# Patient Record
Sex: MALE | Race: WHITE | NOT HISPANIC OR LATINO | Employment: STUDENT | URBAN - METROPOLITAN AREA
[De-identification: names, ages, dates, MRNs, and addresses within clinical notes are randomized per-mention and may not be internally consistent; named-entity substitution may affect disease eponyms.]

---

## 2017-03-03 ENCOUNTER — OFFICE VISIT (OUTPATIENT)
Dept: INTERNAL MEDICINE | Facility: CLINIC | Age: 27
End: 2017-03-03
Payer: COMMERCIAL

## 2017-03-03 VITALS
SYSTOLIC BLOOD PRESSURE: 110 MMHG | BODY MASS INDEX: 25.4 KG/M2 | HEART RATE: 80 BPM | TEMPERATURE: 98 F | DIASTOLIC BLOOD PRESSURE: 80 MMHG | HEIGHT: 67 IN | WEIGHT: 161.81 LBS

## 2017-03-03 DIAGNOSIS — R59.0 ANTERIOR CERVICAL LYMPHADENOPATHY: ICD-10-CM

## 2017-03-03 DIAGNOSIS — J02.9 PHARYNGITIS, UNSPECIFIED ETIOLOGY: Primary | ICD-10-CM

## 2017-03-03 PROCEDURE — 99204 OFFICE O/P NEW MOD 45 MIN: CPT | Mod: S$GLB,,, | Performed by: INTERNAL MEDICINE

## 2017-03-03 PROCEDURE — 99999 PR PBB SHADOW E&M-EST. PATIENT-LVL III: CPT | Mod: PBBFAC,,, | Performed by: INTERNAL MEDICINE

## 2017-03-03 RX ORDER — AMOXICILLIN 875 MG/1
875 TABLET, FILM COATED ORAL 2 TIMES DAILY
Qty: 14 TABLET | Refills: 0 | Status: SHIPPED | OUTPATIENT
Start: 2017-03-03 | End: 2017-03-10

## 2017-03-03 NOTE — PROGRESS NOTES
Subjective:       Patient ID: Donal Serrano is a 27 y.o. male.    Chief Complaint: Sore Throat and Lymphadenopathy    HPI   Pt here for evaluation of 2 days of persistent sore throat associated with anterior neck tenderness and slight post nasal drip. No fevers/chills or body aches. Some improvement with NSAIDs.   Review of Systems   Constitutional: Negative for activity change, appetite change, chills, diaphoresis, fatigue, fever and unexpected weight change.   HENT: Positive for sore throat. Negative for postnasal drip, rhinorrhea, sinus pressure, sneezing, trouble swallowing and voice change.    Respiratory: Negative for cough, shortness of breath and wheezing.    Cardiovascular: Negative for chest pain, palpitations and leg swelling.   Gastrointestinal: Negative for abdominal pain, blood in stool, constipation, diarrhea, nausea and vomiting.   Genitourinary: Negative for dysuria.   Musculoskeletal: Negative for arthralgias and myalgias.   Skin: Negative for rash and wound.   Allergic/Immunologic: Negative for environmental allergies and food allergies.   Hematological: Negative for adenopathy. Does not bruise/bleed easily.       Objective:      Physical Exam   Constitutional: He is oriented to person, place, and time. He appears well-developed and well-nourished. No distress.   HENT:   Head: Normocephalic and atraumatic.   Right Ear: External ear normal.   Left Ear: External ear normal.   Nose: Nose normal.   Mouth/Throat: Posterior oropharyngeal edema and posterior oropharyngeal erythema present. No oropharyngeal exudate or tonsillar abscesses.   Eyes: Conjunctivae and EOM are normal. Pupils are equal, round, and reactive to light. Right eye exhibits no discharge. Left eye exhibits no discharge. No scleral icterus.   Neck: Normal range of motion. Neck supple. No JVD present.   Cardiovascular: Normal rate, regular rhythm and normal heart sounds.    No murmur heard.  Pulmonary/Chest: Effort normal and  breath sounds normal. No respiratory distress. He has no wheezes. He has no rales.   Musculoskeletal: He exhibits no edema.   Lymphadenopathy:     He has cervical adenopathy.   Neurological: He is alert and oriented to person, place, and time.   Skin: Skin is warm and dry. No rash noted. He is not diaphoretic. No pallor.       Assessment:       1. Pharyngitis, unspecified etiology    2. Anterior cervical lymphadenopathy        Plan:    1. Rx Amoxicillin BID x 7 days, continue NSAIDs PRN fever/pain       May use Cepacol lozenges PRN pain

## 2017-08-29 ENCOUNTER — OFFICE VISIT (OUTPATIENT)
Dept: DERMATOLOGY | Facility: CLINIC | Age: 27
End: 2017-08-29
Payer: COMMERCIAL

## 2017-08-29 DIAGNOSIS — D22.9 MULTIPLE BENIGN NEVI: ICD-10-CM

## 2017-08-29 DIAGNOSIS — Z87.898 HISTORY OF ATYPICAL NEVUS: ICD-10-CM

## 2017-08-29 DIAGNOSIS — D48.9 NEOPLASM OF UNCERTAIN BEHAVIOR: Primary | ICD-10-CM

## 2017-08-29 DIAGNOSIS — Z12.83 SCREENING EXAM FOR SKIN CANCER: ICD-10-CM

## 2017-08-29 PROCEDURE — 99203 OFFICE O/P NEW LOW 30 MIN: CPT | Mod: 25,S$GLB,, | Performed by: DERMATOLOGY

## 2017-08-29 PROCEDURE — 88305 TISSUE EXAM BY PATHOLOGIST: CPT | Performed by: PATHOLOGY

## 2017-08-29 PROCEDURE — 99999 PR PBB SHADOW E&M-EST. PATIENT-LVL III: CPT | Mod: PBBFAC,,, | Performed by: DERMATOLOGY

## 2017-08-29 PROCEDURE — 3008F BODY MASS INDEX DOCD: CPT | Mod: S$GLB,,, | Performed by: DERMATOLOGY

## 2017-08-29 PROCEDURE — 11100 PR BIOPSY OF SKIN LESION: CPT | Mod: S$GLB,,, | Performed by: DERMATOLOGY

## 2017-08-29 NOTE — PROGRESS NOTES
"  Subjective:       Patient ID:  Donal Serrano is a 27 y.o. male who presents for   Chief Complaint   Patient presents with    Skin Check     Last seen 8/22/13. H/o Atypical Nevi    Patient is here today for a "mole" check.   Pt has a history of  moderate sun exposure in the past.   Pt recalls several blistering sunburns in the past- no  Pt has history of tanning bed use- no  Pt has  had moles removed in the past- numerous   H/O multiple atypical nevi's removed  Pt has history of melanoma in first degree relatives-  Maternal grandmother and grandfather              Review of Systems   Constitutional: Negative for weight loss, fatigue, night sweats and malaise.   HENT: Negative for headaches.    Respiratory: Negative for cough and shortness of breath.    Gastrointestinal: Negative for indigestion.   Skin: Positive for activity-related sunscreen use. Negative for daily sunscreen use, recent sunburn and wears hat.   Neurological: Negative for headaches.   Hematologic/Lymphatic: Negative for adenopathy. Does not bruise/bleed easily.        Objective:    Physical Exam   Constitutional: He appears well-developed and well-nourished. No distress.   Neurological: He is alert and oriented to person, place, and time. He is not disoriented.   Psychiatric: He has a normal mood and affect.   Skin:   Areas Examined (abnormalities noted in diagram):   Scalp / Hair Palpated and Inspected  Head / Face Inspection Performed  Neck Inspection Performed  Chest / Axilla Inspection Performed  Abdomen Inspection Performed  Genitals / Buttocks / Groin Inspection Performed  Back Inspection Performed  RUE Inspected  LUE Inspection Performed  RLE Inspected  LLE Inspection Performed  Nails and Digits Inspection Performed                       Diagram Legend     Erythematous scaling macule/papule c/w actinic keratosis       Vascular papule c/w angioma      Pigmented verrucoid papule/plaque c/w seborrheic keratosis      Yellow umbilicated " papule c/w sebaceous hyperplasia      Irregularly shaped tan macule c/w lentigo     1-2 mm smooth white papules consistent with Milia      Movable subcutaneous cyst with punctum c/w epidermal inclusion cyst      Subcutaneous movable cyst c/w pilar cyst      Firm pink to brown papule c/w dermatofibroma      Pedunculated fleshy papule(s) c/w skin tag(s)      Evenly pigmented macule c/w junctional nevus     Mildly variegated pigmented, slightly irregular-bordered macule c/w mildly atypical nevus      Flesh colored to evenly pigmented papule c/w intradermal nevus       Pink pearly papule/plaque c/w basal cell carcinoma      Erythematous hyperkeratotic cursted plaque c/w SCC      Surgical scar with no sign of skin cancer recurrence      Open and closed comedones      Inflammatory papules and pustules      Verrucoid papule consistent consistent with wart     Erythematous eczematous patches and plaques     Dystrophic onycholytic nail with subungual debris c/w onychomycosis     Umbilicated papule    Erythematous-base heme-crusted tan verrucoid plaque consistent with inflamed seborrheic keratosis     Erythematous Silvery Scaling Plaque c/w Psoriasis     See annotation              Assessment / Plan:      Pathology Orders:      Normal Orders This Visit    Tissue Specimen To Pathology, Dermatology     Questions:    Directional Terms:  Other(comment)    Clinical information:  r/o atypical nevus vs other    Specific Site:  left flank        Neoplasm of uncertain behavior  -     Tissue Specimen To Pathology, Dermatology  Shave biopsy procedure note:    Shave biopsy performed after verbal consent including risk of infection, scar, recurrence, need for additional treatment of site. Area prepped with alcohol, anesthetized with approximately 1.0cc of 1% lidocaine with epinephrine. Lesional tissue shaved with razor blade. Hemostasis achieved with application of aluminum chloride followed by hyfrecation. No complications. Dressing  applied. Wound care explained.    Saucerization    Multiple benign nevi  Total body skin examination performed today including at least 12 points as noted in physical examination. No lesions suspicious for malignancy noted.  Reassurance provided.  Instructed patient to observe lesion(s) for changes and follow up in clinic if changes are noted. Discussed ABCDE's of moles and brochure provided.      H/o Atypical nevi  Patient with several mildly atypical nevi. Instructed patient to observe lesion(s) for changes and follow up in clinic if changes are noted. Discussed ABCDE's of moles.    Screening exam for skin cancer  Total body skin examination performed today including at least 12 points as noted in physical examination. Suspicious lesions noted.               Return in about 1 year (around 8/29/2018), or if symptoms worsen or fail to improve.

## 2017-08-29 NOTE — PATIENT INSTRUCTIONS
Shave Biopsy Wound Care    Your doctor has performed a shave biopsy today.  A band aid and vaseline ointment has been placed over the site.  This should remain in place for 24 hours.  It is recommended that you keep the area dry for the first 24 hours.  After 24 hours, you may remove the band aid and wash the area with warm soap and water and apply Vaseline jelly.  Many patients prefer to use Neosporin or Bacitracin ointment.  This is acceptable; however, know that you can develop an allergy to this medication even if you have used it safely for years.  It is important to keep the area moist.  Letting it dry out and get air slows healing time, and will worsen the scar.  Band aid is optional after first 24 hours.      If you notice increasing redness, tenderness, pain, or yellow drainage at the biopsy site, please notify your doctor.  These are signs of an infection.    If your biopsy site is bleeding, apply firm pressure for 15 minutes straight.  Repeat for another 15 minutes, if it is still bleeding.   If the surgical site continues to bleed, then please contact your doctor.      [facility] [place] [address]/ [phone] FAX/ [email or web address]

## 2018-01-23 ENCOUNTER — OFFICE VISIT (OUTPATIENT)
Dept: URGENT CARE | Facility: CLINIC | Age: 28
End: 2018-01-23
Payer: COMMERCIAL

## 2018-01-23 VITALS
RESPIRATION RATE: 18 BRPM | DIASTOLIC BLOOD PRESSURE: 76 MMHG | TEMPERATURE: 98 F | BODY MASS INDEX: 25.27 KG/M2 | HEART RATE: 104 BPM | HEIGHT: 67 IN | WEIGHT: 161 LBS | OXYGEN SATURATION: 96 % | SYSTOLIC BLOOD PRESSURE: 118 MMHG

## 2018-01-23 DIAGNOSIS — J11.1 INFLUENZA: ICD-10-CM

## 2018-01-23 DIAGNOSIS — R52 BODY ACHES: Primary | ICD-10-CM

## 2018-01-23 LAB
CTP QC/QA: YES
FLUAV AG NPH QL: POSITIVE
FLUBV AG NPH QL: NEGATIVE

## 2018-01-23 PROCEDURE — 99203 OFFICE O/P NEW LOW 30 MIN: CPT | Mod: S$GLB,,, | Performed by: FAMILY MEDICINE

## 2018-01-23 PROCEDURE — 87804 INFLUENZA ASSAY W/OPTIC: CPT | Mod: QW,S$GLB,, | Performed by: FAMILY MEDICINE

## 2018-01-23 RX ORDER — BENZONATATE 100 MG/1
100 CAPSULE ORAL EVERY 6 HOURS PRN
Qty: 30 CAPSULE | Refills: 1 | Status: SHIPPED | OUTPATIENT
Start: 2018-01-23 | End: 2018-02-26 | Stop reason: ALTCHOICE

## 2018-01-23 RX ORDER — OSELTAMIVIR PHOSPHATE 75 MG/1
75 CAPSULE ORAL 2 TIMES DAILY
Qty: 10 CAPSULE | Refills: 0 | Status: SHIPPED | OUTPATIENT
Start: 2018-01-23 | End: 2018-01-28

## 2018-01-23 NOTE — LETTER
January 23, 2018      [facility]  [address] [place] [address]  Phone: [phone]  Fax: [phone]       Patient: Donal Serrano   YOB: 1990  Date of Visit: 01/23/2018    To Whom It May Concern:    Miesha Serrano  was at [facility] on 01/23/2018. He may return to work/school on 01/25/2018 with no restrictions. If you have any questions or concerns, or if I can be of further assistance, please do not hesitate to contact me.    Sincerely,    Denis Navas MA

## 2018-01-23 NOTE — PROGRESS NOTES
"Subjective:       Patient ID: Donal Serrano is a 27 y.o. male.    Vitals:  height is 5' 7" (1.702 m) and weight is 73 kg (161 lb). His oral temperature is 98 °F (36.7 °C). His blood pressure is 162/102 (abnormal) and his pulse is 104. His respiration is 18 and oxygen saturation is 96%.     Chief Complaint: Generalized Body Aches and Cough    Patient states he went to the doctor the other day and he was told he has high blood pressure. Patient states the doctor did not start him on anything and told him to come back for another visit for the blood pressure issues. Patient states he is not feeling well. Patient states he has a cough, body aches, and sweating.      Cough   This is a new problem. The current episode started in the past 7 days. The problem has been unchanged. The problem occurs constantly. The cough is non-productive. Associated symptoms include chills, headaches, nasal congestion and postnasal drip. Pertinent negatives include no chest pain, ear pain, eye redness, fever, myalgias, sore throat, shortness of breath or wheezing. He has tried nothing for the symptoms.     Review of Systems   Constitution: Positive for chills, malaise/fatigue and night sweats. Negative for fever.   HENT: Positive for congestion and postnasal drip. Negative for ear pain, hoarse voice and sore throat.         Patient states his jaw hurts.   Eyes: Negative for discharge and redness.   Cardiovascular: Negative for chest pain, dyspnea on exertion and leg swelling.   Respiratory: Positive for cough. Negative for shortness of breath, sputum production and wheezing.    Musculoskeletal: Negative for myalgias.        All over body aches   Gastrointestinal: Negative for abdominal pain and nausea.   Neurological: Positive for headaches.       Objective:      Physical Exam   Constitutional: He is oriented to person, place, and time. He appears well-developed and well-nourished. He is cooperative.  Non-toxic appearance. He does " not appear ill. No distress.   HENT:   Head: Normocephalic and atraumatic.   Right Ear: Hearing, tympanic membrane, external ear and ear canal normal.   Left Ear: Hearing, tympanic membrane, external ear and ear canal normal.   Nose: Nose normal. No mucosal edema, rhinorrhea or nasal deformity. No epistaxis. Right sinus exhibits no maxillary sinus tenderness and no frontal sinus tenderness. Left sinus exhibits no maxillary sinus tenderness and no frontal sinus tenderness.   Mouth/Throat: Uvula is midline, oropharynx is clear and moist and mucous membranes are normal. No trismus in the jaw. Normal dentition. No uvula swelling. No posterior oropharyngeal erythema.   Eyes: Conjunctivae and lids are normal. Right eye exhibits no discharge. Left eye exhibits no discharge. No scleral icterus.   Sclera clear bilat   Neck: Trachea normal, normal range of motion, full passive range of motion without pain and phonation normal. Neck supple.   Cardiovascular: Normal rate, regular rhythm, normal heart sounds, intact distal pulses and normal pulses.    Pulmonary/Chest: Effort normal and breath sounds normal. He has no wheezes. He has no rales. He exhibits no tenderness.   Abdominal: Soft. Normal appearance and bowel sounds are normal. He exhibits no distension, no pulsatile midline mass and no mass. There is no tenderness.   Musculoskeletal: Normal range of motion. He exhibits no edema or deformity.   Neurological: He is alert and oriented to person, place, and time. He exhibits normal muscle tone. Coordination normal.   Skin: Skin is warm, dry and intact. He is not diaphoretic. No pallor.   Psychiatric: He has a normal mood and affect. His speech is normal and behavior is normal. Judgment and thought content normal. Cognition and memory are normal.   Nursing note and vitals reviewed.      Assessment:       1. Body aches    2. Influenza        Plan:         Body aches  -     POCT Influenza A/B    Influenza  -     oseltamivir  (TAMIFLU) 75 MG capsule; Take 1 capsule (75 mg total) by mouth 2 (two) times daily.  Dispense: 10 capsule; Refill: 0  -     benzonatate (TESSALON PERLES) 100 MG capsule; Take 1 capsule (100 mg total) by mouth every 6 (six) hours as needed for Cough.  Dispense: 30 capsule; Refill: 1        claritin one daily....Follow up with PMD for elevated Bp,   PT is aware of it

## 2018-01-25 ENCOUNTER — TELEPHONE (OUTPATIENT)
Dept: URGENT CARE | Facility: CLINIC | Age: 28
End: 2018-01-25

## 2018-02-26 ENCOUNTER — TELEPHONE (OUTPATIENT)
Dept: INFECTIOUS DISEASES | Facility: CLINIC | Age: 28
End: 2018-02-26

## 2018-02-26 ENCOUNTER — LAB VISIT (OUTPATIENT)
Dept: LAB | Facility: HOSPITAL | Age: 28
End: 2018-02-26
Attending: INTERNAL MEDICINE
Payer: COMMERCIAL

## 2018-02-26 ENCOUNTER — OFFICE VISIT (OUTPATIENT)
Dept: INTERNAL MEDICINE | Facility: CLINIC | Age: 28
End: 2018-02-26
Payer: COMMERCIAL

## 2018-02-26 VITALS
SYSTOLIC BLOOD PRESSURE: 138 MMHG | RESPIRATION RATE: 16 BRPM | HEIGHT: 67 IN | WEIGHT: 163.13 LBS | BODY MASS INDEX: 25.6 KG/M2 | HEART RATE: 76 BPM | DIASTOLIC BLOOD PRESSURE: 88 MMHG | TEMPERATURE: 99 F

## 2018-02-26 DIAGNOSIS — Z00.00 ANNUAL PHYSICAL EXAM: ICD-10-CM

## 2018-02-26 DIAGNOSIS — J30.9 ALLERGIC SINUSITIS: ICD-10-CM

## 2018-02-26 DIAGNOSIS — R05.9 COUGH: ICD-10-CM

## 2018-02-26 DIAGNOSIS — Z00.00 ANNUAL PHYSICAL EXAM: Primary | ICD-10-CM

## 2018-02-26 LAB
ALBUMIN SERPL BCP-MCNC: 4 G/DL
ALP SERPL-CCNC: 84 U/L
ALT SERPL W/O P-5'-P-CCNC: 23 U/L
ANION GAP SERPL CALC-SCNC: 8 MMOL/L
AST SERPL-CCNC: 22 U/L
BASOPHILS # BLD AUTO: 0.06 K/UL
BASOPHILS NFR BLD: 0.7 %
BILIRUB SERPL-MCNC: 0.3 MG/DL
BUN SERPL-MCNC: 11 MG/DL
CALCIUM SERPL-MCNC: 9.7 MG/DL
CHLORIDE SERPL-SCNC: 107 MMOL/L
CHOLEST SERPL-MCNC: 162 MG/DL
CHOLEST/HDLC SERPL: 3.4 {RATIO}
CO2 SERPL-SCNC: 27 MMOL/L
CREAT SERPL-MCNC: 1 MG/DL
DIFFERENTIAL METHOD: ABNORMAL
EOSINOPHIL # BLD AUTO: 0.3 K/UL
EOSINOPHIL NFR BLD: 3.1 %
ERYTHROCYTE [DISTWIDTH] IN BLOOD BY AUTOMATED COUNT: 12 %
EST. GFR  (AFRICAN AMERICAN): >60 ML/MIN/1.73 M^2
EST. GFR  (NON AFRICAN AMERICAN): >60 ML/MIN/1.73 M^2
GLUCOSE SERPL-MCNC: 91 MG/DL
HCT VFR BLD AUTO: 44.8 %
HDLC SERPL-MCNC: 48 MG/DL
HDLC SERPL: 29.6 %
HGB BLD-MCNC: 15.5 G/DL
IMM GRANULOCYTES # BLD AUTO: 0.18 K/UL
IMM GRANULOCYTES NFR BLD AUTO: 2.2 %
LDLC SERPL CALC-MCNC: 97.8 MG/DL
LYMPHOCYTES # BLD AUTO: 2.1 K/UL
LYMPHOCYTES NFR BLD: 26.2 %
MCH RBC QN AUTO: 29.7 PG
MCHC RBC AUTO-ENTMCNC: 34.6 G/DL
MCV RBC AUTO: 86 FL
MONOCYTES # BLD AUTO: 0.8 K/UL
MONOCYTES NFR BLD: 9.6 %
NEUTROPHILS # BLD AUTO: 4.7 K/UL
NEUTROPHILS NFR BLD: 58.2 %
NONHDLC SERPL-MCNC: 114 MG/DL
NRBC BLD-RTO: 0 /100 WBC
PLATELET # BLD AUTO: 219 K/UL
PMV BLD AUTO: 10 FL
POTASSIUM SERPL-SCNC: 3.9 MMOL/L
PROT SERPL-MCNC: 8 G/DL
RBC # BLD AUTO: 5.22 M/UL
SODIUM SERPL-SCNC: 142 MMOL/L
TRIGL SERPL-MCNC: 81 MG/DL
TSH SERPL DL<=0.005 MIU/L-ACNC: 1.23 UIU/ML
WBC # BLD AUTO: 8.04 K/UL

## 2018-02-26 PROCEDURE — 84443 ASSAY THYROID STIM HORMONE: CPT

## 2018-02-26 PROCEDURE — 80053 COMPREHEN METABOLIC PANEL: CPT

## 2018-02-26 PROCEDURE — 80061 LIPID PANEL: CPT

## 2018-02-26 PROCEDURE — 85025 COMPLETE CBC W/AUTO DIFF WBC: CPT

## 2018-02-26 PROCEDURE — 99999 PR PBB SHADOW E&M-EST. PATIENT-LVL III: CPT | Mod: PBBFAC,,, | Performed by: INTERNAL MEDICINE

## 2018-02-26 PROCEDURE — 99395 PREV VISIT EST AGE 18-39: CPT | Mod: S$GLB,,, | Performed by: INTERNAL MEDICINE

## 2018-02-26 PROCEDURE — 36415 COLL VENOUS BLD VENIPUNCTURE: CPT | Mod: PO

## 2018-02-26 RX ORDER — ATOVAQUONE AND PROGUANIL HYDROCHLORIDE 250; 100 MG/1; MG/1
1 TABLET, FILM COATED ORAL DAILY
Qty: 30 TABLET | Refills: 0 | Status: SHIPPED | OUTPATIENT
Start: 2018-02-26 | End: 2018-07-19

## 2018-02-26 RX ORDER — BENZONATATE 200 MG/1
200 CAPSULE ORAL 3 TIMES DAILY PRN
Qty: 60 CAPSULE | Refills: 3 | Status: SHIPPED | OUTPATIENT
Start: 2018-02-26 | End: 2018-03-08

## 2018-02-26 RX ORDER — AZITHROMYCIN 500 MG/1
500 TABLET, FILM COATED ORAL DAILY
Qty: 5 TABLET | Refills: 0 | Status: SHIPPED | OUTPATIENT
Start: 2018-02-26 | End: 2018-03-03

## 2018-03-05 ENCOUNTER — CLINICAL SUPPORT (OUTPATIENT)
Dept: INFECTIOUS DISEASES | Facility: CLINIC | Age: 28
End: 2018-03-05
Payer: COMMERCIAL

## 2018-03-05 PROCEDURE — 90632 HEPA VACCINE ADULT IM: CPT | Mod: S$GLB,,, | Performed by: INTERNAL MEDICINE

## 2018-03-05 PROCEDURE — 90471 IMMUNIZATION ADMIN: CPT | Mod: S$GLB,,, | Performed by: INTERNAL MEDICINE

## 2018-03-05 PROCEDURE — 99999 PR PBB SHADOW E&M-EST. PATIENT-LVL I: CPT | Mod: PBBFAC,,,

## 2018-07-02 ENCOUNTER — TELEPHONE (OUTPATIENT)
Dept: INFECTIOUS DISEASES | Facility: OTHER | Age: 28
End: 2018-07-02

## 2018-07-02 ENCOUNTER — HOSPITAL ENCOUNTER (EMERGENCY)
Facility: HOSPITAL | Age: 28
Discharge: HOME | End: 2018-07-02
Attending: EMERGENCY MEDICINE
Payer: COMMERCIAL

## 2018-07-02 VITALS
DIASTOLIC BLOOD PRESSURE: 98 MMHG | RESPIRATION RATE: 18 BRPM | HEART RATE: 92 BPM | WEIGHT: 160 LBS | OXYGEN SATURATION: 98 % | BODY MASS INDEX: 25.11 KG/M2 | SYSTOLIC BLOOD PRESSURE: 149 MMHG | TEMPERATURE: 99 F | HEIGHT: 67 IN

## 2018-07-02 DIAGNOSIS — R10.31 RIGHT LOWER QUADRANT ABDOMINAL PAIN: Primary | ICD-10-CM

## 2018-07-02 LAB
ALBUMIN SERPL BCP-MCNC: 4.3 G/DL
ALP SERPL-CCNC: 144 U/L
ALT SERPL W/O P-5'-P-CCNC: 270 U/L
ANION GAP SERPL CALC-SCNC: 14 MMOL/L
AST SERPL-CCNC: 126 U/L
BASOPHILS # BLD AUTO: 0.02 K/UL
BASOPHILS NFR BLD: 0.2 %
BILIRUB SERPL-MCNC: 0.7 MG/DL
BILIRUB UR QL STRIP: NEGATIVE
BUN SERPL-MCNC: 11 MG/DL
CALCIUM SERPL-MCNC: 10.6 MG/DL
CHLORIDE SERPL-SCNC: 108 MMOL/L
CLARITY UR REFRACT.AUTO: ABNORMAL
CO2 SERPL-SCNC: 20 MMOL/L
COLOR UR AUTO: YELLOW
CREAT SERPL-MCNC: 1 MG/DL
DIFFERENTIAL METHOD: ABNORMAL
EOSINOPHIL # BLD AUTO: 0.2 K/UL
EOSINOPHIL NFR BLD: 2.5 %
ERYTHROCYTE [DISTWIDTH] IN BLOOD BY AUTOMATED COUNT: 11.9 %
EST. GFR  (AFRICAN AMERICAN): >60 ML/MIN/1.73 M^2
EST. GFR  (NON AFRICAN AMERICAN): >60 ML/MIN/1.73 M^2
GLUCOSE SERPL-MCNC: 92 MG/DL
GLUCOSE UR QL STRIP: NEGATIVE
HCT VFR BLD AUTO: 49.1 %
HGB BLD-MCNC: 17.2 G/DL
HGB UR QL STRIP: NEGATIVE
IMM GRANULOCYTES # BLD AUTO: 0.37 K/UL
IMM GRANULOCYTES NFR BLD AUTO: 4.4 %
KETONES UR QL STRIP: NEGATIVE
LEUKOCYTE ESTERASE UR QL STRIP: NEGATIVE
LYMPHOCYTES # BLD AUTO: 2.5 K/UL
LYMPHOCYTES NFR BLD: 29.7 %
MCH RBC QN AUTO: 29.9 PG
MCHC RBC AUTO-ENTMCNC: 35 G/DL
MCV RBC AUTO: 85 FL
MONOCYTES # BLD AUTO: 0.9 K/UL
MONOCYTES NFR BLD: 10.8 %
NEUTROPHILS # BLD AUTO: 4.4 K/UL
NEUTROPHILS NFR BLD: 52.4 %
NITRITE UR QL STRIP: NEGATIVE
NRBC BLD-RTO: 0 /100 WBC
PH UR STRIP: 7 [PH] (ref 5–8)
PLATELET # BLD AUTO: 189 K/UL
PMV BLD AUTO: 9.6 FL
POTASSIUM SERPL-SCNC: 3.7 MMOL/L
PROT SERPL-MCNC: 8.5 G/DL
PROT UR QL STRIP: NEGATIVE
RBC # BLD AUTO: 5.76 M/UL
SODIUM SERPL-SCNC: 142 MMOL/L
SP GR UR STRIP: 1.01 (ref 1–1.03)
URN SPEC COLLECT METH UR: ABNORMAL
UROBILINOGEN UR STRIP-ACNC: NEGATIVE EU/DL
WBC # BLD AUTO: 8.37 K/UL

## 2018-07-02 PROCEDURE — 85025 COMPLETE CBC W/AUTO DIFF WBC: CPT

## 2018-07-02 PROCEDURE — 25500020 PHARM REV CODE 255: Performed by: EMERGENCY MEDICINE

## 2018-07-02 PROCEDURE — 80053 COMPREHEN METABOLIC PANEL: CPT

## 2018-07-02 PROCEDURE — 99284 EMERGENCY DEPT VISIT MOD MDM: CPT | Mod: 25

## 2018-07-02 PROCEDURE — 25000003 PHARM REV CODE 250: Performed by: EMERGENCY MEDICINE

## 2018-07-02 PROCEDURE — 80074 ACUTE HEPATITIS PANEL: CPT

## 2018-07-02 PROCEDURE — 99284 EMERGENCY DEPT VISIT MOD MDM: CPT | Mod: ,,, | Performed by: EMERGENCY MEDICINE

## 2018-07-02 PROCEDURE — 81003 URINALYSIS AUTO W/O SCOPE: CPT

## 2018-07-02 PROCEDURE — 96360 HYDRATION IV INFUSION INIT: CPT

## 2018-07-02 RX ORDER — METRONIDAZOLE 500 MG/1
500 TABLET ORAL 3 TIMES DAILY
Qty: 30 TABLET | Refills: 0 | Status: SHIPPED | OUTPATIENT
Start: 2018-07-02 | End: 2018-07-02

## 2018-07-02 RX ORDER — CIPROFLOXACIN 500 MG/1
500 TABLET ORAL 2 TIMES DAILY
Qty: 20 TABLET | Refills: 0 | Status: SHIPPED | OUTPATIENT
Start: 2018-07-02 | End: 2018-07-02

## 2018-07-02 RX ORDER — CIPROFLOXACIN 500 MG/1
500 TABLET ORAL 2 TIMES DAILY
Qty: 20 TABLET | Refills: 0 | Status: SHIPPED | OUTPATIENT
Start: 2018-07-02 | End: 2018-07-05 | Stop reason: SDUPTHER

## 2018-07-02 RX ORDER — METRONIDAZOLE 500 MG/1
500 TABLET ORAL 3 TIMES DAILY
Qty: 30 TABLET | Refills: 0 | Status: SHIPPED | OUTPATIENT
Start: 2018-07-02 | End: 2018-07-05 | Stop reason: SDUPTHER

## 2018-07-02 RX ADMIN — SODIUM CHLORIDE 1000 ML: 0.9 INJECTION, SOLUTION INTRAVENOUS at 06:07

## 2018-07-02 RX ADMIN — IOHEXOL 75 ML: 350 INJECTION, SOLUTION INTRAVENOUS at 07:07

## 2018-07-02 NOTE — ED PROVIDER NOTES
Encounter Date: 7/2/2018    SCRIBE #1 NOTE: I, Amira Roberts, am scribing for, and in the presence of,  Dr. Lopes. I have scribed the following portions of the note - Other sections scribed: HPI ROS PE.       History     Chief Complaint   Patient presents with    Vomiting     Pt presented to the ED via pov. Pt states he just flew in from [place]. Pt c/o RLQ abdominal pain, n/v/d x 4 days.     Abdominal Pain    Diarrhea     Time patient was seen by the provider: 6:16 PM      The patient is a 28 y.o. male with co-morbidities including  who presents to the ED with abdominal pain, nausea, vomiting, and diarrhea. Patient just flew back from [facility] today. He arrived to  [place] 7 days ago, and then started feeling super bloated with abdominal pain that felt like his stomach was on fire. It migrated to his RLQ and then he started having diarrhea. Patient has had kidney stones before. Currently being treated with anti-malaria meds, antibiotics, and antacids.       The history is provided by the patient and medical records.     Review of patient's allergies indicates:  No Known Allergies  Past Medical History:   Diagnosis Date    Hypertension     Keloid cicatrix     Kidney stones      Past Surgical History:   Procedure Laterality Date    CHOLECYSTECTOMY       Family History   Problem Relation Age of Onset    Skin cancer Maternal Grandfather     Melanoma Maternal Grandfather     Hypertension Mother     Cancer Father         Liver    Skin cancer Paternal Grandfather     Heart disease Maternal Uncle     Stroke Neg Hx      Social History   Substance Use Topics    Smoking status: Never Smoker    Smokeless tobacco: Never Used    Alcohol use No     Review of Systems   Constitutional: Positive for chills. Negative for fever.   HENT: Negative for sore throat.    Respiratory: Negative for shortness of breath.    Cardiovascular: Negative for chest pain.   Gastrointestinal: Positive for abdominal pain,  diarrhea, nausea and vomiting.   Genitourinary: Negative for dysuria.   Musculoskeletal: Negative for back pain.   Skin: Negative for rash.   Neurological: Negative for weakness.   Psychiatric/Behavioral: Negative for confusion.       Physical Exam     Initial Vitals [07/02/18 1807]   BP Pulse Resp Temp SpO2   (!) 182/114 78 17 98.6 °F (37 °C) 97 %      MAP       --         Physical Exam    Vitals reviewed.  Constitutional: He appears well-developed and well-nourished.   HENT:   Head: Normocephalic and atraumatic.   Mouth/Throat: Oropharynx is clear and moist.   Eyes: Conjunctivae are normal. Pupils are equal, round, and reactive to light.   Neck: Normal range of motion. Neck supple.   Cardiovascular: Normal rate, regular rhythm, normal heart sounds and intact distal pulses.   Pulmonary/Chest: Breath sounds normal. No respiratory distress.   Abdominal: Soft. He exhibits no distension. There is tenderness (RUQ tenderness).   Musculoskeletal:   No CVA tenderness   Neurological: He is alert and oriented to person, place, and time.   Skin: Skin is warm and dry.   Psychiatric: He has a normal mood and affect. Thought content normal.         ED Course   Procedures  Labs Reviewed   CBC W/ AUTO DIFFERENTIAL - Abnormal; Notable for the following:        Result Value    Immature Granulocytes 4.4 (*)     Immature Grans (Abs) 0.37 (*)     All other components within normal limits   COMPREHENSIVE METABOLIC PANEL - Abnormal; Notable for the following:     CO2 20 (*)     Calcium 10.6 (*)     Total Protein 8.5 (*)     Alkaline Phosphatase 144 (*)      (*)      (*)     All other components within normal limits   URINALYSIS, REFLEX TO URINE CULTURE - Abnormal; Notable for the following:     Appearance, UA Hazy (*)     All other components within normal limits    Narrative:     Preferred Collection Type->Urine, Clean Catch  CUP   HEPATITIS PANEL, ACUTE   HEPATITIS PANEL, ACUTE          Imaging Results          CT Abdomen  Pelvis With Contrast (Final result)  Result time 07/02/18 19:42:58    Final result by Van Lim MD (07/02/18 19:42:58)                 Impression:      1. Mild prominence of the appendix, without significant surrounding inflammation, findings may reflect sequela of early appendicitis in the appropriate clinical setting.  No focal organized fluid collection or free air noting scattered pericecal lymph nodes, may be reactive.  Correlation needed.  2. Status post cholecystectomy, and several additional findings above.      Electronically signed by: Van Lim MD  Date:    07/02/2018  Time:    19:42             Narrative:    EXAMINATION:  CT ABDOMEN PELVIS WITH CONTRAST    CLINICAL HISTORY:  RLQ pain, appendicitis suspected;    TECHNIQUE:  Low dose axial images, sagittal and coronal reformations were obtained from the lung bases to the pubic symphysis following the IV administration of 75 mL of Omnipaque 350 .  Oral contrast was not given.    COMPARISON:  CT 03/20/2012    FINDINGS:  Images of the lower thorax are unremarkable.    The liver, spleen, pancreas, and adrenal glands are grossly unremarkable.  The gallbladder is surgically absent.  No significant biliary dilation.  No ascites.  The pancreatic duct is not dilated    The kidneys enhance symmetrically without hydronephrosis or nephrolithiasis.  The bilateral ureters are unremarkable without calculi seen.  The urinary bladder is unremarkable.  The prostate is not enlarged.    The large bowel is grossly unremarkable.  The appendix is mildly prominent without significant surrounding inflammation.  Correlation with any right lower quadrant pain recommended, this could reflect early changes of acute appendicitis.  The small bowel is grossly unremarkable.  No focal organized fluid collection.  No free air.  There are a few nonenlarged although numerous pericecal lymph nodes.    No focal osseous destructive process.  No significant inguinal  lymphadenopathy.                                 Medical Decision Making:   History:   Old Medical Records: I decided to obtain old medical records.  Clinical Tests:   Lab Tests: Ordered and Reviewed  Radiological Study: Ordered and Reviewed  ED Management:  Possible early or partially treated appendicitis. Will consult surgery. Discused case with Dr. Prado.     Surgery does not feel appy is inflamed. Recc hep panel. Will continue abx, however only has 3 days left, so will convert to cipro and flagyl. Discussed again with Dr. Arriaga and will f/u in her clinic in near future. Requested surgery f/u pt as well.             Scribe Attestation:   Scribe #1: I performed the above scribed service and the documentation accurately describes the services I performed. I attest to the accuracy of the note.               Clinical Impression:   The encounter diagnosis was Right lower quadrant abdominal pain.                             Arley Lopes MD  07/02/18 2006       Arley Lopes MD  07/02/18 5735

## 2018-07-02 NOTE — ED TRIAGE NOTES
In [place] for a week- 3-4 days ago  Began w/ bloating and upper abd pain w/ burning . After 24 hrs pain radiated to lower rt abdomen.  Had diarrhea.  No blood.  Threw up several times - all resolved now.  Now having pain in lower abdomen - off and on . Denies  Fever or dysuria. Taking meds from [place]= loperium, Tinidazole+Norfloxacin and magnomint

## 2018-07-02 NOTE — TELEPHONE ENCOUNTER
Patient's mother contacted me from [facility] Thursday evening. Patient with diffuse abdominal pain which then was more prominent and the right side.  Has had prior cholecystectomy.  Patient also with abdominal bloating some nausea/ 3 BM over 24 hours. Not terribly loose. No fever or chills. Took peptobismol, prilosec, tums with no relief.   Seen next am by local doctor- temp was normal and /80. Pain reported diffuse on exam in lower right. The MD gave him 2 cimetidine, magnomint, tinidazole and norfloxacin to take if diarrhea developed.  Initially patient felt a little better but then developed worsening diarrhea and pain. Able to drink fluids and eat bland foods. Started antibiotics. MD saw him again the following morning. Pain more localized to the right side, also with a HA. Took tylenol. Was given electrolyte packets. Temp again was checked and normal /90. They decided to then [place] back [place] and [place] 7/2 late afternoon.  Advised them to come to ED upon arrival.  Will need stool studies-for culture, occult blood, O&P, cryptosporidium.  Blood cultures, Ct Abdomin/pelvis.  Discussed briefly with Dr. Vasquez ID staff on service.

## 2018-07-02 NOTE — ED NOTES
LOC: The patient is awake and alert; oriented x 3 and speaking appropriately.  APPEARANCE: Patient resting comfortably, patient is clean and well groomed  SKIN: warm and dry, normal skin turgor & moist mucus membranes, skin intact, no breakdown noted.  MUSCULOSKELETAL: Patient moving all extremities well, no obvious swelling or deformities noted  RESPIRATORY: Airway is open and patent,; respirations are spontaneous, normal effort and rate  CARDIAC: Patient has a normal rate, no peripheral edema noted, capillary refill < 3 seconds; No complaints of chest pain   ABDOMEN: Soft and non tender to palpation, no distention noted. Pain in rt side intermittent pressure type.Bowel sounds present x 4

## 2018-07-03 LAB
HAV IGM SERPL QL IA: NEGATIVE
HBV CORE IGM SERPL QL IA: NEGATIVE
HBV SURFACE AG SERPL QL IA: NEGATIVE
HCV AB SERPL QL IA: NEGATIVE

## 2018-07-03 NOTE — CONSULTS
[facility]  General Surgery  Consult Note    Inpatient consult to General Surgery  Consult performed by: SLOAN DIEHL  Consult ordered by: MERRY RIVERA  Reason for consult: RLQ pain  Assessment/Recommendations: Low index of suspicion for appendicitis based on clinical findings, history, lack of white count or left shift, lack of CT findings despite multiple days of RLQ pain.  Counseled patient on signs/sx and gave him general surgery dept number in case of worsening sx.   Recommend close PCP follow up to ensure liver enzymes improve.   ER is speaking with infectious disease about appropriate abx to dc on.         Subjective:     Chief Complaint/Reason for Admission: RLQ pain    History of Present Illness: Patient is returning from an  [facility]. He developed generalized abdominal pain that started 5days ago, settled in the RLQ 4 days ago. He developed nausea/vomiting, diarrhea. He was given tinidazole/norfloxacin. Everything but the pain has improved. Movement does not make it worse, he is ambulating fine. His appetite is fine, he ate lunch today. No fever/chills, no jaundice. His father  of hepatitis induced liver cirrhosis.     No current facility-administered medications on file prior to encounter.      Current Outpatient Prescriptions on File Prior to Encounter   Medication Sig    atovaquone-proguanil (MALARONE) 250-100 mg Tab Take 1 tablet by mouth once daily. Start 1 day prior, during and 7 days after travel.       Review of patient's allergies indicates:  No Known Allergies    Past Medical History:   Diagnosis Date    Hypertension     Keloid cicatrix     Kidney stones      Past Surgical History:   Procedure Laterality Date    CHOLECYSTECTOMY       Family History     Problem Relation (Age of Onset)    Cancer Father    Heart disease Maternal Uncle    Hypertension Mother    Melanoma Maternal Grandfather    Skin cancer Maternal Grandfather, Paternal Grandfather         Social History Main Topics    Smoking status: Never Smoker    Smokeless tobacco: Never Used    Alcohol use No    Drug use: No    Sexual activity: Not Currently     Review of Systems   Constitutional: Negative for activity change, appetite change, chills and fever.   HENT: Negative for congestion and trouble swallowing.    Eyes: Negative for visual disturbance.   Respiratory: Negative for cough and shortness of breath.    Cardiovascular: Negative for chest pain and leg swelling.   Gastrointestinal: Positive for abdominal pain. Negative for abdominal distention, constipation, diarrhea, nausea and vomiting.   Endocrine: Negative for cold intolerance and heat intolerance.   Genitourinary: Negative for difficulty urinating and dysuria.   Musculoskeletal: Negative for arthralgias and back pain.   Skin: Negative for rash and wound.   Neurological: Negative for dizziness and headaches.   Psychiatric/Behavioral: Negative for agitation and behavioral problems.     Objective:     Vital Signs (Most Recent):  Temp: 98.6 °F (37 °C) (07/02/18 1807)  Pulse: 78 (07/02/18 1807)  Resp: 17 (07/02/18 1807)  BP: (!) 182/114 (07/02/18 1807)  SpO2: 97 % (07/02/18 1807) Vital Signs (24h Range):  Temp:  [98.6 °F (37 °C)] 98.6 °F (37 °C)  Pulse:  [78] 78  Resp:  [17] 17  SpO2:  [97 %] 97 %  BP: (182)/(114) 182/114     Weight: 72.6 kg (160 lb)  Body mass index is 25.06 kg/m².    No intake or output data in the 24 hours ending 07/02/18 2056    Physical Exam   Constitutional: He is oriented to person, place, and time. He appears well-developed and well-nourished. No distress.   Cardiovascular: Normal rate and regular rhythm.  Exam reveals no gallop and no friction rub.    No murmur heard.  Pulmonary/Chest: Effort normal and breath sounds normal. No respiratory distress. He has no wheezes. He has no rales.   Abdominal: Soft. Bowel sounds are normal.   Focal tenderness in RLQ. No peritoneal signs. No Rovsings.    Musculoskeletal: Normal  range of motion. He exhibits no edema.   Neurological: He is alert and oriented to person, place, and time.   Skin: Skin is warm and dry.   Psychiatric: He has a normal mood and affect. His behavior is normal.       Significant Labs:  CBC:   Recent Labs  Lab 07/02/18  1834   WBC 8.37   RBC 5.76   HGB 17.2   HCT 49.1      MCV 85   MCH 29.9   MCHC 35.0     CMP:   Recent Labs  Lab 07/02/18  1834   GLU 92   CALCIUM 10.6*   ALBUMIN 4.3   PROT 8.5*      K 3.7   CO2 20*      BUN 11   CREATININE 1.0   ALKPHOS 144*   *   *   BILITOT 0.7       Significant Diagnostics:  CT: I have reviewed all pertinent results/findings within the past 24 hours. .    Assessment/Plan:     There are no hospital problems to display for this patient.      Thank you for your consult. I will sign off. Please contact us if you have any additional questions.    Brittany Hayes MD  General Surgery  [facility]

## 2018-07-05 ENCOUNTER — PATIENT MESSAGE (OUTPATIENT)
Dept: INFECTIOUS DISEASES | Facility: CLINIC | Age: 28
End: 2018-07-05

## 2018-07-05 ENCOUNTER — OFFICE VISIT (OUTPATIENT)
Dept: INFECTIOUS DISEASES | Facility: CLINIC | Age: 28
End: 2018-07-05
Payer: COMMERCIAL

## 2018-07-05 ENCOUNTER — OFFICE VISIT (OUTPATIENT)
Dept: SURGERY | Facility: CLINIC | Age: 28
End: 2018-07-05
Payer: COMMERCIAL

## 2018-07-05 VITALS
WEIGHT: 160 LBS | DIASTOLIC BLOOD PRESSURE: 101 MMHG | BODY MASS INDEX: 25.11 KG/M2 | HEART RATE: 74 BPM | HEIGHT: 67 IN | SYSTOLIC BLOOD PRESSURE: 151 MMHG

## 2018-07-05 VITALS
HEIGHT: 67 IN | TEMPERATURE: 98 F | HEART RATE: 71 BPM | DIASTOLIC BLOOD PRESSURE: 105 MMHG | WEIGHT: 161.63 LBS | BODY MASS INDEX: 25.37 KG/M2 | SYSTOLIC BLOOD PRESSURE: 148 MMHG

## 2018-07-05 DIAGNOSIS — K35.30 ACUTE APPENDICITIS WITH LOCALIZED PERITONITIS: ICD-10-CM

## 2018-07-05 DIAGNOSIS — I10 HYPERTENSION, UNSPECIFIED TYPE: ICD-10-CM

## 2018-07-05 DIAGNOSIS — R74.8 ELEVATED LIVER ENZYMES: ICD-10-CM

## 2018-07-05 DIAGNOSIS — R10.9 ABDOMINAL PAIN, UNSPECIFIED ABDOMINAL LOCATION: Primary | ICD-10-CM

## 2018-07-05 PROBLEM — K35.80 ACUTE APPENDICITIS: Status: ACTIVE | Noted: 2018-07-05

## 2018-07-05 PROCEDURE — 99999 PR PBB SHADOW E&M-EST. PATIENT-LVL III: CPT | Mod: PBBFAC,,, | Performed by: SURGERY

## 2018-07-05 PROCEDURE — 99203 OFFICE O/P NEW LOW 30 MIN: CPT | Mod: S$GLB,,, | Performed by: SURGERY

## 2018-07-05 PROCEDURE — 3008F BODY MASS INDEX DOCD: CPT | Mod: CPTII,S$GLB,, | Performed by: SURGERY

## 2018-07-05 PROCEDURE — 99215 OFFICE O/P EST HI 40 MIN: CPT | Mod: S$GLB,,, | Performed by: INTERNAL MEDICINE

## 2018-07-05 PROCEDURE — 3008F BODY MASS INDEX DOCD: CPT | Mod: CPTII,S$GLB,, | Performed by: INTERNAL MEDICINE

## 2018-07-05 PROCEDURE — 99999 PR PBB SHADOW E&M-EST. PATIENT-LVL III: CPT | Mod: PBBFAC,,, | Performed by: INTERNAL MEDICINE

## 2018-07-05 RX ORDER — METRONIDAZOLE 500 MG/1
500 TABLET ORAL 3 TIMES DAILY
Qty: 30 TABLET | Refills: 0 | Status: SHIPPED | OUTPATIENT
Start: 2018-07-05 | End: 2018-07-12 | Stop reason: SDUPTHER

## 2018-07-05 RX ORDER — CIPROFLOXACIN 500 MG/1
500 TABLET ORAL 2 TIMES DAILY
Qty: 20 TABLET | Refills: 0 | Status: SHIPPED | OUTPATIENT
Start: 2018-07-05 | End: 2018-07-12 | Stop reason: SDUPTHER

## 2018-07-05 NOTE — LETTER
July 5, 2018        David Kim, DO  [address] [facility] [place] [address]             [facility] - General Surgery  [address]  [place] [address]  Phone: [phone]   Patient: Donal Serrano   MR Number: [id number]   YOB: 1990   Date of Visit: 7/5/2018       Dear Dr. Kim:    Thank you for referring Donal Serrano to me for evaluation. Attached you will find relevant portions of my assessment and plan of care.    ASSESSMENT/PLAN:     Acute appendicitis.  Currently being treated well with antibiotics.    PLAN:    Continue antibiotics.  Rtc one week.    If you have questions, please do not hesitate to call me. I look forward to following Donal Serrano along with you.    Sincerely,      Saulo Powell MD            CC  No Recipients    Enclosure

## 2018-07-05 NOTE — PROGRESS NOTES
Subjective:      Patient ID: Donal Serrano is a 28 y.o. male.    Chief Complaint:Post Travel      History of Present Illness    Here today for follow up after recent travel. Went to [place]. After 2 days there while headed to [facility] in [facility], developed abdominal pain and nausea. Progressed to diarrhea. No fever recorded. No blood in stool. Pain then became more localized to right side of abdomin. Started tinidazole and norfloxacin. Took imodium while in [place] prior to travelling home. Has not taken it since. No longer has diarrhea.  Feels weak. Appetite improving. Lost about 5 pounds. Now on cipro and flagyl. Also remains on Malarone.  One other traveller developed diarrhea.  Has been following BP with primary MD. Strong family h/o HTN maternal side of family.    Review of Systems   Constitution: Positive for chills, decreased appetite, weakness, malaise/fatigue, night sweats and weight loss. Negative for fever and weight gain.   HENT: Positive for sore throat. Negative for congestion, ear pain, hearing loss, hoarse voice and tinnitus.    Eyes: Negative for blurred vision, redness and visual disturbance.   Cardiovascular: Negative for chest pain, leg swelling and palpitations.   Respiratory: Negative for cough, hemoptysis, shortness of breath and sputum production.    Hematologic/Lymphatic: Negative for adenopathy. Does not bruise/bleed easily.   Skin: Negative for dry skin, itching, rash and suspicious lesions.   Musculoskeletal: Negative for back pain, joint pain, myalgias and neck pain.   Gastrointestinal: Positive for abdominal pain, diarrhea, heartburn, nausea and vomiting. Negative for constipation.   Genitourinary: Positive for flank pain. Negative for dysuria, frequency, hematuria, hesitancy and urgency.   Neurological: Negative for dizziness, headaches, numbness and paresthesias.   Psychiatric/Behavioral: Negative for depression and memory loss. The patient has insomnia. The patient is not  nervous/anxious.      Objective:   Physical Exam   Constitutional: He is oriented to person, place, and time. He appears well-developed and well-nourished. No distress.   HENT:   Head: Normocephalic and atraumatic.   Right Ear: External ear normal.   Left Ear: External ear normal.   Nose: Nose normal.   Mouth/Throat: Oropharynx is clear and moist. No oropharyngeal exudate.   Eyes: Conjunctivae and EOM are normal. Pupils are equal, round, and reactive to light. Right eye exhibits no discharge. Left eye exhibits no discharge.   Neck: Normal range of motion. Neck supple. No JVD present. No tracheal deviation present. No thyromegaly present.   Cardiovascular: Normal rate, regular rhythm and normal heart sounds.  Exam reveals no gallop and no friction rub.    No murmur heard.  Pulmonary/Chest: Effort normal and breath sounds normal. No stridor. No respiratory distress. He has no wheezes. He has no rales. He exhibits no tenderness.   Abdominal: Soft. Bowel sounds are normal. He exhibits no distension and no mass. There is tenderness. There is guarding. There is no rebound.   Right lateral abdomin with tenderness.   Musculoskeletal: Normal range of motion. He exhibits no edema or tenderness.   Neurological: He is alert and oriented to person, place, and time. No cranial nerve deficit. Coordination normal.   Skin: Skin is warm and dry. No rash noted. He is not diaphoretic. No erythema. No pallor.   Psychiatric: He has a normal mood and affect. His behavior is normal. Judgment and thought content normal.   Nursing note and vitals reviewed.    Assessment:       1. Abdominal pain, unspecified abdominal location    2. Acute appendicitis with localized peritonitis    3. Elevated liver enzymes    4. Hypertension, unspecified type          Plan:       Complete cipro and flagyl as planned. He is following with Dr. Powell again in 1 week. Will have appendix removed hopefully in 6 months or so.  He is planning on traveling to  [place] in 1 month for mission trip. Will refill antibiotics for him to have on hand and counseled that he needs to have plan for quick return in case he develops symptoms.  Discussed foods to avoid.  He will f/u BP with PMD.  Labs today since LFTs were elevated. His Acute hepatitis panel was negative and he has had vaccines for hep A and B.  Will F/U if any worsening.

## 2018-07-05 NOTE — PROGRESS NOTES
History & Physical    SUBJECTIVE:     History of Present Illness:  Patient is a 28 y.o. male presents with acute appendicitis.  About 6 days ago (while in [place]) he had upper abdominal burning and after a few hours moved to the right lower quadrant.  The second day of pain he started antibiotics.  When he got here his antibiotics were changed and since then the pain has improved.  Now he has 2-3/10 pain.  The pain is not worsened by movement.  He was anorexic for 4-5 days but is eating fine now.  He is having normal bowel activity now but had diarrhea due to this illness.  He denies fevers.        Chief Complaint   Patient presents with    Consult     appendecitis       Review of patient's allergies indicates:  No Known Allergies    Current Outpatient Prescriptions   Medication Sig Dispense Refill    atovaquone-proguanil (MALARONE) 250-100 mg Tab Take 1 tablet by mouth once daily. Start 1 day prior, during and 7 days after travel. 30 tablet 0    ciprofloxacin HCl (CIPRO) 500 MG tablet Take 1 tablet (500 mg total) by mouth 2 (two) times daily. for 10 days 20 tablet 0    metroNIDAZOLE (FLAGYL) 500 MG tablet Take 1 tablet (500 mg total) by mouth 3 (three) times daily. for 10 days 30 tablet 0     No current facility-administered medications for this visit.        Past Medical History:   Diagnosis Date    Hypertension     Keloid cicatrix     Kidney stones      Past Surgical History:   Procedure Laterality Date    CHOLECYSTECTOMY       Family History   Problem Relation Age of Onset    Skin cancer Maternal Grandfather     Melanoma Maternal Grandfather     Hypertension Mother     Cancer Father         Liver    Skin cancer Paternal Grandfather     Heart disease Maternal Uncle     Stroke Neg Hx      Social History   Substance Use Topics    Smoking status: Never Smoker    Smokeless tobacco: Never Used    Alcohol use No        Review of Systems:  Review of Systems   Respiratory: Negative for cough, chest  tightness and shortness of breath.    Cardiovascular: Negative for chest pain.   Genitourinary: Negative for difficulty urinating and dysuria.   Skin: Negative for rash and wound.   Neurological: Positive for headaches. Negative for seizures.   Hematological: Does not bruise/bleed easily.       OBJECTIVE:     Vital Signs (Most Recent)              Physical Exam:  Physical Exam   Constitutional: He is oriented to person, place, and time. He appears well-developed and well-nourished.   Neck: Normal range of motion. Neck supple.   Cardiovascular: Normal rate, regular rhythm and normal heart sounds.    Pulmonary/Chest: Effort normal and breath sounds normal.   Abdominal: Soft. Normal appearance and bowel sounds are normal. There is tenderness in the right upper quadrant. There is no rigidity and no guarding.       Neurological: He is alert and oriented to person, place, and time.   Skin: Skin is warm and dry.   Psychiatric: He has a normal mood and affect. His behavior is normal. Judgment and thought content normal.   Vitals reviewed.      Laboratory  CBC: Reviewed  CMP: Reviewed    Diagnostic Results:  CT: Reviewed    ASSESSMENT/PLAN:     Acute appendicitis.  Currently being treated well with antibiotics.    PLAN:    Continue antibiotics.  Rtc one week.

## 2018-07-12 ENCOUNTER — PATIENT MESSAGE (OUTPATIENT)
Dept: INFECTIOUS DISEASES | Facility: CLINIC | Age: 28
End: 2018-07-12

## 2018-07-12 ENCOUNTER — LAB VISIT (OUTPATIENT)
Dept: LAB | Facility: HOSPITAL | Age: 28
End: 2018-07-12
Attending: INTERNAL MEDICINE
Payer: COMMERCIAL

## 2018-07-12 ENCOUNTER — OFFICE VISIT (OUTPATIENT)
Dept: SURGERY | Facility: CLINIC | Age: 28
End: 2018-07-12
Payer: COMMERCIAL

## 2018-07-12 VITALS
BODY MASS INDEX: 25.11 KG/M2 | HEIGHT: 67 IN | HEART RATE: 66 BPM | WEIGHT: 160 LBS | DIASTOLIC BLOOD PRESSURE: 99 MMHG | TEMPERATURE: 98 F | SYSTOLIC BLOOD PRESSURE: 140 MMHG

## 2018-07-12 DIAGNOSIS — R10.9 ABDOMINAL PAIN, UNSPECIFIED ABDOMINAL LOCATION: ICD-10-CM

## 2018-07-12 DIAGNOSIS — K35.30 ACUTE APPENDICITIS WITH LOCALIZED PERITONITIS: Primary | ICD-10-CM

## 2018-07-12 LAB
ALBUMIN SERPL BCP-MCNC: 4.2 G/DL
ALP SERPL-CCNC: 79 U/L
ALT SERPL W/O P-5'-P-CCNC: 77 U/L
ANION GAP SERPL CALC-SCNC: 7 MMOL/L
AST SERPL-CCNC: 39 U/L
BASOPHILS # BLD AUTO: 0.06 K/UL
BASOPHILS NFR BLD: 0.9 %
BILIRUB SERPL-MCNC: 0.6 MG/DL
BUN SERPL-MCNC: 13 MG/DL
CALCIUM SERPL-MCNC: 9.6 MG/DL
CHLORIDE SERPL-SCNC: 103 MMOL/L
CO2 SERPL-SCNC: 27 MMOL/L
CREAT SERPL-MCNC: 1 MG/DL
DIFFERENTIAL METHOD: ABNORMAL
EOSINOPHIL # BLD AUTO: 0.2 K/UL
EOSINOPHIL NFR BLD: 3.4 %
ERYTHROCYTE [DISTWIDTH] IN BLOOD BY AUTOMATED COUNT: 11.9 %
EST. GFR  (AFRICAN AMERICAN): >60 ML/MIN/1.73 M^2
EST. GFR  (NON AFRICAN AMERICAN): >60 ML/MIN/1.73 M^2
GLUCOSE SERPL-MCNC: 92 MG/DL
HCT VFR BLD AUTO: 49.9 %
HGB BLD-MCNC: 16.6 G/DL
IMM GRANULOCYTES # BLD AUTO: 0.15 K/UL
IMM GRANULOCYTES NFR BLD AUTO: 2.1 %
LYMPHOCYTES # BLD AUTO: 2 K/UL
LYMPHOCYTES NFR BLD: 27.7 %
MCH RBC QN AUTO: 29.1 PG
MCHC RBC AUTO-ENTMCNC: 33.3 G/DL
MCV RBC AUTO: 88 FL
MONOCYTES # BLD AUTO: 0.6 K/UL
MONOCYTES NFR BLD: 8.9 %
NEUTROPHILS # BLD AUTO: 4 K/UL
NEUTROPHILS NFR BLD: 57 %
NRBC BLD-RTO: 0 /100 WBC
PLATELET # BLD AUTO: 208 K/UL
PMV BLD AUTO: 9.5 FL
POTASSIUM SERPL-SCNC: 4.1 MMOL/L
PROT SERPL-MCNC: 7.8 G/DL
RBC # BLD AUTO: 5.7 M/UL
SODIUM SERPL-SCNC: 137 MMOL/L
WBC # BLD AUTO: 7.04 K/UL

## 2018-07-12 PROCEDURE — 85025 COMPLETE CBC W/AUTO DIFF WBC: CPT

## 2018-07-12 PROCEDURE — 36415 COLL VENOUS BLD VENIPUNCTURE: CPT

## 2018-07-12 PROCEDURE — 99999 PR PBB SHADOW E&M-EST. PATIENT-LVL III: CPT | Mod: PBBFAC,,, | Performed by: SURGERY

## 2018-07-12 PROCEDURE — 3008F BODY MASS INDEX DOCD: CPT | Mod: CPTII,S$GLB,, | Performed by: SURGERY

## 2018-07-12 PROCEDURE — 99213 OFFICE O/P EST LOW 20 MIN: CPT | Mod: S$GLB,,, | Performed by: SURGERY

## 2018-07-12 PROCEDURE — 80053 COMPREHEN METABOLIC PANEL: CPT

## 2018-07-12 RX ORDER — METRONIDAZOLE 500 MG/1
500 TABLET ORAL 3 TIMES DAILY
Qty: 21 TABLET | Refills: 0 | Status: SHIPPED | OUTPATIENT
Start: 2018-07-12 | End: 2018-08-09 | Stop reason: SDUPTHER

## 2018-07-12 RX ORDER — CIPROFLOXACIN 500 MG/1
500 TABLET ORAL 2 TIMES DAILY
Qty: 14 TABLET | Refills: 0 | Status: SHIPPED | OUTPATIENT
Start: 2018-07-12 | End: 2018-08-09 | Stop reason: SDUPTHER

## 2018-07-12 NOTE — LETTER
July 12, 2018        David Kim, DO  [address] [facility] [place] [address]             [facility] - General Surgery  [address]  [place] [address]  Phone: [phone]   Patient: Donal Serrano   MR Number: [id number]   YOB: 1990   Date of Visit: 7/12/2018       Dear Dr. Kim:    Thank you for referring Donal Serrano to me for evaluation. Attached you will find relevant portions of my assessment and plan of care.    Assessment:       1. Acute appendicitis with localized peritonitis        Plan:      Awaiting blood work.  Will discuss antibiotics with ID.    If you have questions, please do not hesitate to call me. I look forward to following Donal Serrano along with you.    Sincerely,      Saulo Powell MD            CC  No Recipients    Enclosure

## 2018-07-12 NOTE — TELEPHONE ENCOUNTER
Sent message from Dr. Powell.  Patient still with some pain. WBC better.  Will continue ABX 7 more days.  Follow up with me in 1 week.

## 2018-07-12 NOTE — PROGRESS NOTES
Subjective:       Patient ID: Donal Serrano is a 28 y.o. male.    Chief Complaint: No chief complaint on file.    HPI Follow up for non-operative treatment of acute appendicitis.  His pain is near resolved but he gets infrequent minor pangs in the rlq, non-radiating.  He is eating normally and bowel habits are normal.  Today is his last day of antibiotics.  Review of Systems   Constitutional: Negative for fever and unexpected weight change.   Respiratory: Negative for chest tightness and shortness of breath.    Cardiovascular: Negative for chest pain.   Gastrointestinal: Negative for constipation, diarrhea, nausea and vomiting.   Genitourinary: Negative for difficulty urinating and dysuria.   Hematological: Does not bruise/bleed easily.       Objective:      Physical Exam   Constitutional: He is oriented to person, place, and time. He appears well-developed and well-nourished.   Abdominal: Soft. Bowel sounds are normal. There is tenderness in the right lower quadrant. There is no rigidity and no guarding.   Neurological: He is alert and oriented to person, place, and time.   Skin: Skin is warm and dry.   Psychiatric: He has a normal mood and affect. His behavior is normal. Judgment and thought content normal.   Vitals reviewed.      Assessment:       1. Acute appendicitis with localized peritonitis        Plan:      Awaiting blood work.  Will discuss antibiotics with ID.  Follow up one month.  For henry ferrera in about 5-6 months.

## 2018-07-19 ENCOUNTER — OFFICE VISIT (OUTPATIENT)
Dept: INFECTIOUS DISEASES | Facility: CLINIC | Age: 28
End: 2018-07-19
Payer: COMMERCIAL

## 2018-07-19 ENCOUNTER — HOSPITAL ENCOUNTER (OUTPATIENT)
Dept: RADIOLOGY | Facility: HOSPITAL | Age: 28
Discharge: HOME | End: 2018-07-19
Attending: INTERNAL MEDICINE
Payer: COMMERCIAL

## 2018-07-19 VITALS
SYSTOLIC BLOOD PRESSURE: 134 MMHG | DIASTOLIC BLOOD PRESSURE: 91 MMHG | BODY MASS INDEX: 25.11 KG/M2 | TEMPERATURE: 98 F | WEIGHT: 160 LBS | HEIGHT: 67 IN | HEART RATE: 78 BPM

## 2018-07-19 DIAGNOSIS — K35.30 ACUTE APPENDICITIS WITH LOCALIZED PERITONITIS: Primary | ICD-10-CM

## 2018-07-19 DIAGNOSIS — R10.84 GENERALIZED ABDOMINAL PAIN: ICD-10-CM

## 2018-07-19 DIAGNOSIS — R74.8 ELEVATED LIVER ENZYMES: ICD-10-CM

## 2018-07-19 DIAGNOSIS — I10 HYPERTENSION, UNSPECIFIED TYPE: ICD-10-CM

## 2018-07-19 PROCEDURE — 74178 CT ABD&PLV WO CNTR FLWD CNTR: CPT | Mod: TC

## 2018-07-19 PROCEDURE — 3008F BODY MASS INDEX DOCD: CPT | Mod: CPTII,S$GLB,, | Performed by: INTERNAL MEDICINE

## 2018-07-19 PROCEDURE — 25500020 PHARM REV CODE 255: Performed by: INTERNAL MEDICINE

## 2018-07-19 PROCEDURE — 74178 CT ABD&PLV WO CNTR FLWD CNTR: CPT | Mod: 26,,, | Performed by: RADIOLOGY

## 2018-07-19 PROCEDURE — 99215 OFFICE O/P EST HI 40 MIN: CPT | Mod: S$GLB,,, | Performed by: INTERNAL MEDICINE

## 2018-07-19 PROCEDURE — 99999 PR PBB SHADOW E&M-EST. PATIENT-LVL III: CPT | Mod: PBBFAC,,, | Performed by: INTERNAL MEDICINE

## 2018-07-19 RX ADMIN — IOHEXOL 75 ML: 350 INJECTION, SOLUTION INTRAVENOUS at 11:07

## 2018-07-19 RX ADMIN — IOHEXOL 30 ML: 350 INJECTION, SOLUTION INTRAVENOUS at 09:07

## 2018-07-20 ENCOUNTER — TELEPHONE (OUTPATIENT)
Dept: INFECTIOUS DISEASES | Facility: CLINIC | Age: 28
End: 2018-07-20

## 2018-07-20 ENCOUNTER — PATIENT MESSAGE (OUTPATIENT)
Dept: INFECTIOUS DISEASES | Facility: CLINIC | Age: 28
End: 2018-07-20

## 2018-07-20 DIAGNOSIS — R74.8 ELEVATED LIVER ENZYMES: Primary | ICD-10-CM

## 2018-07-20 NOTE — TELEPHONE ENCOUNTER
Patient's pain improved. Bowels regular with no diarrhea or constipation.  Has 2 days of antibiotics left and will complete as directed.  He will call if any change.

## 2018-07-23 ENCOUNTER — LAB VISIT (OUTPATIENT)
Dept: LAB | Facility: HOSPITAL | Age: 28
End: 2018-07-23
Payer: COMMERCIAL

## 2018-07-23 ENCOUNTER — OFFICE VISIT (OUTPATIENT)
Dept: HEPATOLOGY | Facility: CLINIC | Age: 28
End: 2018-07-23
Payer: COMMERCIAL

## 2018-07-23 VITALS
DIASTOLIC BLOOD PRESSURE: 90 MMHG | HEIGHT: 67 IN | HEART RATE: 85 BPM | RESPIRATION RATE: 18 BRPM | SYSTOLIC BLOOD PRESSURE: 145 MMHG | BODY MASS INDEX: 25.44 KG/M2 | OXYGEN SATURATION: 96 % | WEIGHT: 162.06 LBS

## 2018-07-23 DIAGNOSIS — R74.8 ELEVATED LIVER ENZYMES: Primary | ICD-10-CM

## 2018-07-23 DIAGNOSIS — R74.8 ELEVATED LIVER ENZYMES: ICD-10-CM

## 2018-07-23 LAB
ALBUMIN SERPL BCP-MCNC: 4.3 G/DL
ALP SERPL-CCNC: 71 U/L
ALT SERPL W/O P-5'-P-CCNC: 150 U/L
AST SERPL-CCNC: 64 U/L
BILIRUB DIRECT SERPL-MCNC: 0.3 MG/DL
BILIRUB SERPL-MCNC: 0.6 MG/DL
IGG SERPL-MCNC: 1518 MG/DL
IGM SERPL-MCNC: 119 MG/DL
PROT SERPL-MCNC: 7.8 G/DL

## 2018-07-23 PROCEDURE — 86235 NUCLEAR ANTIGEN ANTIBODY: CPT | Mod: 91

## 2018-07-23 PROCEDURE — 36415 COLL VENOUS BLD VENIPUNCTURE: CPT

## 2018-07-23 PROCEDURE — 80076 HEPATIC FUNCTION PANEL: CPT

## 2018-07-23 PROCEDURE — 99203 OFFICE O/P NEW LOW 30 MIN: CPT | Mod: S$GLB,,, | Performed by: NURSE PRACTITIONER

## 2018-07-23 PROCEDURE — 86256 FLUORESCENT ANTIBODY TITER: CPT

## 2018-07-23 PROCEDURE — 82784 ASSAY IGA/IGD/IGG/IGM EACH: CPT

## 2018-07-23 PROCEDURE — 3008F BODY MASS INDEX DOCD: CPT | Mod: CPTII,S$GLB,, | Performed by: NURSE PRACTITIONER

## 2018-07-23 PROCEDURE — 82784 ASSAY IGA/IGD/IGG/IGM EACH: CPT | Mod: 59

## 2018-07-23 PROCEDURE — 86038 ANTINUCLEAR ANTIBODIES: CPT

## 2018-07-23 PROCEDURE — 99999 PR PBB SHADOW E&M-EST. PATIENT-LVL III: CPT | Mod: PBBFAC,,, | Performed by: NURSE PRACTITIONER

## 2018-07-23 RX ORDER — CIPROFLOXACIN 500 MG/1
500 TABLET ORAL
COMMUNITY
End: 2018-07-31 | Stop reason: ALTCHOICE

## 2018-07-23 RX ORDER — METRONIDAZOLE 500 MG/1
500 TABLET ORAL 3 TIMES DAILY
COMMUNITY
End: 2018-07-31 | Stop reason: ALTCHOICE

## 2018-07-23 NOTE — PROGRESS NOTES
I have reviewed and concur with the SHINE's history, physical, assessment, and plan.  I have personally interviewed and examined the patient at bedside.  See below addendum for my evaluation and additional findings.     28 y.o. male that presents for evaluation of elevated liver tests.  Recently traveling for a mission trip and developed abdominal pain with concern for appendicitis.  F/u CT scan without evidence of acute appendicitis but remains on cipro/flagyl.  Transaminases elevated x 2 weeks.  At this time greatest suspicion for benign infectious source.  Acute hepatitis panel is negative.  We will obtain focused serologic w/u including hepatitis E.  US scheduled for tomorrow.  We will continue to monitor and if liver tests do not normalize over the next few weeks, complete serologic w/u and further investigation.      Patient's father with history of KHALIL requiring transplantation.  Patient without obvious risk factors for chronic liver disease at this time.      Patient will return to clinic with Destiny Ricardo NP

## 2018-07-23 NOTE — LETTER
July 23, 2018      Katherine L. Baumgarten, MD  [address]  [place] [address]           [facility] - Hepatology  [address]  [facility] [address]  Phone: [phone]  Fax: [phone]          Patient: Donal Serrano   MR Number: [id number]   YOB: 1990   Date of Visit: 7/23/2018       Dear Dr. Katherine L. Baumgarten:    Thank you for referring Donal Serrano to me for evaluation. Attached you will find relevant portions of my assessment and plan of care.    If you have questions, please do not hesitate to call me. I look forward to following Donal Serrano along with you.    Sincerely,    Destiny Ricardo, NP    Enclosure  CC:  No Recipients    If you would like to receive this communication electronically, please contact [email or web address] or [phone] to request more information on [employer] Link access.    For providers and/or their staff who would like to refer a patient to [facility], please contact us through our one-stop-shop provider referral line, [facility], at [phone].    If you feel you have received this communication in error or would no longer like to receive these types of communications, please e-mail [email or web address]

## 2018-07-24 ENCOUNTER — HOSPITAL ENCOUNTER (OUTPATIENT)
Dept: RADIOLOGY | Facility: HOSPITAL | Age: 28
Discharge: HOME | End: 2018-07-24
Attending: INTERNAL MEDICINE
Payer: COMMERCIAL

## 2018-07-24 DIAGNOSIS — R74.8 ELEVATED LIVER ENZYMES: ICD-10-CM

## 2018-07-24 LAB
ANA SER QL IF: NORMAL
MITOCHONDRIA AB TITR SER IF: NORMAL {TITER}

## 2018-07-24 PROCEDURE — 76705 ECHO EXAM OF ABDOMEN: CPT | Mod: 26,,, | Performed by: RADIOLOGY

## 2018-07-24 PROCEDURE — 76705 ECHO EXAM OF ABDOMEN: CPT | Mod: TC

## 2018-07-25 LAB — SMOOTH MUSCLE AB TITR SER IF: NORMAL {TITER}

## 2018-07-30 ENCOUNTER — LAB VISIT (OUTPATIENT)
Dept: LAB | Facility: HOSPITAL | Age: 28
End: 2018-07-30
Attending: NURSE PRACTITIONER
Payer: COMMERCIAL

## 2018-07-30 DIAGNOSIS — R74.8 ELEVATED LIVER ENZYMES: ICD-10-CM

## 2018-07-30 LAB
ALBUMIN SERPL BCP-MCNC: 4.3 G/DL
ALP SERPL-CCNC: 65 U/L
ALT SERPL W/O P-5'-P-CCNC: 140 U/L
AST SERPL-CCNC: 66 U/L
BILIRUB DIRECT SERPL-MCNC: 0.4 MG/DL
BILIRUB SERPL-MCNC: 1.2 MG/DL
PROT SERPL-MCNC: 7.9 G/DL

## 2018-07-30 PROCEDURE — 80076 HEPATIC FUNCTION PANEL: CPT

## 2018-07-30 PROCEDURE — 36415 COLL VENOUS BLD VENIPUNCTURE: CPT | Mod: PO

## 2018-07-31 ENCOUNTER — OFFICE VISIT (OUTPATIENT)
Dept: INTERNAL MEDICINE | Facility: CLINIC | Age: 28
End: 2018-07-31
Payer: COMMERCIAL

## 2018-07-31 VITALS
RESPIRATION RATE: 18 BRPM | TEMPERATURE: 98 F | SYSTOLIC BLOOD PRESSURE: 146 MMHG | BODY MASS INDEX: 25.6 KG/M2 | HEIGHT: 67 IN | DIASTOLIC BLOOD PRESSURE: 104 MMHG | WEIGHT: 163.13 LBS | HEART RATE: 82 BPM

## 2018-07-31 DIAGNOSIS — R19.8 PAIN ASSOCIATED WITH DEFECATION: ICD-10-CM

## 2018-07-31 DIAGNOSIS — K64.9 HEMORRHOIDS, UNSPECIFIED HEMORRHOID TYPE: Primary | ICD-10-CM

## 2018-07-31 DIAGNOSIS — I10 ESSENTIAL HYPERTENSION: ICD-10-CM

## 2018-07-31 PROCEDURE — 99214 OFFICE O/P EST MOD 30 MIN: CPT | Mod: S$GLB,,, | Performed by: INTERNAL MEDICINE

## 2018-07-31 PROCEDURE — 3008F BODY MASS INDEX DOCD: CPT | Mod: CPTII,S$GLB,, | Performed by: INTERNAL MEDICINE

## 2018-07-31 PROCEDURE — 99999 PR PBB SHADOW E&M-EST. PATIENT-LVL III: CPT | Mod: PBBFAC,,, | Performed by: INTERNAL MEDICINE

## 2018-07-31 RX ORDER — LOSARTAN POTASSIUM AND HYDROCHLOROTHIAZIDE 12.5; 5 MG/1; MG/1
1 TABLET ORAL DAILY
Qty: 30 TABLET | Refills: 0 | Status: SHIPPED | OUTPATIENT
Start: 2018-07-31 | End: 2018-08-29 | Stop reason: SDUPTHER

## 2018-07-31 RX ORDER — HYDROCORTISONE ACETATE 25 MG/1
25 SUPPOSITORY RECTAL 2 TIMES DAILY
Qty: 20 SUPPOSITORY | Refills: 0 | Status: SHIPPED | OUTPATIENT
Start: 2018-07-31 | End: 2018-08-10

## 2018-07-31 NOTE — PROGRESS NOTES
Subjective:       Patient ID: Donal Serrano is a 28 y.o. male.    Chief Complaint: Hemorrhoids    HPI   Pt with HTN, hx of hemorrhoids is here for evaluation of painful defecation a/w hemorrhoids which has been present for the last few days. It has been worse with diarrhea. No bleeding. He can feel hemorrhoids with wiping.   Review of Systems   Constitutional: Negative for activity change, appetite change, chills, diaphoresis, fatigue, fever and unexpected weight change.   HENT: Negative for postnasal drip, rhinorrhea, sinus pressure, sneezing, sore throat, trouble swallowing and voice change.    Respiratory: Negative for cough, shortness of breath and wheezing.    Cardiovascular: Negative for chest pain, palpitations and leg swelling.   Gastrointestinal: Positive for rectal pain. Negative for abdominal distention, abdominal pain, anal bleeding, blood in stool, constipation, diarrhea, nausea and vomiting.   Genitourinary: Negative for dysuria.   Musculoskeletal: Negative for arthralgias and myalgias.   Skin: Negative for rash and wound.   Allergic/Immunologic: Negative for environmental allergies and food allergies.   Hematological: Negative for adenopathy. Does not bruise/bleed easily.       Objective:      Physical Exam   Constitutional: He is oriented to person, place, and time. He appears well-developed and well-nourished. No distress.   HENT:   Head: Normocephalic and atraumatic.   Eyes: Conjunctivae and EOM are normal. Pupils are equal, round, and reactive to light. Right eye exhibits no discharge. Left eye exhibits no discharge. No scleral icterus.   Neck: Normal range of motion. Neck supple. No JVD present.   Cardiovascular: Normal rate, regular rhythm and normal heart sounds.    No murmur heard.  Pulmonary/Chest: Effort normal and breath sounds normal. No respiratory distress. He has no wheezes. He has no rales.   Genitourinary: Rectal exam shows external hemorrhoid. Rectal exam shows no tenderness.    Musculoskeletal: He exhibits no edema.   Lymphadenopathy:     He has no cervical adenopathy.   Neurological: He is alert and oriented to person, place, and time.   Skin: Skin is warm and dry. No rash noted. He is not diaphoretic. No pallor.       Assessment:       1. Hemorrhoids, unspecified hemorrhoid type    2. Pain associated with defecation    3. Essential hypertension        Plan:    1/2. Referral to Colorectal           Rx Anusol HC BID          Increase intake of fiber   3. Rx Hyzaar 50-12.5 mg daily   4. F/u in 2 weeks for HTN

## 2018-08-03 ENCOUNTER — OFFICE VISIT (OUTPATIENT)
Dept: SURGERY | Facility: CLINIC | Age: 28
End: 2018-08-03
Payer: COMMERCIAL

## 2018-08-03 VITALS
HEART RATE: 71 BPM | WEIGHT: 163.56 LBS | SYSTOLIC BLOOD PRESSURE: 143 MMHG | BODY MASS INDEX: 25.67 KG/M2 | DIASTOLIC BLOOD PRESSURE: 84 MMHG | HEIGHT: 67 IN

## 2018-08-03 DIAGNOSIS — K60.2 ANAL FISSURE: Primary | ICD-10-CM

## 2018-08-03 DIAGNOSIS — K64.9 HEMORRHOIDS, UNSPECIFIED HEMORRHOID TYPE: ICD-10-CM

## 2018-08-03 DIAGNOSIS — R19.8 PAIN ASSOCIATED WITH DEFECATION: ICD-10-CM

## 2018-08-03 PROCEDURE — 99999 PR PBB SHADOW E&M-EST. PATIENT-LVL III: CPT | Mod: PBBFAC,,, | Performed by: COLON & RECTAL SURGERY

## 2018-08-03 PROCEDURE — 99203 OFFICE O/P NEW LOW 30 MIN: CPT | Mod: S$GLB,,, | Performed by: COLON & RECTAL SURGERY

## 2018-08-03 PROCEDURE — 3008F BODY MASS INDEX DOCD: CPT | Mod: CPTII,S$GLB,, | Performed by: COLON & RECTAL SURGERY

## 2018-08-03 RX ORDER — LIDOCAINE 50 MG/G
OINTMENT TOPICAL DAILY
Qty: 37.5 G | Refills: 5 | Status: SHIPPED | OUTPATIENT
Start: 2018-08-03 | End: 2018-08-31

## 2018-08-03 NOTE — LETTER
August 3, 2018      David Kim, DO  [address] [facility]  [place] [address]           [facility]  [address]  [place] [address]  Phone: [phone]          Patient: Donal Serrano   MR Number: [id number]   YOB: 1990   Date of Visit: 8/3/2018       Dear Dr. David Kim:    Thank you for referring Donal Serrano to me for evaluation. Attached you will find relevant portions of my assessment and plan of care.    If you have questions, please do not hesitate to call me. I look forward to following Donal Serrano along with you.    Sincerely,    Saulo Ludwig MD    Enclosure  CC:  No Recipients    If you would like to receive this communication electronically, please contact [email or web address] or [phone] to request more information on [employer] Link access.    For providers and/or their staff who would like to refer a patient to [facility], please contact us through our one-stop-shop provider referral line, [facility], at [phone].    If you feel you have received this communication in error or would no longer like to receive these types of communications, please e-mail [email or web address]

## 2018-08-03 NOTE — PROGRESS NOTES
CRS Office Visit History and Physical    Referring Md:   David Kim, Do  [address] [facility]  [place] [address]    SUBJECTIVE:     Chief Complaint: hemorrhoids    History of Present Illness:  Patient is a 28 y.o. male presents with hemorrhoids. The patient is a new patient to this practice.   Course is as follows:  28-year-old gentleman who presents with perianal pain and concern for hemorrhoids.  He has a history of incarcerated prolapsed internal hemorrhoids when he was in high school that required emergent hemorrhoidectomy.  He still complains of intermittent prolapse of tissue with bowel movements the does have to be manually reduced.  Approximately 5 days ago, he noted significant onset of perianal pain. No associated bleeding.  Pain is only with bowel movements.  Patient does report a tearing sensation with bowel movements.  No family history of inflammatory bowel disease.  No family history of colon or rectal cancer.  He has 2 bowel movements per day and in 2-3 minutes on the toilet for each bowel movement.  He relates the change in his hemorrhoids and perianal pain to taking multiple courses of antibiotics for presumed appendicitis when [place] was out of the country.    Last Colonoscopy:  None    Review of patient's allergies indicates:  No Known Allergies    Past Medical History:   Diagnosis Date    Hypertension     Keloid cicatrix     Kidney stones      Past Surgical History:   Procedure Laterality Date    CHOLECYSTECTOMY  03/2012     Family History   Problem Relation Age of Onset    Skin cancer Maternal Grandfather     Melanoma Maternal Grandfather     Hypertension Mother     Cancer Father         Liver    Cirrhosis Father     Skin cancer Paternal Grandfather     Heart disease Maternal Uncle     Stroke Neg Hx      Social History   Substance Use Topics    Smoking status: Never Smoker    Smokeless tobacco: Never Used    Alcohol use No        Review of Systems:  Review of  "Systems   Constitutional: Negative for chills, diaphoresis, fever, malaise/fatigue and weight loss.   HENT: Negative for congestion.    Respiratory: Negative for shortness of breath.    Cardiovascular: Negative for chest pain and leg swelling.   Gastrointestinal: Negative for abdominal pain, blood in stool, constipation, nausea and vomiting.   Genitourinary: Negative for dysuria.   Musculoskeletal: Negative for back pain and myalgias.   Skin: Negative for rash.   Neurological: Negative for dizziness and weakness.   Endo/Heme/Allergies: Does not bruise/bleed easily.   Psychiatric/Behavioral: Negative for depression.       OBJECTIVE:     Vital Signs (Most Recent)  BP (!) 143/84   Pulse 71   Ht 5' 7.01" (1.702 m)   Wt 74.2 kg (163 lb 9.3 oz)   BMI 25.61 kg/m²     Physical Exam:  General: White male in no distress   Neuro: alert and oriented x 4.  Moves all extremities.     HEENT: no icterus.  Trachea midline  Respiratory: respirations are even and unlabored  Cardiac: regular rate  Abdomen:  Soft, nontender, no masses  Extremities: Warm dry and intact  Skin: no rashes  Anorectal:  External exam demonstrates small external skin tag seen in the left lateral position.  He is focally tender just at the anal canal in the left lateral side.  Digital exam was very painful for him.  No fissure could be seen on eversion of the anal canal.  Further examination was ceased    Labs: none    Imaging: none      ASSESSMENT/PLAN:     Diagnoses and all orders for this visit:    Anal fissure    Other orders  -     diltiazem HCl (DILTIAZEM 2% CREAM); Apply topically 3 (three) times daily. Apply topically to anal area.  -     lidocaine (XYLOCAINE) 5 % Oint ointment; Apply topically once daily.      28-year-old gentleman with significant perianal pain consistent with an anal fissure.  Based on his history, he also likely has grade 3 internal hemorrhoids but those were unable to be visualized today secondary to his discomfort.  We will " plan to 1st treat his anal fissure and then can address his hemorrhoids following resolution of his perianal pain.  I will plan on seeing him back in 3 weeks.    We discussed how anal fissures for from either mechanical disruption of the anoderm from passage of large or hard bowel movements or poor vascularity of the midline of the anal canal.    Fissures have approximately 60-80% healing rate from topical diltiazem or nefedipine.  For those that do not respond to dietary modification, behavioral changes, and topical treatment, there are two options for treatment: botox injection or lateral internal sphincterotomy.  We discussed that botox is ~75% effective at healing fissures and has the risks of temporary incontinence to flatus.  Lateral internal sphincterotomy has a 99% effective rate but is more invasive and involves division of part of the internal sphincter and increases the potential for fecal incontinence.  Fissures are often associated with hypertonia of the internal anal sphincter.  Limited sphincterotomy will release the tension on the internal anal sphincter but the remaining sphincter pressure will still often be higher than average pressure.     OTTONIEL Ludwig MD  Staff Surgeon  Colon & Rectal Surgery

## 2018-08-06 ENCOUNTER — LAB VISIT (OUTPATIENT)
Dept: LAB | Facility: HOSPITAL | Age: 28
End: 2018-08-06
Attending: NURSE PRACTITIONER
Payer: COMMERCIAL

## 2018-08-06 DIAGNOSIS — R74.8 ELEVATED LIVER ENZYMES: ICD-10-CM

## 2018-08-06 LAB
ALBUMIN SERPL BCP-MCNC: 4.2 G/DL
ALP SERPL-CCNC: 66 U/L
ALT SERPL W/O P-5'-P-CCNC: 77 U/L
AST SERPL-CCNC: 32 U/L
BILIRUB DIRECT SERPL-MCNC: 0.3 MG/DL
BILIRUB SERPL-MCNC: 0.7 MG/DL
PROT SERPL-MCNC: 7.7 G/DL

## 2018-08-06 PROCEDURE — 80076 HEPATIC FUNCTION PANEL: CPT

## 2018-08-06 PROCEDURE — 36415 COLL VENOUS BLD VENIPUNCTURE: CPT | Mod: PO

## 2018-08-07 ENCOUNTER — TELEPHONE (OUTPATIENT)
Dept: HEPATOLOGY | Facility: CLINIC | Age: 28
End: 2018-08-07

## 2018-08-07 ENCOUNTER — PATIENT MESSAGE (OUTPATIENT)
Dept: HEPATOLOGY | Facility: CLINIC | Age: 28
End: 2018-08-07

## 2018-08-07 DIAGNOSIS — R74.8 ELEVATED LIVER ENZYMES: Primary | ICD-10-CM

## 2018-08-07 NOTE — TELEPHONE ENCOUNTER
----- Message from Kizzy Dillard sent at 8/7/2018 10:55 AM CDT -----  Contact: patient   Patient returning a call from Yaron about scheduling an appointment.           Please call [phone]      Thanks!

## 2018-08-07 NOTE — TELEPHONE ENCOUNTER
Message from Destiny Ricardo NP;  Please call pt to schedule lab for hepatic panel and [facility] sendout test Dr. Dupree ordered to be done on 8/20 or 8/24 (whatever day pt prefers) and lab for hepatic panel on 9/10.    Destiny sent a message to the patient and he was returning call to Destiny.    Patient called on Mobile number [phone]. No Answer.  Left message that I was returning the call with a message from Destiny Ricardo NP.  We have scheduled you for Labs (Hepatic Panel and Hepatitis E virus IgM) on Friday 8/24/18 at 10 am when you return to the hospital.  Also labs are scheduled on Monday 9/10/18 at 9 am when you return to the hospital.    Will send the patient a My Chart message since we are unable to reach the patient.

## 2018-08-07 NOTE — TELEPHONE ENCOUNTER
Please call pt to schedule lab for hepatic panel and [facility] sendout test Dr. Dupree ordered to be done on 8/20 or 8/24 (whatever day pt prefers) and lab for hepatic panel on 9/10

## 2018-08-09 ENCOUNTER — OFFICE VISIT (OUTPATIENT)
Dept: SURGERY | Facility: CLINIC | Age: 28
End: 2018-08-09
Payer: COMMERCIAL

## 2018-08-09 ENCOUNTER — OFFICE VISIT (OUTPATIENT)
Dept: INFECTIOUS DISEASES | Facility: CLINIC | Age: 28
End: 2018-08-09
Payer: COMMERCIAL

## 2018-08-09 VITALS
DIASTOLIC BLOOD PRESSURE: 97 MMHG | SYSTOLIC BLOOD PRESSURE: 130 MMHG | WEIGHT: 163 LBS | HEIGHT: 67 IN | HEART RATE: 81 BPM | BODY MASS INDEX: 25.58 KG/M2 | TEMPERATURE: 99 F

## 2018-08-09 VITALS
SYSTOLIC BLOOD PRESSURE: 133 MMHG | HEART RATE: 72 BPM | WEIGHT: 164 LBS | TEMPERATURE: 99 F | HEIGHT: 67 IN | DIASTOLIC BLOOD PRESSURE: 92 MMHG | BODY MASS INDEX: 25.74 KG/M2

## 2018-08-09 DIAGNOSIS — K35.30 ACUTE APPENDICITIS WITH LOCALIZED PERITONITIS: Primary | ICD-10-CM

## 2018-08-09 DIAGNOSIS — R74.8 ELEVATED LIVER ENZYMES: ICD-10-CM

## 2018-08-09 DIAGNOSIS — I10 ESSENTIAL HYPERTENSION: ICD-10-CM

## 2018-08-09 DIAGNOSIS — K37 APPENDICITIS, UNSPECIFIED APPENDICITIS TYPE: Primary | ICD-10-CM

## 2018-08-09 PROCEDURE — 3008F BODY MASS INDEX DOCD: CPT | Mod: CPTII,S$GLB,, | Performed by: SURGERY

## 2018-08-09 PROCEDURE — 99213 OFFICE O/P EST LOW 20 MIN: CPT | Mod: S$GLB,,, | Performed by: SURGERY

## 2018-08-09 PROCEDURE — 99215 OFFICE O/P EST HI 40 MIN: CPT | Mod: S$GLB,,, | Performed by: INTERNAL MEDICINE

## 2018-08-09 PROCEDURE — 99999 PR PBB SHADOW E&M-EST. PATIENT-LVL III: CPT | Mod: PBBFAC,,, | Performed by: INTERNAL MEDICINE

## 2018-08-09 PROCEDURE — 99999 PR PBB SHADOW E&M-EST. PATIENT-LVL III: CPT | Mod: PBBFAC,,, | Performed by: SURGERY

## 2018-08-09 PROCEDURE — 3008F BODY MASS INDEX DOCD: CPT | Mod: CPTII,S$GLB,, | Performed by: INTERNAL MEDICINE

## 2018-08-09 RX ORDER — CIPROFLOXACIN 500 MG/1
500 TABLET ORAL 2 TIMES DAILY
Qty: 14 TABLET | Refills: 0 | Status: SHIPPED | OUTPATIENT
Start: 2018-08-09 | End: 2018-08-16

## 2018-08-09 RX ORDER — METRONIDAZOLE 500 MG/1
500 TABLET ORAL 3 TIMES DAILY
Qty: 21 TABLET | Refills: 0 | Status: SHIPPED | OUTPATIENT
Start: 2018-08-09 | End: 2018-08-16

## 2018-08-09 NOTE — PROGRESS NOTES
Subjective:      Patient ID: Donal Serrano is a 28 y.o. male.    Chief Complaint:Follow-up      History of Present Illness    Here today for follow up of appendicitis.  Overall feeling much better. Still some occasional pain.  Completed cipro and flagyl as directed about 1 week ago.  Denies fever or chills.  Planning to travel to [place] for mission trip.  Did see hepatology and US fine. Will f/u with them as well.   Also saw primary MD and started BP medication. Has f/u scheduled.  From my initial note 7/5/18:  Here today for follow up after recent travel. Went to [place]. After 2 days there while headed to [facility] in [facility] developed abdominal pain and nausea. Progressed to diarrhea. No fever recorded. No blood in stool. Pain then became more localized to right side of abdomin. Started tinidazole and norfloxacin. Took imodium while in [place] prior to travelling home. Has not taken it since. No longer has diarrhea.  Feels weak. Appetite improving. Lost about 5 pounds. Now on cipro and flagyl. Also remains on Malarone.  One other traveller developed diarrhea.  Has been following BP with primary MD. Strong family h/o HTN maternal side of family.    Review of Systems   Constitution: Negative for chills, decreased appetite, fever, weakness, malaise/fatigue, night sweats, weight gain and weight loss.   HENT: Negative for congestion, ear pain, hearing loss, hoarse voice, sore throat and tinnitus.    Eyes: Negative for blurred vision, redness and visual disturbance.   Cardiovascular: Negative for chest pain, leg swelling and palpitations.   Respiratory: Negative for cough, hemoptysis, shortness of breath and sputum production.    Hematologic/Lymphatic: Negative for adenopathy. Does not bruise/bleed easily.   Skin: Negative for dry skin, itching, rash and suspicious lesions.   Musculoskeletal: Negative for back pain, joint pain, myalgias and neck pain.   Gastrointestinal: Positive for diarrhea. Negative for  abdominal pain, constipation, heartburn, nausea and vomiting.   Genitourinary: Negative for dysuria, flank pain, frequency, hematuria, hesitancy and urgency.   Neurological: Positive for headaches. Negative for dizziness, numbness and paresthesias.   Psychiatric/Behavioral: Negative for depression and memory loss. The patient does not have insomnia and is not nervous/anxious.      Objective:   Physical Exam   Constitutional: He is oriented to person, place, and time. He appears well-developed and well-nourished. He is cooperative.  Non-toxic appearance. No distress.   HENT:   Head: Normocephalic and atraumatic. Head is without right periorbital erythema and without left periorbital erythema.   Right Ear: External ear normal.   Left Ear: External ear normal.   Nose: Nose normal.   Mouth/Throat: Oropharynx is clear and moist.   Eyes: EOM and lids are normal. Pupils are equal, round, and reactive to light. Right eye exhibits no discharge. Left eye exhibits no discharge. Right conjunctiva is not injected. Left conjunctiva is not injected. No scleral icterus.   Neck: Normal range of motion. No erythema present.   Cardiovascular: Normal rate.    Pulmonary/Chest: Effort normal and breath sounds normal. No stridor. No tachypnea. No respiratory distress.   Abdominal: Soft. Normal appearance and bowel sounds are normal. He exhibits no distension. There is no tenderness. There is no guarding.   Musculoskeletal: Normal range of motion. He exhibits no edema.   Neurological: He is alert and oriented to person, place, and time. No cranial nerve deficit. Coordination normal.   Skin: Skin is warm and dry. No rash noted. He is not diaphoretic. No erythema.   Psychiatric: He has a normal mood and affect. His speech is normal and behavior is normal. Judgment and thought content normal. Cognition and memory are normal.   Nursing note and vitals reviewed.    Assessment:       1. Appendicitis, unspecified appendicitis type    2. Elevated  liver enzymes    3. Essential hypertension          Plan:       Reviewed labs with patient and his mom.  Also discussed precautions during travel to [place].  Refilled cipro and flagyl so he can have it on hand during travel.  Discussed with him to have a plan in place for returning if symptoms resume.  He will f/u with primary MD and hepatology.  Appendectomy planned for January.  RTC PRN.

## 2018-08-09 NOTE — PROGRESS NOTES
Subjective:       Patient ID: Donal Serrano is a 28 y.o. male.    Chief Complaint: Follow-up    HPI S/p conservative treatment of acute appendicitis.  He denies any abdominal pain.  He just completed his antibiotic course.  Review of Systems   Constitutional: Negative for fever and unexpected weight change.   Respiratory: Negative for cough, chest tightness and shortness of breath.    Cardiovascular: Negative for chest pain and palpitations.   Gastrointestinal: Negative for abdominal pain, constipation, diarrhea, nausea and vomiting.   Genitourinary: Negative for difficulty urinating and dysuria.       Objective:      Physical Exam   Constitutional: He appears well-developed and well-nourished.   Cardiovascular: Normal rate, regular rhythm and normal heart sounds.    Pulmonary/Chest: Effort normal and breath sounds normal.   Abdominal: Soft. Bowel sounds are normal. There is no tenderness.   Vitals reviewed.      Assessment:       1. Acute appendicitis with localized peritonitis        Plan:      Plan for lap appy about January.

## 2018-08-09 NOTE — LETTER
August 9, 2018        David Kim, DO  [address] [facility] [place] [address]             [facility]  [address]  [place] [address]  Phone: [phone]   Patient: Donal Serrano   MR Number: [id number]   YOB: 1990   Date of Visit: 8/9/2018       Dear Dr. Kim:    Thank you for referring Donal Serrano to me for evaluation. Attached you will find relevant portions of my assessment and plan of care.    Assessment:       1. Acute appendicitis with localized peritonitis        Plan:      Plan for lap appy about January.    If you have questions, please do not hesitate to call me. I look forward to following Donal Serrano along with you.    Sincerely,      Saulo Powell MD            CC  Katherine L. Baumgarten, MD    Enclosure

## 2018-08-13 ENCOUNTER — PATIENT MESSAGE (OUTPATIENT)
Dept: INFECTIOUS DISEASES | Facility: CLINIC | Age: 28
End: 2018-08-13

## 2018-08-20 ENCOUNTER — ANESTHESIA (OUTPATIENT)
Dept: SURGERY | Facility: HOSPITAL | Age: 28
End: 2018-08-20
Payer: COMMERCIAL

## 2018-08-20 ENCOUNTER — ANESTHESIA EVENT (OUTPATIENT)
Dept: SURGERY | Facility: HOSPITAL | Age: 28
End: 2018-08-20
Payer: COMMERCIAL

## 2018-08-20 ENCOUNTER — HOSPITAL ENCOUNTER (OUTPATIENT)
Facility: HOSPITAL | Age: 28
Discharge: HOME | End: 2018-08-21
Attending: EMERGENCY MEDICINE | Admitting: SURGERY
Payer: COMMERCIAL

## 2018-08-20 DIAGNOSIS — K35.30 ACUTE APPENDICITIS WITH LOCALIZED PERITONITIS: Primary | ICD-10-CM

## 2018-08-20 DIAGNOSIS — K37 APPENDICITIS, UNSPECIFIED APPENDICITIS TYPE: ICD-10-CM

## 2018-08-20 LAB
ALBUMIN SERPL BCP-MCNC: 4.3 G/DL
ALP SERPL-CCNC: 72 U/L
ALT SERPL W/O P-5'-P-CCNC: 58 U/L
ANION GAP SERPL CALC-SCNC: 11 MMOL/L
AST SERPL-CCNC: 30 U/L
BASOPHILS # BLD AUTO: 0.03 K/UL
BASOPHILS NFR BLD: 0.2 %
BILIRUB SERPL-MCNC: 0.9 MG/DL
BILIRUB UR QL STRIP: NEGATIVE
BUN SERPL-MCNC: 16 MG/DL
CALCIUM SERPL-MCNC: 10.1 MG/DL
CHLORIDE SERPL-SCNC: 104 MMOL/L
CLARITY UR REFRACT.AUTO: CLEAR
CO2 SERPL-SCNC: 23 MMOL/L
COLOR UR AUTO: YELLOW
CREAT SERPL-MCNC: 0.9 MG/DL
DIFFERENTIAL METHOD: ABNORMAL
EOSINOPHIL # BLD AUTO: 0.1 K/UL
EOSINOPHIL NFR BLD: 0.8 %
ERYTHROCYTE [DISTWIDTH] IN BLOOD BY AUTOMATED COUNT: 11.6 %
EST. GFR  (AFRICAN AMERICAN): >60 ML/MIN/1.73 M^2
EST. GFR  (NON AFRICAN AMERICAN): >60 ML/MIN/1.73 M^2
GLUCOSE SERPL-MCNC: 99 MG/DL
GLUCOSE UR QL STRIP: NEGATIVE
HCT VFR BLD AUTO: 46 %
HGB BLD-MCNC: 16.5 G/DL
HGB UR QL STRIP: NEGATIVE
IMM GRANULOCYTES # BLD AUTO: 0.1 K/UL
IMM GRANULOCYTES NFR BLD AUTO: 0.6 %
KETONES UR QL STRIP: NEGATIVE
LEUKOCYTE ESTERASE UR QL STRIP: NEGATIVE
LIPASE SERPL-CCNC: 23 U/L
LYMPHOCYTES # BLD AUTO: 2.1 K/UL
LYMPHOCYTES NFR BLD: 12.2 %
MCH RBC QN AUTO: 30.4 PG
MCHC RBC AUTO-ENTMCNC: 35.9 G/DL
MCV RBC AUTO: 85 FL
MONOCYTES # BLD AUTO: 1.3 K/UL
MONOCYTES NFR BLD: 7.6 %
NEUTROPHILS # BLD AUTO: 13.3 K/UL
NEUTROPHILS NFR BLD: 78.6 %
NITRITE UR QL STRIP: NEGATIVE
NRBC BLD-RTO: 0 /100 WBC
PH UR STRIP: 8 [PH] (ref 5–8)
PLATELET # BLD AUTO: 181 K/UL
PMV BLD AUTO: 9.9 FL
POTASSIUM SERPL-SCNC: 3.6 MMOL/L
PROT SERPL-MCNC: 8.2 G/DL
PROT UR QL STRIP: NEGATIVE
RBC # BLD AUTO: 5.43 M/UL
SODIUM SERPL-SCNC: 138 MMOL/L
SP GR UR STRIP: 1.01 (ref 1–1.03)
URN SPEC COLLECT METH UR: NORMAL
UROBILINOGEN UR STRIP-ACNC: NEGATIVE EU/DL
WBC # BLD AUTO: 16.91 K/UL

## 2018-08-20 PROCEDURE — S0028 INJECTION, FAMOTIDINE, 20 MG: HCPCS | Performed by: NURSE ANESTHETIST, CERTIFIED REGISTERED

## 2018-08-20 PROCEDURE — 96365 THER/PROPH/DIAG IV INF INIT: CPT

## 2018-08-20 PROCEDURE — S0030 INJECTION, METRONIDAZOLE: HCPCS | Performed by: SURGERY

## 2018-08-20 PROCEDURE — 96361 HYDRATE IV INFUSION ADD-ON: CPT

## 2018-08-20 PROCEDURE — G0378 HOSPITAL OBSERVATION PER HR: HCPCS

## 2018-08-20 PROCEDURE — 27201423 OPTIME MED/SURG SUP & DEVICES STERILE SUPPLY: Performed by: SURGERY

## 2018-08-20 PROCEDURE — 25500020 PHARM REV CODE 255: Performed by: EMERGENCY MEDICINE

## 2018-08-20 PROCEDURE — 44970 LAPAROSCOPY APPENDECTOMY: CPT | Mod: ,,, | Performed by: SURGERY

## 2018-08-20 PROCEDURE — 25000003 PHARM REV CODE 250: Performed by: SURGERY

## 2018-08-20 PROCEDURE — 88304 TISSUE EXAM BY PATHOLOGIST: CPT | Mod: 26,,, | Performed by: PATHOLOGY

## 2018-08-20 PROCEDURE — 85025 COMPLETE CBC W/AUTO DIFF WBC: CPT

## 2018-08-20 PROCEDURE — 63600175 PHARM REV CODE 636 W HCPCS: Performed by: SURGERY

## 2018-08-20 PROCEDURE — 36000709 HC OR TIME LEV III EA ADD 15 MIN: Performed by: SURGERY

## 2018-08-20 PROCEDURE — 71000033 HC RECOVERY, INTIAL HOUR: Performed by: SURGERY

## 2018-08-20 PROCEDURE — 63600175 PHARM REV CODE 636 W HCPCS: Performed by: ANESTHESIOLOGY

## 2018-08-20 PROCEDURE — 63600175 PHARM REV CODE 636 W HCPCS: Performed by: NURSE ANESTHETIST, CERTIFIED REGISTERED

## 2018-08-20 PROCEDURE — 81003 URINALYSIS AUTO W/O SCOPE: CPT

## 2018-08-20 PROCEDURE — 88304 TISSUE EXAM BY PATHOLOGIST: CPT | Performed by: PATHOLOGY

## 2018-08-20 PROCEDURE — 83690 ASSAY OF LIPASE: CPT

## 2018-08-20 PROCEDURE — D9220A PRA ANESTHESIA: Mod: ANES,,, | Performed by: ANESTHESIOLOGY

## 2018-08-20 PROCEDURE — 37000009 HC ANESTHESIA EA ADD 15 MINS: Performed by: SURGERY

## 2018-08-20 PROCEDURE — 63600175 PHARM REV CODE 636 W HCPCS

## 2018-08-20 PROCEDURE — 99285 EMERGENCY DEPT VISIT HI MDM: CPT | Mod: 25

## 2018-08-20 PROCEDURE — D9220A PRA ANESTHESIA: Mod: CRNA,,, | Performed by: NURSE ANESTHETIST, CERTIFIED REGISTERED

## 2018-08-20 PROCEDURE — 25000003 PHARM REV CODE 250: Performed by: NURSE ANESTHETIST, CERTIFIED REGISTERED

## 2018-08-20 PROCEDURE — 71000039 HC RECOVERY, EACH ADD'L HOUR: Performed by: SURGERY

## 2018-08-20 PROCEDURE — A4216 STERILE WATER/SALINE, 10 ML: HCPCS | Performed by: SURGERY

## 2018-08-20 PROCEDURE — 80053 COMPREHEN METABOLIC PANEL: CPT

## 2018-08-20 PROCEDURE — 37000008 HC ANESTHESIA 1ST 15 MINUTES: Performed by: SURGERY

## 2018-08-20 PROCEDURE — 36000708 HC OR TIME LEV III 1ST 15 MIN: Performed by: SURGERY

## 2018-08-20 PROCEDURE — 25000003 PHARM REV CODE 250: Performed by: NURSE PRACTITIONER

## 2018-08-20 PROCEDURE — 63600175 PHARM REV CODE 636 W HCPCS: Performed by: NURSE PRACTITIONER

## 2018-08-20 PROCEDURE — 96375 TX/PRO/DX INJ NEW DRUG ADDON: CPT

## 2018-08-20 PROCEDURE — 99285 EMERGENCY DEPT VISIT HI MDM: CPT | Mod: ,,, | Performed by: NURSE PRACTITIONER

## 2018-08-20 RX ORDER — DIPHENHYDRAMINE HYDROCHLORIDE 50 MG/ML
12.5 INJECTION INTRAMUSCULAR; INTRAVENOUS EVERY 4 HOURS PRN
Status: DISCONTINUED | OUTPATIENT
Start: 2018-08-20 | End: 2018-08-21 | Stop reason: HOSPADM

## 2018-08-20 RX ORDER — ACETAMINOPHEN 10 MG/ML
INJECTION, SOLUTION INTRAVENOUS
Status: COMPLETED
Start: 2018-08-20 | End: 2018-08-20

## 2018-08-20 RX ORDER — MEPERIDINE HYDROCHLORIDE 50 MG/ML
12.5 INJECTION INTRAMUSCULAR; INTRAVENOUS; SUBCUTANEOUS EVERY 10 MIN PRN
Status: DISCONTINUED | OUTPATIENT
Start: 2018-08-20 | End: 2018-08-20 | Stop reason: HOSPADM

## 2018-08-20 RX ORDER — PHENYLEPHRINE HYDROCHLORIDE 10 MG/ML
INJECTION INTRAVENOUS
Status: DISCONTINUED | OUTPATIENT
Start: 2018-08-20 | End: 2018-08-20

## 2018-08-20 RX ORDER — METRONIDAZOLE 500 MG/100ML
500 INJECTION, SOLUTION INTRAVENOUS
Status: DISCONTINUED | OUTPATIENT
Start: 2018-08-20 | End: 2018-08-21

## 2018-08-20 RX ORDER — MORPHINE SULFATE 2 MG/ML
6 INJECTION, SOLUTION INTRAMUSCULAR; INTRAVENOUS
Status: COMPLETED | OUTPATIENT
Start: 2018-08-20 | End: 2018-08-20

## 2018-08-20 RX ORDER — FENTANYL CITRATE 50 UG/ML
50 INJECTION, SOLUTION INTRAMUSCULAR; INTRAVENOUS EVERY 5 MIN PRN
Status: DISCONTINUED | OUTPATIENT
Start: 2018-08-20 | End: 2018-08-20 | Stop reason: HOSPADM

## 2018-08-20 RX ORDER — ONDANSETRON 2 MG/ML
8 INJECTION INTRAMUSCULAR; INTRAVENOUS
Status: COMPLETED | OUTPATIENT
Start: 2018-08-20 | End: 2018-08-20

## 2018-08-20 RX ORDER — MIDAZOLAM HYDROCHLORIDE 1 MG/ML
INJECTION, SOLUTION INTRAMUSCULAR; INTRAVENOUS
Status: DISCONTINUED | OUTPATIENT
Start: 2018-08-20 | End: 2018-08-20

## 2018-08-20 RX ORDER — ONDANSETRON 2 MG/ML
8 INJECTION INTRAMUSCULAR; INTRAVENOUS EVERY 8 HOURS PRN
Status: DISCONTINUED | OUTPATIENT
Start: 2018-08-20 | End: 2018-08-21 | Stop reason: HOSPADM

## 2018-08-20 RX ORDER — BUPIVACAINE HYDROCHLORIDE 2.5 MG/ML
INJECTION, SOLUTION EPIDURAL; INFILTRATION; INTRACAUDAL
Status: DISCONTINUED | OUTPATIENT
Start: 2018-08-20 | End: 2018-08-20 | Stop reason: HOSPADM

## 2018-08-20 RX ORDER — FENTANYL CITRATE 50 UG/ML
INJECTION, SOLUTION INTRAMUSCULAR; INTRAVENOUS
Status: COMPLETED
Start: 2018-08-20 | End: 2018-08-20

## 2018-08-20 RX ORDER — SODIUM CHLORIDE 0.9 % (FLUSH) 0.9 %
3 SYRINGE (ML) INJECTION
Status: DISCONTINUED | OUTPATIENT
Start: 2018-08-20 | End: 2018-08-21 | Stop reason: HOSPADM

## 2018-08-20 RX ORDER — NEOSTIGMINE METHYLSULFATE 1 MG/ML
INJECTION, SOLUTION INTRAVENOUS
Status: DISCONTINUED | OUTPATIENT
Start: 2018-08-20 | End: 2018-08-20

## 2018-08-20 RX ORDER — OXYCODONE AND ACETAMINOPHEN 5; 325 MG/1; MG/1
1 TABLET ORAL EVERY 4 HOURS PRN
Qty: 21 TABLET | Refills: 0 | Status: SHIPPED | OUTPATIENT
Start: 2018-08-20 | End: 2018-08-21

## 2018-08-20 RX ORDER — PROPOFOL 10 MG/ML
VIAL (ML) INTRAVENOUS
Status: DISCONTINUED | OUTPATIENT
Start: 2018-08-20 | End: 2018-08-20

## 2018-08-20 RX ORDER — ACETAMINOPHEN 10 MG/ML
INJECTION, SOLUTION INTRAVENOUS
Status: DISCONTINUED | OUTPATIENT
Start: 2018-08-20 | End: 2018-08-20

## 2018-08-20 RX ORDER — ONDANSETRON 2 MG/ML
4 INJECTION INTRAMUSCULAR; INTRAVENOUS EVERY 8 HOURS PRN
Status: DISCONTINUED | OUTPATIENT
Start: 2018-08-20 | End: 2018-08-20

## 2018-08-20 RX ORDER — NALOXONE HCL 0.4 MG/ML
0.02 VIAL (ML) INJECTION
Status: DISCONTINUED | OUTPATIENT
Start: 2018-08-20 | End: 2018-08-21 | Stop reason: HOSPADM

## 2018-08-20 RX ORDER — ONDANSETRON 2 MG/ML
INJECTION INTRAMUSCULAR; INTRAVENOUS
Status: DISCONTINUED | OUTPATIENT
Start: 2018-08-20 | End: 2018-08-20

## 2018-08-20 RX ORDER — SODIUM CHLORIDE 0.9 % (FLUSH) 0.9 %
3 SYRINGE (ML) INJECTION EVERY 8 HOURS
Status: DISCONTINUED | OUTPATIENT
Start: 2018-08-20 | End: 2018-08-21 | Stop reason: HOSPADM

## 2018-08-20 RX ORDER — OXYCODONE AND ACETAMINOPHEN 10; 325 MG/1; MG/1
1 TABLET ORAL EVERY 4 HOURS PRN
Status: DISCONTINUED | OUTPATIENT
Start: 2018-08-20 | End: 2018-08-21 | Stop reason: HOSPADM

## 2018-08-20 RX ORDER — OXYCODONE AND ACETAMINOPHEN 5; 325 MG/1; MG/1
1 TABLET ORAL EVERY 4 HOURS PRN
Status: DISCONTINUED | OUTPATIENT
Start: 2018-08-20 | End: 2018-08-21 | Stop reason: HOSPADM

## 2018-08-20 RX ORDER — DEXAMETHASONE SODIUM PHOSPHATE 4 MG/ML
INJECTION, SOLUTION INTRA-ARTICULAR; INTRALESIONAL; INTRAMUSCULAR; INTRAVENOUS; SOFT TISSUE
Status: DISCONTINUED | OUTPATIENT
Start: 2018-08-20 | End: 2018-08-20

## 2018-08-20 RX ORDER — SUCCINYLCHOLINE CHLORIDE 20 MG/ML
INJECTION INTRAMUSCULAR; INTRAVENOUS
Status: DISCONTINUED | OUTPATIENT
Start: 2018-08-20 | End: 2018-08-20

## 2018-08-20 RX ORDER — HEPARIN SODIUM 5000 [USP'U]/ML
5000 INJECTION, SOLUTION INTRAVENOUS; SUBCUTANEOUS EVERY 8 HOURS
Status: DISCONTINUED | OUTPATIENT
Start: 2018-08-20 | End: 2018-08-21 | Stop reason: HOSPADM

## 2018-08-20 RX ORDER — HYDROMORPHONE HYDROCHLORIDE 1 MG/ML
0.5 INJECTION, SOLUTION INTRAMUSCULAR; INTRAVENOUS; SUBCUTANEOUS EVERY 4 HOURS PRN
Status: DISPENSED | OUTPATIENT
Start: 2018-08-21 | End: 2018-08-21

## 2018-08-20 RX ORDER — FAMOTIDINE 10 MG/ML
INJECTION INTRAVENOUS
Status: DISCONTINUED | OUTPATIENT
Start: 2018-08-20 | End: 2018-08-20

## 2018-08-20 RX ORDER — LIDOCAINE HCL/PF 100 MG/5ML
SYRINGE (ML) INTRAVENOUS
Status: DISCONTINUED | OUTPATIENT
Start: 2018-08-20 | End: 2018-08-20

## 2018-08-20 RX ORDER — ROCURONIUM BROMIDE 10 MG/ML
INJECTION, SOLUTION INTRAVENOUS
Status: DISCONTINUED | OUTPATIENT
Start: 2018-08-20 | End: 2018-08-20

## 2018-08-20 RX ORDER — FENTANYL CITRATE 50 UG/ML
INJECTION, SOLUTION INTRAMUSCULAR; INTRAVENOUS
Status: DISCONTINUED | OUTPATIENT
Start: 2018-08-20 | End: 2018-08-20

## 2018-08-20 RX ORDER — GLYCOPYRROLATE 0.2 MG/ML
INJECTION INTRAMUSCULAR; INTRAVENOUS
Status: DISCONTINUED | OUTPATIENT
Start: 2018-08-20 | End: 2018-08-20

## 2018-08-20 RX ADMIN — SUGAMMADEX 300 MG: 100 INJECTION, SOLUTION INTRAVENOUS at 08:08

## 2018-08-20 RX ADMIN — ONDANSETRON 4 MG: 2 INJECTION INTRAMUSCULAR; INTRAVENOUS at 10:08

## 2018-08-20 RX ADMIN — PHENYLEPHRINE HYDROCHLORIDE 200 MCG: 10 INJECTION INTRAVENOUS at 08:08

## 2018-08-20 RX ADMIN — GLYCOPYRROLATE 0.4 MG: 0.2 INJECTION, SOLUTION INTRAMUSCULAR; INTRAVENOUS at 08:08

## 2018-08-20 RX ADMIN — METRONIDAZOLE 500 MG: 500 INJECTION, SOLUTION INTRAVENOUS at 11:08

## 2018-08-20 RX ADMIN — CEFTRIAXONE SODIUM 2 G: 2 INJECTION, POWDER, FOR SOLUTION INTRAMUSCULAR; INTRAVENOUS at 09:08

## 2018-08-20 RX ADMIN — FAMOTIDINE 20 MG: 10 INJECTION, SOLUTION INTRAVENOUS at 08:08

## 2018-08-20 RX ADMIN — PIPERACILLIN AND TAZOBACTAM 4.5 G: 4; .5 INJECTION, POWDER, LYOPHILIZED, FOR SOLUTION INTRAVENOUS; PARENTERAL at 05:08

## 2018-08-20 RX ADMIN — ROCURONIUM BROMIDE 20 MG: 10 INJECTION, SOLUTION INTRAVENOUS at 08:08

## 2018-08-20 RX ADMIN — HEPARIN SODIUM 5000 UNITS: 5000 INJECTION, SOLUTION INTRAVENOUS; SUBCUTANEOUS at 09:08

## 2018-08-20 RX ADMIN — FENTANYL CITRATE 50 MCG: 50 INJECTION INTRAMUSCULAR; INTRAVENOUS at 09:08

## 2018-08-20 RX ADMIN — ACETAMINOPHEN 1000 MG: 10 INJECTION, SOLUTION INTRAVENOUS at 08:08

## 2018-08-20 RX ADMIN — FENTANYL CITRATE 50 MCG: 50 INJECTION INTRAMUSCULAR; INTRAVENOUS at 10:08

## 2018-08-20 RX ADMIN — ROCURONIUM BROMIDE 10 MG: 10 INJECTION, SOLUTION INTRAVENOUS at 08:08

## 2018-08-20 RX ADMIN — LIDOCAINE HYDROCHLORIDE 100 MG: 20 INJECTION, SOLUTION INTRAVENOUS at 08:08

## 2018-08-20 RX ADMIN — FENTANYL CITRATE 50 MCG: 50 INJECTION, SOLUTION INTRAMUSCULAR; INTRAVENOUS at 08:08

## 2018-08-20 RX ADMIN — DEXAMETHASONE SODIUM PHOSPHATE 8 MG: 4 INJECTION, SOLUTION INTRAMUSCULAR; INTRAVENOUS at 08:08

## 2018-08-20 RX ADMIN — SODIUM CHLORIDE 1000 ML: 0.9 INJECTION, SOLUTION INTRAVENOUS at 03:08

## 2018-08-20 RX ADMIN — Medication 3 ML: at 09:08

## 2018-08-20 RX ADMIN — Medication 6 MG: at 03:08

## 2018-08-20 RX ADMIN — FENTANYL CITRATE 100 MCG: 50 INJECTION, SOLUTION INTRAMUSCULAR; INTRAVENOUS at 08:08

## 2018-08-20 RX ADMIN — SUCCINYLCHOLINE CHLORIDE 120 MG: 20 INJECTION, SOLUTION INTRAMUSCULAR; INTRAVENOUS at 08:08

## 2018-08-20 RX ADMIN — MIDAZOLAM HYDROCHLORIDE 2 MG: 1 INJECTION, SOLUTION INTRAMUSCULAR; INTRAVENOUS at 07:08

## 2018-08-20 RX ADMIN — IOHEXOL 75 ML: 350 INJECTION, SOLUTION INTRAVENOUS at 04:08

## 2018-08-20 RX ADMIN — Medication 0.5 MG: at 11:08

## 2018-08-20 RX ADMIN — PROPOFOL 200 MG: 10 INJECTION, EMULSION INTRAVENOUS at 08:08

## 2018-08-20 RX ADMIN — ONDANSETRON 4 MG: 2 INJECTION INTRAMUSCULAR; INTRAVENOUS at 08:08

## 2018-08-20 RX ADMIN — NEOSTIGMINE METHYLSULFATE 5 MG: 1 INJECTION INTRAVENOUS at 08:08

## 2018-08-20 RX ADMIN — SODIUM CHLORIDE, SODIUM GLUCONATE, SODIUM ACETATE, POTASSIUM CHLORIDE, MAGNESIUM CHLORIDE, SODIUM PHOSPHATE, DIBASIC, AND POTASSIUM PHOSPHATE: .53; .5; .37; .037; .03; .012; .00082 INJECTION, SOLUTION INTRAVENOUS at 08:08

## 2018-08-20 RX ADMIN — ONDANSETRON 8 MG: 2 INJECTION INTRAMUSCULAR; INTRAVENOUS at 03:08

## 2018-08-20 RX ADMIN — GLYCOPYRROLATE 0.2 MG: 0.2 INJECTION, SOLUTION INTRAMUSCULAR; INTRAVENOUS at 08:08

## 2018-08-20 NOTE — ED TRIAGE NOTES
Pt c\o Lower right abd pain 8/10. Pt recently dx with appendicitis  one month ago treated with antibiotics. Pt c\o bloating and pain starting 0500 today. Pt received gas ex at home 0800 with no relief. Pt c\o nausea. Last BM today normal

## 2018-08-20 NOTE — ANESTHESIA PREPROCEDURE EVALUATION
08/20/2018  [facility]  Anesthesia Pre-Operative Evaluation         Patient Name: Donal Serrano  YOB: 1990  MRN: [id number]    SUBJECTIVE:     Pre-operative evaluation for Procedure(s) (LRB):  APPENDECTOMY, LAPAROSCOPIC (N/A)     08/20/2018    Donal Serrano is a 28 y.o. male w/ a PMhx of HTN and nephrolithiasis who presents with abdominal pain and was found to have worsening of his known acute appendicitis which had been conservatively managed prior with oral antibiotics and scheduled for outpatient appendectomy in Jan 2019. He is now scheduled for above procedure.     Last meal was at approximately 9 AM this morning.       LDA:   Peripheral IV - Single Lumen 08/20/18 1310   IV Properties Placement Date/Time: 08/20/18 1310 Present Prior to Hospital Arrival?: No Size/Length: 20 G         Prev airway: None documented.     Patient Active Problem List   Diagnosis    Acute appendicitis    Essential hypertension    Appendicitis       Review of patient's allergies indicates:  No Known Allergies    Current Inpatient Medications:   heparin (porcine)  5,000 Units Subcutaneous Q8H    sodium chloride 0.9%  3 mL Intravenous Q8H       No current facility-administered medications on file prior to encounter.      Current Outpatient Medications on File Prior to Encounter   Medication Sig Dispense Refill    losartan-hydrochlorothiazide 50-12.5 mg (HYZAAR) 50-12.5 mg per tablet Take 1 tablet by mouth once daily. 30 tablet 0    diltiazem HCl (DILTIAZEM 2% CREAM) Apply topically 3 (three) times daily. Apply topically to anal area. 50 g 5    diltiazem HCl (DILTIAZEM 2% CREAM) APPLY TOPICALLY THREE TIMES DAILY TO ANAL AREA  5    lidocaine (XYLOCAINE) 5 % Oint ointment Apply topically once daily. 37.5 g 5       Past Surgical History:   Procedure Laterality Date     CHOLECYSTECTOMY  03/2012       Social History     Socioeconomic History    Marital status: Single     Spouse name: Not on file    Number of children: Not on file    Years of education: Not on file    Highest education level: Not on file   Social Needs    Financial resource strain: Not on file    Food insecurity - worry: Not on file    Food insecurity - inability: Not on file    Transportation needs - medical: Not on file    Transportation needs - non-medical: Not on file   Occupational History    Occupation:    Tobacco Use    Smoking status: Never Smoker    Smokeless tobacco: Never Used   Substance and Sexual Activity    Alcohol use: No    Drug use: No    Sexual activity: Not Currently   Other Topics Concern    Not on file   Social History Narrative    Not on file       OBJECTIVE:     Vital Signs Range (Last 24H):  Temp:  [36.7 °C (98.1 °F)-37 °C (98.6 °F)]   Pulse:  [77-86]   Resp:  [18-20]   BP: (120-145)/(70-93)   SpO2:  [99 %-100 %]       CBC:   Recent Labs      08/20/18   1353   WBC  16.91*   RBC  5.43   HGB  16.5   HCT  46.0   PLT  181   MCV  85   MCH  30.4   MCHC  35.9       CMP:   Recent Labs      08/20/18   1353   NA  138   K  3.6   CL  104   CO2  23   BUN  16   CREATININE  0.9   GLU  99   CALCIUM  10.1   ALBUMIN  4.3   PROT  8.2   ALKPHOS  72   ALT  58*   AST  30   BILITOT  0.9       INR:  No results for input(s): PT, INR, PROTIME, APTT in the last 72 hours.    EKG: No recent studies available.    2D ECHO:  No results found for this or any previous visit.      ASSESSMENT/PLAN:         Anesthesia Evaluation    I have reviewed the Patient Summary Reports.        Review of Systems  Anesthesia Hx:  Neg history of prior surgery. Denies Family Hx of Anesthesia complications.   Denies Personal Hx of Anesthesia complications.   Hematology/Oncology:  Hematology Normal   Oncology Normal     EENT/Dental:EENT/Dental Normal   Cardiovascular:   Hypertension    Pulmonary:  Pulmonary Normal     Renal/:   Chronic Renal Disease renal calculi    Hepatic/GI:   Acute appendicitis    Musculoskeletal:  Musculoskeletal Normal    Neurological:  Neurology Normal    Endocrine:  Endocrine Normal        Physical Exam  General:  Well nourished    Airway/Jaw/Neck:  Airway Findings: Mouth Opening: Normal Tongue: Normal  General Airway Assessment: Adult  Mallampati: II  Improves to I with phonation.     Eyes/Ears/Nose:  EYES/EARS/NOSE FINDINGS: Normal   Dental:  Dental Findings: In tact   Chest/Lungs:  Chest/Lungs Findings: Clear to auscultation     Heart/Vascular:  Heart Findings: Rate: Normal        Mental Status:  Mental Status Findings:  Cooperative, Alert and Oriented         Anesthesia Plan  Type of Anesthesia, risks & benefits discussed:  Anesthesia Type:  general  Patient's Preference:   Intra-op Monitoring Plan: standard ASA monitors  Intra-op Monitoring Plan Comments:   Post Op Pain Control Plan:   Post Op Pain Control Plan Comments:   Induction:   IV  Beta Blocker:  Patient is not currently on a Beta-Blocker (No further documentation required).       Informed Consent: Patient understands risks and agrees with Anesthesia plan.  Questions answered. Anesthesia consent signed with patient.  ASA Score: 2  emergent   Day of Surgery Review of History & Physical: I have interviewed and examined the patient. I have reviewed the patient's H&P dated:    H&P update referred to the surgeon.         Ready For Surgery From Anesthesia Perspective.

## 2018-08-20 NOTE — ED PROVIDER NOTES
Encounter Date: 8/20/2018    SCRIBE #1 NOTE: I, Amira Roberts, am scribing for, and in the presence of,  Dr. Loving. I have scribed the following portions of the note - the APC attestation.       History     Chief Complaint   Patient presents with    Abdominal Pain     last time had inflammed appendix and antibiotics helped, now feel the same way     HPI   This is a 28-year-old male with a past medical history significant for acute appendicitis which is being conservatively treated and pt is scheduled for an outpatient appendectomy in January 2019. Also hx of hypertension and kidney stones who presents to the emergency department today for evaluation of abdominal pain.  Patient initially experienced abdominal pain about a month and a half ago when he was on a safari in [facility].  He states the pain began as a generalized pain at that eventually localized to the right lower quadrant of the abdomen.  Patient left [facility] and returned home to [place] because of the abdominal pain and he has been followed here at [facility].  He was diagnosed with appendicitis and has been managed conservatively with oral antibiotics.  He completed his antibiotics a couple of weeks ago and reports that the pain had subsided.  It returned this morning.  He states that the pain feels the same as it did previously.  He reports a generalized constant sharp pain that localizes in the right lower quadrant of the abdomen.  He denies nausea, vomiting or diarrhea.  Denies fever or chills.  He has been followed by Dr. Rooney and Dr. Powell here for these concerns.  Denies any other problems or concerns at this time.    Review of patient's allergies indicates:  No Known Allergies  Past Medical History:   Diagnosis Date    Hypertension     Keloid cicatrix     Kidney stones      Past Surgical History:   Procedure Laterality Date    CHOLECYSTECTOMY  03/2012     Family History   Problem Relation Age of Onset    Skin cancer Maternal  Grandfather     Melanoma Maternal Grandfather     Hypertension Mother     Cancer Father         Liver    Cirrhosis Father     Skin cancer Paternal Grandfather     Heart disease Maternal Uncle     Stroke Neg Hx      Social History     Tobacco Use    Smoking status: Never Smoker    Smokeless tobacco: Never Used   Substance Use Topics    Alcohol use: No    Drug use: No     Review of Systems   Constitutional: Negative for chills and fever.   HENT: Negative for congestion and sinus pressure.    Eyes: Negative for visual disturbance.   Respiratory: Negative for cough, chest tightness and shortness of breath.    Cardiovascular: Negative for chest pain.   Gastrointestinal:  Positive for abdominal pain.  Negative for nausea, vomiting or diarrhea.  Genitourinary: Negative for flank pain. Negative for dysuria.  Musculoskeletal: Negative for arthralgias and myalgias.   Skin: Negative for rash and wound.   Neurological: Negative for dizziness. Negative for weakness and numbness.   Psychiatric/Behavioral: Negative for confusion.         Physical Exam     Initial Vitals [08/20/18 1238]   BP Pulse Resp Temp SpO2   (!) 145/93 77 18 98.1 °F (36.7 °C) 99 %      MAP       --         Physical Exam   Nursing note and vitals reviewed.  Constitutional: Patient appears well-developed and well-nourished. Not diaphoretic. No distress.   HENT:   Head: Normocephalic and atraumatic.   Eyes: Conjunctivae are normal. No scleral icterus.   Neck: Normal range of motion. Neck supple.   Cardiovascular: Normal rate, regular rhythm and normal heart sounds.   Pulmonary/Chest: Breath sounds normal. No respiratory distress.  Abdominal:  The abdomen is soft and nondistended. Normal bowel sounds.  Patient is tender with generalized abdominal palpation, worse in the right lower quadrant of the abdomen.  Positive for rebound and guarding.  Musculoskeletal: Normal range of motion. No edema or tenderness.   Neurological: Alert and oriented to person,  place, and time. Normal strength. No sensory deficit.   Skin: Skin is warm and dry. No rash noted. No erythema. No pallor.   Psychiatric: Normal mood and affect. Thought content normal.       ED Course   Procedures  Labs Reviewed   CBC W/ AUTO DIFFERENTIAL - Abnormal; Notable for the following components:       Result Value    WBC 16.91 (*)     Immature Granulocytes 0.6 (*)     Gran # (ANC) 13.3 (*)     Immature Grans (Abs) 0.10 (*)     Mono # 1.3 (*)     Gran% 78.6 (*)     Lymph% 12.2 (*)     All other components within normal limits   COMPREHENSIVE METABOLIC PANEL - Abnormal; Notable for the following components:    ALT 58 (*)     All other components within normal limits   LIPASE   URINALYSIS, REFLEX TO URINE CULTURE    Narrative:     Preferred Collection Type->Urine, Clean Catch          Imaging Results          CT Abdomen Pelvis With Contrast (Final result)     Abnormal  Result time 08/20/18 16:43:23    Final result by Leopoldo Quintana III, MD (08/20/18 16:43:23)                 Impression:      See above    Findings are now suspicious for acute appendicitis.  No perforation or complication seen.    This report was flagged in Epic as abnormal.      Electronically signed by: Leopoldo Quintana MD  Date:    08/20/2018  Time:    16:43             Narrative:    EXAMINATION:  CT ABDOMEN PELVIS WITH CONTRAST    CLINICAL HISTORY:  RLQ pain, appendicitis suspected;    FINDINGS:  Comparison is 07/19/2018.  The patient was administered 75 cc of Omnipaque 350.    Lung bases are clear.  Liver, spleen, stomach, pancreas, adrenal glands show nothing unusual.  Kidneys enhance normally.  No biliary tree abnormalities are seen.  Appendix has changed now dilated with fluid inside it and some stranding around it with adjacent lymph nodes.  Bones show nothing unusual.  Pelvic organs are unremarkable.  There are small lymph nodes near the terminal ileum.                                 Medical Decision Making:   History:   Old Medical  Records: I decided to obtain old medical records.  Clinical Tests:   Lab Tests: Ordered and Reviewed  Radiological Study: Ordered and Reviewed  ED Management:  28 y.o. Male presenting for a 1 day hx of abdominal pain. He is being followed outpatient by Dr. Powell for conservative treatment of appendicitis with abx after initially being diagnosed 1.5 months ago, now with worse pain. CT scan today concerning for acute appendicitis. WBC 16.9. He remains afebrile and non toxic. General surgery will admit for appendectomy. Zosyn IV started in ED.            Scribe Attestation:   Scribe #1: I performed the above scribed service and the documentation accurately describes the services I performed. I attest to the accuracy of the note.    Attending Attestation:     Physician Attestation Statement for NP/PA:   I discussed this assessment and plan of this patient with the NP/PA, but I did not personally examine the patient. The face to face encounter was performed by the NP/PA.                     Clinical Impression:   The primary encounter diagnosis was Acute appendicitis with localized peritonitis. A diagnosis of Appendicitis, unspecified appendicitis type was also pertinent to this visit.                  Scribe attest           Ana Titus NP  08/21/18 1953

## 2018-08-20 NOTE — CONSULTS
28 year old s/p conservative treatment for appendicitis with increased rlq pain.    Past Medical History:   Diagnosis Date    Hypertension     Keloid cicatrix     Kidney stones        Past Surgical History:   Procedure Laterality Date    CHOLECYSTECTOMY  03/2012       Family History   Problem Relation Age of Onset    Skin cancer Maternal Grandfather     Melanoma Maternal Grandfather     Hypertension Mother     Cancer Father         Liver    Cirrhosis Father     Skin cancer Paternal Grandfather     Heart disease Maternal Uncle     Stroke Neg Hx        Social History     Socioeconomic History    Marital status: Single     Spouse name: None    Number of children: None    Years of education: None    Highest education level: None   Social Needs    Financial resource strain: None    Food insecurity - worry: None    Food insecurity - inability: None    Transportation needs - medical: None    Transportation needs - non-medical: None   Occupational History    Occupation:    Tobacco Use    Smoking status: Never Smoker    Smokeless tobacco: Never Used   Substance and Sexual Activity    Alcohol use: No    Drug use: No    Sexual activity: Not Currently   Other Topics Concern    None   Social History Narrative    None       Current Facility-Administered Medications   Medication Dose Route Frequency Provider Last Rate Last Dose    piperacillin-tazobactam 4.5 g in sodium chloride 0.9% 100 mL IVPB (ready to mix system)  4.5 g Intravenous ED 1 Time Ana Titus NP         Current Outpatient Medications   Medication Sig Dispense Refill    losartan-hydrochlorothiazide 50-12.5 mg (HYZAAR) 50-12.5 mg per tablet Take 1 tablet by mouth once daily. 30 tablet 0    diltiazem HCl (DILTIAZEM 2% CREAM) Apply topically 3 (three) times daily. Apply topically to anal area. 50 g 5    diltiazem HCl (DILTIAZEM 2% CREAM) APPLY TOPICALLY THREE TIMES DAILY TO ANAL AREA  5    lidocaine (XYLOCAINE) 5 % Oint  ointment Apply topically once daily. 37.5 g 5       Review of patient's allergies indicates:  No Known Allergies    PE: vs reviewed  Abdomen: rlq pain    Labs and ct reviewed.    Impression: acute on chronic appendicitis    Plan: lap possible open emanuel

## 2018-08-21 VITALS
BODY MASS INDEX: 25.11 KG/M2 | HEART RATE: 72 BPM | TEMPERATURE: 96 F | DIASTOLIC BLOOD PRESSURE: 74 MMHG | OXYGEN SATURATION: 97 % | WEIGHT: 160 LBS | HEIGHT: 67 IN | SYSTOLIC BLOOD PRESSURE: 118 MMHG | RESPIRATION RATE: 17 BRPM

## 2018-08-21 PROBLEM — K35.80 ACUTE APPENDICITIS: Status: RESOLVED | Noted: 2018-07-05 | Resolved: 2018-08-21

## 2018-08-21 LAB
ANION GAP SERPL CALC-SCNC: 8 MMOL/L
BASOPHILS # BLD AUTO: 0.01 K/UL
BASOPHILS NFR BLD: 0.1 %
BUN SERPL-MCNC: 12 MG/DL
CALCIUM SERPL-MCNC: 9 MG/DL
CHLORIDE SERPL-SCNC: 105 MMOL/L
CO2 SERPL-SCNC: 25 MMOL/L
CREAT SERPL-MCNC: 1 MG/DL
DIFFERENTIAL METHOD: ABNORMAL
EOSINOPHIL # BLD AUTO: 0 K/UL
EOSINOPHIL NFR BLD: 0 %
ERYTHROCYTE [DISTWIDTH] IN BLOOD BY AUTOMATED COUNT: 11.9 %
EST. GFR  (AFRICAN AMERICAN): >60 ML/MIN/1.73 M^2
EST. GFR  (NON AFRICAN AMERICAN): >60 ML/MIN/1.73 M^2
GLUCOSE SERPL-MCNC: 142 MG/DL
HCT VFR BLD AUTO: 41.9 %
HGB BLD-MCNC: 14.3 G/DL
IMM GRANULOCYTES # BLD AUTO: 0.07 K/UL
IMM GRANULOCYTES NFR BLD AUTO: 0.6 %
LYMPHOCYTES # BLD AUTO: 0.7 K/UL
LYMPHOCYTES NFR BLD: 6.6 %
MAGNESIUM SERPL-MCNC: 2.4 MG/DL
MCH RBC QN AUTO: 29.4 PG
MCHC RBC AUTO-ENTMCNC: 34.1 G/DL
MCV RBC AUTO: 86 FL
MONOCYTES # BLD AUTO: 0.2 K/UL
MONOCYTES NFR BLD: 2.1 %
NEUTROPHILS # BLD AUTO: 10.1 K/UL
NEUTROPHILS NFR BLD: 90.6 %
NRBC BLD-RTO: 0 /100 WBC
PHOSPHATE SERPL-MCNC: 3.2 MG/DL
PLATELET # BLD AUTO: 178 K/UL
PMV BLD AUTO: 9.9 FL
POTASSIUM SERPL-SCNC: 4.1 MMOL/L
RBC # BLD AUTO: 4.87 M/UL
SODIUM SERPL-SCNC: 138 MMOL/L
WBC # BLD AUTO: 11.17 K/UL

## 2018-08-21 PROCEDURE — 63600175 PHARM REV CODE 636 W HCPCS: Performed by: SURGERY

## 2018-08-21 PROCEDURE — 84100 ASSAY OF PHOSPHORUS: CPT

## 2018-08-21 PROCEDURE — 80048 BASIC METABOLIC PNL TOTAL CA: CPT

## 2018-08-21 PROCEDURE — 36415 COLL VENOUS BLD VENIPUNCTURE: CPT

## 2018-08-21 PROCEDURE — A4216 STERILE WATER/SALINE, 10 ML: HCPCS | Performed by: SURGERY

## 2018-08-21 PROCEDURE — 83735 ASSAY OF MAGNESIUM: CPT

## 2018-08-21 PROCEDURE — 63600175 PHARM REV CODE 636 W HCPCS: Performed by: STUDENT IN AN ORGANIZED HEALTH CARE EDUCATION/TRAINING PROGRAM

## 2018-08-21 PROCEDURE — 85025 COMPLETE CBC W/AUTO DIFF WBC: CPT

## 2018-08-21 PROCEDURE — 25000003 PHARM REV CODE 250: Performed by: SURGERY

## 2018-08-21 PROCEDURE — G0378 HOSPITAL OBSERVATION PER HR: HCPCS

## 2018-08-21 PROCEDURE — S0030 INJECTION, METRONIDAZOLE: HCPCS | Performed by: SURGERY

## 2018-08-21 RX ORDER — OXYCODONE AND ACETAMINOPHEN 5; 325 MG/1; MG/1
1 TABLET ORAL EVERY 4 HOURS PRN
Qty: 21 TABLET | Refills: 0 | Status: SHIPPED | OUTPATIENT
Start: 2018-08-21 | End: 2018-08-31

## 2018-08-21 RX ADMIN — OXYCODONE HYDROCHLORIDE AND ACETAMINOPHEN 1 TABLET: 10; 325 TABLET ORAL at 04:08

## 2018-08-21 RX ADMIN — OXYCODONE HYDROCHLORIDE AND ACETAMINOPHEN 1 TABLET: 10; 325 TABLET ORAL at 11:08

## 2018-08-21 RX ADMIN — METRONIDAZOLE 500 MG: 500 INJECTION, SOLUTION INTRAVENOUS at 06:08

## 2018-08-21 RX ADMIN — ONDANSETRON 8 MG: 2 INJECTION INTRAMUSCULAR; INTRAVENOUS at 09:08

## 2018-08-21 RX ADMIN — HEPARIN SODIUM 5000 UNITS: 5000 INJECTION, SOLUTION INTRAVENOUS; SUBCUTANEOUS at 06:08

## 2018-08-21 RX ADMIN — Medication 3 ML: at 06:08

## 2018-08-21 RX ADMIN — Medication 0.5 MG: at 06:08

## 2018-08-21 NOTE — OP NOTE
DATE OF PROCEDURE: 8/20/2018    PREOPERATIVE DIAGNOSIS: Acute appendicitis    POSTOPERATIVE DIAGNOSIS: Acute nonperforated appendicitis    PROCEDURES PERFORMED:  1. Laparoscopic appendectomy    ATTENDING SURGEON: Dr Saulo Powell    RESIDENT: Dr Viridiana Montalvo    ANESTHESIA: General    KEY FINDINGS: acute nonperforated appendicitis    ESTIMATED BLOOD LOSS: 5 ml    DRAINS: None    COMPLICATIONS: None    INDICATIONS FOR PROCEDURE: The patient is a 28 y.o. male who presented with recurrent acute appendicitis. He had previously been treated nonoperatively for appendicitis approximately 7 weeks prior to presentation, and returned to the emergency department with leukocytosis and a dilated appendix on CT scan. We recommended proceeding with an appendectomy and obtained informed conset.    PROCEDURE IN DETAIL: After informed consent was obtained, the patient was brought to the Operative Theater and placed in in the supine position. After adequate anesthesia the patient was prepped and draped in the usual sterile fashion. A timeout procedure was performed and an incision was made through the patient's umbilicus. We then elevated the fascia and came through the fascia sharply. We then entered the abdomen with the OptiView trocar and insufflated. We placed two additional 5mm ports in the lower midline under direct vision. We located the appendix, which did appear dilated but had not perforated. We took the mesoappendix with monopolar and bipolar cautery and had good hemostasis. We had then dissected to the base of the appendix. We ligated the appendix with 3 PDS EndoLoops, with 2 at the base of the appendix and one slightly distal. We then divided the appendix sharply, placed it in an EndoCatch bag and removed the appendix from the abdomen. We irrigated a very small amount by the cecum. Excellent hemostasis was achieved. The 5 mm ports were removed under direct vision and the insufflation was allowed to escape. The  umbilical fascia was closed with a 0 Vicryl figure of eight. The incisions were closed with 4-0 monocryl. A sterile dressing was applied. The patient tolerated the procedure well and was transported to the recovery room in stable condition. Dr Powell was present and scrubbed for the procedure.

## 2018-08-21 NOTE — PLAN OF CARE
08/21/18 1427   Final Note   Assessment Type Final Discharge Note   Discharge Disposition Home   What phone number can be called within the next 1-3 days to see how you are doing after discharge? ([phone])   Hospital Follow Up  Appt(s) scheduled? Yes   Discharge plans and expectations educations in teach back method with documentation complete? Yes   Right Care Referral Info   Post Acute Recommendation (Incomplete)

## 2018-08-21 NOTE — ANESTHESIA POSTPROCEDURE EVALUATION
"Anesthesia Post Evaluation    Patient: Donal Serrano    Procedure(s) Performed: Procedure(s) (LRB):  APPENDECTOMY, LAPAROSCOPIC (N/A)    Final Anesthesia Type: general  Patient location during evaluation: PACU  Patient participation: Yes- Able to Participate  Level of consciousness: awake and alert and oriented  Post-procedure vital signs: reviewed and stable  Pain management: adequate  Airway patency: patent  PONV status at discharge: No PONV  Anesthetic complications: no      Cardiovascular status: stable  Respiratory status: unassisted, spontaneous ventilation and room air  Hydration status: euvolemic  Follow-up not needed.        Visit Vitals  BP (!) 113/55   Pulse 82   Temp 36.9 °C (98.4 °F) (Temporal)   Resp 12   Ht 5' 7" (1.702 m)   Wt 72.6 kg (160 lb)   SpO2 98%   BMI 25.06 kg/m²       Pain/Kartik Score: Pain Assessment Performed: Yes (8/20/2018 10:30 PM)  Presence of Pain: other (see comments) (8/20/2018 10:30 PM)  Pain Rating Prior to Med Admin: 4 (8/20/2018 10:13 PM)  Pain Rating Post Med Admin: 4 (8/20/2018 10:30 PM)  Kartik Score: 9 (8/20/2018 10:30 PM)        "

## 2018-08-21 NOTE — PLAN OF CARE
Patient lives alone in a 1 story house. Mother is at BS. Independent & agile prior to this admit. No needs determined.     [facility] Packet given to patient after informed about it;patient verbalized their understanding.        08/21/18 1320   Discharge Assessment   Assessment Type Discharge Planning Assessment   Confirmed/corrected address and phone number on facesheet? Yes   Assessment information obtained from? Patient;Medical Record   Expected Length of Stay (days) (1)   Communicated expected length of stay with patient/caregiver yes   Prior to hospitilization cognitive status: Alert/Oriented;No Deficits   Prior to hospitalization functional status: Independent   Current cognitive status: Alert/Oriented;No Deficits   Current Functional Status: Independent   Facility Arrived From: (N/A)   Lives With alone   Able to Return to Prior Arrangements yes   Is patient able to care for self after discharge? Yes   Who are your caregiver(s) and their phone number(s)? (Citlali Serrano Mother [phone]   )   Patient's perception of discharge disposition home or selfcare  (Discharging to Parents 1 story home: [address], [place] [address])   Readmission Within The Last 30 Days no previous admission in last 30 days   Patient currently being followed by outpatient case management? No   Patient currently receives any other outside agency services? No   Equipment Currently Used at Home other (see comments)  (B/p cuff. Takes B/p daily. )   Do you have any problems affording any of your prescribed medications? No   Is the patient taking medications as prescribed? yes   Does the patient have transportation home? Yes   Transportation Available car;family or friend will provide   Dialysis Name and Scheduled days (N/A)   Does the patient receive services at the [facility]? No   Discharge Plan A Other  (Parents home)   Discharge Plan B Other  (Parents home. )   Patient/Family In Agreement With Plan yes

## 2018-08-21 NOTE — PROGRESS NOTES
Pt discharged home with family.  Discharge instructions and script given.  Pt stated understanding of instructions. PIV removed.

## 2018-08-21 NOTE — BRIEF OP NOTE
[facility]  Brief Operative Note    SUMMARY     Surgery Date: 8/20/2018     Surgeon(s) and Role:     * Saulo Powell MD - Primary     * Viridiana Montalvo MD - Resident - Assisting        Pre-op Diagnosis:  Acute appendicitis with localized peritonitis [K35.3]    Post-op Diagnosis:  Post-Op Diagnosis Codes:     * Acute appendicitis with localized peritonitis [K35.3]    Procedure(s) (LRB):  APPENDECTOMY, LAPAROSCOPIC (N/A)    Anesthesia: General    Description of Procedure: acute nonperforated appendicitis, appendix ligated with PDS endoloop x3 and removed    Description of the findings of the procedure: see op note    Estimated Blood Loss: * No values recorded between 8/20/2018  8:22 PM and 8/20/2018  9:00 PM *         Specimens:   Specimen (12h ago, onward)    Start     Ordered    08/20/18 2046  Specimen to Pathology - Surgery  Once     Comments:  1. Appendix - PERMANENT, no formalin, to fridge.     Start Status   08/20/18 2046 Collected (08/20/18 2041)       08/20/18 2046

## 2018-08-21 NOTE — TRANSFER OF CARE
"Anesthesia Transfer of Care Note    Patient: Donal Serrano    Procedure(s) Performed: Procedure(s) (LRB):  APPENDECTOMY, LAPAROSCOPIC (N/A)    Patient location: PACU    Anesthesia Type: general    Transport from OR: Transported from OR on room air with adequate spontaneous ventilation    Post pain: adequate analgesia    Post assessment: no apparent anesthetic complications and tolerated procedure well    Post vital signs: stable    Level of consciousness: awake    Nausea/Vomiting: no nausea/vomiting    Complications: none    Transfer of care protocol was followed      Last vitals:   Visit Vitals  /84 (BP Location: Right arm, Patient Position: Lying)   Pulse 110   Temp 36.5 °C (97.7 °F) (Temporal)   Resp 20   Ht 5' 7" (1.702 m)   Wt 72.6 kg (160 lb)   SpO2 97%   BMI 25.06 kg/m²     "

## 2018-08-21 NOTE — ASSESSMENT & PLAN NOTE
1 Day Post-Op s/p lap appy    - Advance diet to regular  - PO pain meds  - PRN nausea meds  - OOBTC, ambulate  - Home today if tolerating regular diet

## 2018-08-21 NOTE — PROGRESS NOTES
[facility]  General Surgery  Progress Note    Subjective:     History of Present Illness:  No notes on file    Post-Op Info:  Procedure(s) (LRB):  APPENDECTOMY, LAPAROSCOPIC (N/A)   1 Day Post-Op     Interval History:   NAEON. Tolerating clears. Pain controlled.    Medications:  Continuous Infusions:  Scheduled Meds:   heparin (porcine)  5,000 Units Subcutaneous Q8H    metronidazole  500 mg Intravenous Q8H    sodium chloride 0.9%  3 mL Intravenous Q8H    sugammadex         PRN Meds:diphenhydrAMINE, naloxone, ondansetron, oxyCODONE-acetaminophen, oxyCODONE-acetaminophen, promethazine (PHENERGAN) IVPB, sodium chloride 0.9%     Review of patient's allergies indicates:  No Known Allergies  Objective:     Vital Signs (Most Recent):  Temp: 96.9 °F (36.1 °C) (08/21/18 0746)  Pulse: 79 (08/21/18 0746)  Resp: 17 (08/21/18 0746)  BP: 123/62 (08/21/18 0746)  SpO2: (!) 93 % (08/21/18 0746) Vital Signs (24h Range):  Temp:  [96.9 °F (36.1 °C)-99 °F (37.2 °C)] 96.9 °F (36.1 °C)  Pulse:  [] 79  Resp:  [11-20] 17  SpO2:  [93 %-100 %] 93 %  BP: (113-145)/(55-93) 123/62     Weight: 72.6 kg (160 lb)  Body mass index is 25.06 kg/m².    Intake/Output - Last 3 Shifts       08/19 0700 - 08/20 0659 08/20 0700 - 08/21 0659 08/21 0700 - 08/22 0659    P.O.  200     I.V. (mL/kg)  2000 (27.5)     IV Piggyback  200     Total Intake(mL/kg)  2400 (33.1)     Urine (mL/kg/hr)  1125     Emesis/NG output  0     Other  0     Stool  0     Total Output  1125     Net  +1275            Urine Occurrence  0 x     Stool Occurrence  0 x     Emesis Occurrence  0 x           Physical Exam   Constitutional: He is oriented to person, place, and time. He appears well-developed and well-nourished. No distress.   Cardiovascular: Normal rate.   Pulmonary/Chest: Effort normal.   Abdominal: Soft. He exhibits no distension. There is tenderness (incisional tenderness). There is no rebound and no guarding.   Incisions bandaged, dressing clean    Neurological: He is alert and oriented to person, place, and time.   Skin: Skin is warm and dry.   Psychiatric: He has a normal mood and affect. His behavior is normal.       Significant Labs:  CBC:   Recent Labs   Lab  08/21/18 0411   WBC  11.17   RBC  4.87   HGB  14.3   HCT  41.9   PLT  178   MCV  86   MCH  29.4   MCHC  34.1     BMP:   Recent Labs   Lab  08/21/18 0411   GLU  142*   NA  138   K  4.1   CL  105   CO2  25   BUN  12   CREATININE  1.0   CALCIUM  9.0   MG  2.4         Assessment/Plan:     * Appendicitis    1 Day Post-Op s/p lap appy    - Advance diet to regular  - PO pain meds  - PRN nausea meds  - OOBTC, ambulate  - Home today if tolerating regular diet            Ayana Yee MD  General Surgery  [facility]

## 2018-08-21 NOTE — PLAN OF CARE
Problem: Patient Care Overview  Goal: Plan of Care Review  Outcome: Ongoing (interventions implemented as appropriate)  Plan of care reviewed with patient and mother. Patient and mother verbalized understanding. Patient is oriented x4. Patient did received prn nausea medication; patient nausea subsided. Patient tolerated clear liquid diet. Patient voided in urinal w/o any issues. Patient received scheduled ABX and prn pain medication per MD orders. Patient Lap sites with band aids remained intact. Patient remained free of any falls or acute events. VSS, Will continue to monitor.

## 2018-08-21 NOTE — NURSING TRANSFER
Nursing Transfer Note      8/20/2018     Transfer To: 640    Transfer via bed    Transfer with 2l to O2    Transported by transport    Medicines sent: yes, flagyl    Chart send with patient: Yes    Notified: mother

## 2018-08-21 NOTE — SUBJECTIVE & OBJECTIVE
Interval History:   NAEON. Tolerating clears. Pain controlled.    Medications:  Continuous Infusions:  Scheduled Meds:   heparin (porcine)  5,000 Units Subcutaneous Q8H    metronidazole  500 mg Intravenous Q8H    sodium chloride 0.9%  3 mL Intravenous Q8H    sugammadex         PRN Meds:diphenhydrAMINE, naloxone, ondansetron, oxyCODONE-acetaminophen, oxyCODONE-acetaminophen, promethazine (PHENERGAN) IVPB, sodium chloride 0.9%     Review of patient's allergies indicates:  No Known Allergies  Objective:     Vital Signs (Most Recent):  Temp: 96.9 °F (36.1 °C) (08/21/18 0746)  Pulse: 79 (08/21/18 0746)  Resp: 17 (08/21/18 0746)  BP: 123/62 (08/21/18 0746)  SpO2: (!) 93 % (08/21/18 0746) Vital Signs (24h Range):  Temp:  [96.9 °F (36.1 °C)-99 °F (37.2 °C)] 96.9 °F (36.1 °C)  Pulse:  [] 79  Resp:  [11-20] 17  SpO2:  [93 %-100 %] 93 %  BP: (113-145)/(55-93) 123/62     Weight: 72.6 kg (160 lb)  Body mass index is 25.06 kg/m².    Intake/Output - Last 3 Shifts       08/19 0700 - 08/20 0659 08/20 0700 - 08/21 0659 08/21 0700 - 08/22 0659    P.O.  200     I.V. (mL/kg)  2000 (27.5)     IV Piggyback  200     Total Intake(mL/kg)  2400 (33.1)     Urine (mL/kg/hr)  1125     Emesis/NG output  0     Other  0     Stool  0     Total Output  1125     Net  +1275            Urine Occurrence  0 x     Stool Occurrence  0 x     Emesis Occurrence  0 x           Physical Exam   Constitutional: He is oriented to person, place, and time. He appears well-developed and well-nourished. No distress.   Cardiovascular: Normal rate.   Pulmonary/Chest: Effort normal.   Abdominal: Soft. He exhibits no distension. There is tenderness (incisional tenderness). There is no rebound and no guarding.   Incisions bandaged, dressing clean   Neurological: He is alert and oriented to person, place, and time.   Skin: Skin is warm and dry.   Psychiatric: He has a normal mood and affect. His behavior is normal.       Significant Labs:  CBC:   Recent Labs   Lab   08/21/18 0411   WBC  11.17   RBC  4.87   HGB  14.3   HCT  41.9   PLT  178   MCV  86   MCH  29.4   MCHC  34.1     BMP:   Recent Labs   Lab  08/21/18 0411   GLU  142*   NA  138   K  4.1   CL  105   CO2  25   BUN  12   CREATININE  1.0   CALCIUM  9.0   MG  2.4

## 2018-08-23 NOTE — DISCHARGE SUMMARY
[facility]  General Surgery  Discharge Summary      Patient Name: Donal Serrano  MRN: [id number]  Admission Date: 8/20/2018  Hospital Length of Stay: 0 days  Discharge Date and Time:  8/21/18  Attending Physician: Andre  Discharging Provider: Brittany Hayes MD  Primary Care Provider: David Kim DO     HPI: Admitted with acute on chronic appendicitis.    Procedure(s) (LRB):  APPENDECTOMY, LAPAROSCOPIC (N/A)     Hospital Course: Pt tolerated procedure well and had an uncomplicated post op course.      Consults:     Significant Diagnostic Studies:     Pending Diagnostic Studies:     None        Final Active Diagnoses:    Diagnosis Date Noted POA    PRINCIPAL PROBLEM:  Appendicitis [K37] 08/20/2018 Yes      Problems Resolved During this Admission:      Discharged Condition: good    Disposition: Home or Self Care    Follow Up:  Follow-up Information     Saulo Powell MD In 2 weeks.    Specialties:  General Surgery, Bariatrics  Why:  For wound re-check/Office to schedule & call you w/Appt.   Contact information:  [address] [place] [address] [id number] [phone]                 Patient Instructions:      Diet Adult Regular     Call MD for:  temperature >100.4     Call MD for:  persistent nausea and vomiting or diarrhea     Call MD for:  severe uncontrolled pain     Call MD for:  redness, tenderness, or signs of infection (pain, swelling, redness, odor or green/yellow discharge around incision site)     Call MD for:  difficulty breathing or increased cough     Call MD for:  severe persistent headache     Call MD for:  worsening rash     Call MD for:  persistent dizziness, light-headedness, or visual disturbances     Call MD for:  increased confusion or weakness     Remove dressing in 24 hours   Order Comments: Steri strips will fall off on their own     Lifting restrictions   Order Comments: No lifting greater than 10 pounds for 2 weeks from day of surgery.  No  pushing/pulling such as vacuuming or raking.  No straining, avoid constipation and take stool softeners as described and laxatives as needed.  No driving while on narcotics and until you can react quickly without pain.     Activity as tolerated     Shower on day dressing removed (No bath)     Medications:  Reconciled Home Medications:      Medication List      START taking these medications    oxyCODONE-acetaminophen 5-325 mg per tablet  Commonly known as:  PERCOCET  Take 1 tablet by mouth every 4 (four) hours as needed.        CONTINUE taking these medications    * DILTIAZEM 2% CREAM  Apply topically 3 (three) times daily. Apply topically to anal area.     * DILTIAZEM 2% CREAM  APPLY TOPICALLY THREE TIMES DAILY TO ANAL AREA     lidocaine 5 % Oint ointment  Commonly known as:  XYLOCAINE  Apply topically once daily.     losartan-hydrochlorothiazide 50-12.5 mg 50-12.5 mg per tablet  Commonly known as:  HYZAAR  Take 1 tablet by mouth once daily.         * This list has 2 medication(s) that are the same as other medications prescribed for you. Read the directions carefully, and ask your doctor or other care provider to review them with you.                Brittany Hayes MD  General Surgery  [facility]

## 2018-08-30 RX ORDER — LOSARTAN POTASSIUM AND HYDROCHLOROTHIAZIDE 12.5; 5 MG/1; MG/1
TABLET ORAL
Qty: 30 TABLET | Refills: 0 | Status: SHIPPED | OUTPATIENT
Start: 2018-08-30 | End: 2018-08-31 | Stop reason: SDUPTHER

## 2018-08-31 ENCOUNTER — PATIENT MESSAGE (OUTPATIENT)
Dept: INTERNAL MEDICINE | Facility: CLINIC | Age: 28
End: 2018-08-31

## 2018-08-31 ENCOUNTER — OFFICE VISIT (OUTPATIENT)
Dept: INTERNAL MEDICINE | Facility: CLINIC | Age: 28
End: 2018-08-31
Payer: COMMERCIAL

## 2018-08-31 VITALS
HEART RATE: 86 BPM | WEIGHT: 164.25 LBS | BODY MASS INDEX: 25.78 KG/M2 | DIASTOLIC BLOOD PRESSURE: 84 MMHG | HEIGHT: 67 IN | TEMPERATURE: 99 F | SYSTOLIC BLOOD PRESSURE: 120 MMHG

## 2018-08-31 DIAGNOSIS — I10 HTN, GOAL BELOW 130/80: Primary | ICD-10-CM

## 2018-08-31 PROCEDURE — 99999 PR PBB SHADOW E&M-EST. PATIENT-LVL III: CPT | Mod: PBBFAC,,, | Performed by: INTERNAL MEDICINE

## 2018-08-31 PROCEDURE — 99213 OFFICE O/P EST LOW 20 MIN: CPT | Mod: S$GLB,,, | Performed by: INTERNAL MEDICINE

## 2018-08-31 PROCEDURE — 3008F BODY MASS INDEX DOCD: CPT | Mod: CPTII,S$GLB,, | Performed by: INTERNAL MEDICINE

## 2018-08-31 RX ORDER — LOSARTAN POTASSIUM AND HYDROCHLOROTHIAZIDE 12.5; 5 MG/1; MG/1
1 TABLET ORAL DAILY
Qty: 90 TABLET | Refills: 3 | Status: SHIPPED | OUTPATIENT
Start: 2018-08-31 | End: 2018-10-29

## 2018-08-31 NOTE — PROGRESS NOTES
Subjective:       Patient ID: Donal Serrano is a 28 y.o. male.    Chief Complaint: Follow-up (BP med refill)    HPI   Pt here for f/u regarding HTN. Pt was started on Hyzaar which has been controlling his BP. No major SE's from the medication.   Review of Systems   Constitutional: Negative for activity change, appetite change, chills, diaphoresis, fatigue, fever and unexpected weight change.   HENT: Negative for postnasal drip, rhinorrhea, sinus pressure, sneezing, sore throat, trouble swallowing and voice change.    Respiratory: Negative for cough, shortness of breath and wheezing.    Cardiovascular: Negative for chest pain, palpitations and leg swelling.   Gastrointestinal: Negative for abdominal pain, blood in stool, constipation, diarrhea, nausea and vomiting.   Genitourinary: Negative for dysuria.   Musculoskeletal: Negative for arthralgias and myalgias.   Skin: Negative for rash and wound.   Allergic/Immunologic: Negative for environmental allergies and food allergies.   Hematological: Negative for adenopathy. Does not bruise/bleed easily.       Objective:      Physical Exam   Constitutional: He is oriented to person, place, and time. He appears well-developed and well-nourished. No distress.   HENT:   Head: Normocephalic and atraumatic.   Eyes: Conjunctivae and EOM are normal. Pupils are equal, round, and reactive to light. Right eye exhibits no discharge. Left eye exhibits no discharge. No scleral icterus.   Neck: Normal range of motion. Neck supple. No JVD present.   Cardiovascular: Normal rate, regular rhythm and normal heart sounds.   No murmur heard.  Pulmonary/Chest: Effort normal and breath sounds normal. No respiratory distress. He has no wheezes. He has no rales.   Musculoskeletal: He exhibits no edema.   Lymphadenopathy:     He has no cervical adenopathy.   Neurological: He is alert and oriented to person, place, and time.   Skin: Skin is warm and dry. No rash noted. He is not diaphoretic.  No pallor.       Assessment:       1. HTN, goal below 130/80        Plan:    1. Controlled, refill Hyzaar 50-12.5 mg daily

## 2018-09-04 ENCOUNTER — OFFICE VISIT (OUTPATIENT)
Dept: SURGERY | Facility: CLINIC | Age: 28
End: 2018-09-04
Payer: COMMERCIAL

## 2018-09-04 VITALS — HEIGHT: 67 IN | WEIGHT: 163 LBS | BODY MASS INDEX: 25.58 KG/M2

## 2018-09-04 DIAGNOSIS — Z09 POSTOP CHECK: Primary | ICD-10-CM

## 2018-09-04 PROBLEM — K37 APPENDICITIS: Status: RESOLVED | Noted: 2018-08-20 | Resolved: 2018-09-04

## 2018-09-04 PROCEDURE — 99024 POSTOP FOLLOW-UP VISIT: CPT | Mod: S$GLB,,, | Performed by: SURGERY

## 2018-09-04 PROCEDURE — 99999 PR PBB SHADOW E&M-EST. PATIENT-LVL II: CPT | Mod: PBBFAC,,, | Performed by: SURGERY

## 2018-09-04 NOTE — PROGRESS NOTES
"Donal Serrano is a 28 y.o. male patient.   1. Postop check      Past Medical History:   Diagnosis Date    Hypertension     Keloid cicatrix     Kidney stones      No past surgical history pertinent negatives on file.  Scheduled Meds:  Continuous Infusions:  PRN Meds:    Review of patient's allergies indicates:  No Known Allergies  There are no hospital problems to display for this patient.    Height 5' 7" (1.702 m), weight 73.9 kg (163 lb).    Subjective S/p lap appy 8/20/18.  He has some residual pain in the rlq that is improving.  Objective Abdomen benign, wounds clear, path reviewed.   Assessment & Plan Doing well after surgery.  Regular duty and follow up prn.       Saulo Powell MD  9/4/2018  "

## 2018-09-04 NOTE — LETTER
September 4, 2018        David Kim, DO  [address] [facility] [place] [address]             [facility] - General Surgery  [address]  [place] [address]  Phone: [phone]   Patient: Donal Serrano   MR Number: [id number]   YOB: 1990   Date of Visit: 9/4/2018       Dear Dr. Kim:    Thank you for referring Donal Serrano to me for evaluation. Attached you will find relevant portions of my assessment and plan of care.    Assessment & Plan Doing well after surgery.  Regular duty and follow up prn.    If you have questions, please do not hesitate to call me. I look forward to following Donal Serrano along with you.    Sincerely,      Saulo Powell MD            CC  Katherine L. Baumgarten, MD Enclosure

## 2018-09-10 ENCOUNTER — CLINICAL SUPPORT (OUTPATIENT)
Dept: INFECTIOUS DISEASES | Facility: CLINIC | Age: 28
End: 2018-09-10
Payer: COMMERCIAL

## 2018-09-10 ENCOUNTER — LAB VISIT (OUTPATIENT)
Dept: LAB | Facility: HOSPITAL | Age: 28
End: 2018-09-10
Payer: COMMERCIAL

## 2018-09-10 ENCOUNTER — PATIENT MESSAGE (OUTPATIENT)
Dept: HEPATOLOGY | Facility: CLINIC | Age: 28
End: 2018-09-10

## 2018-09-10 DIAGNOSIS — R74.8 ELEVATED LIVER ENZYMES: Primary | ICD-10-CM

## 2018-09-10 DIAGNOSIS — R74.8 ELEVATED LIVER ENZYMES: ICD-10-CM

## 2018-09-10 LAB
ALBUMIN SERPL BCP-MCNC: 4 G/DL
ALP SERPL-CCNC: 74 U/L
ALT SERPL W/O P-5'-P-CCNC: 119 U/L
AST SERPL-CCNC: 33 U/L
BILIRUB DIRECT SERPL-MCNC: 0.3 MG/DL
BILIRUB SERPL-MCNC: 0.7 MG/DL
PROT SERPL-MCNC: 7.6 G/DL

## 2018-09-10 PROCEDURE — 36415 COLL VENOUS BLD VENIPUNCTURE: CPT

## 2018-09-10 PROCEDURE — 90471 IMMUNIZATION ADMIN: CPT | Mod: S$GLB,,, | Performed by: INTERNAL MEDICINE

## 2018-09-10 PROCEDURE — 80076 HEPATIC FUNCTION PANEL: CPT

## 2018-09-10 PROCEDURE — 90632 HEPA VACCINE ADULT IM: CPT | Mod: S$GLB,,, | Performed by: INTERNAL MEDICINE

## 2018-09-10 NOTE — TELEPHONE ENCOUNTER
Please call pt to schedule f/u appt with me since liver enzymes remain elevated. Please schedule Fibroscan same day. Pt notified via [facility]

## 2018-09-18 ENCOUNTER — PROCEDURE VISIT (OUTPATIENT)
Dept: HEPATOLOGY | Facility: CLINIC | Age: 28
End: 2018-09-18
Attending: NURSE PRACTITIONER
Payer: COMMERCIAL

## 2018-09-18 ENCOUNTER — OFFICE VISIT (OUTPATIENT)
Dept: HEPATOLOGY | Facility: CLINIC | Age: 28
End: 2018-09-18
Payer: COMMERCIAL

## 2018-09-18 VITALS
RESPIRATION RATE: 20 BRPM | HEART RATE: 85 BPM | BODY MASS INDEX: 26.53 KG/M2 | HEIGHT: 67 IN | TEMPERATURE: 98 F | SYSTOLIC BLOOD PRESSURE: 119 MMHG | WEIGHT: 169.06 LBS | OXYGEN SATURATION: 98 % | DIASTOLIC BLOOD PRESSURE: 83 MMHG

## 2018-09-18 DIAGNOSIS — R74.8 ELEVATED LIVER ENZYMES: ICD-10-CM

## 2018-09-18 DIAGNOSIS — R74.8 ELEVATED LIVER ENZYMES: Primary | ICD-10-CM

## 2018-09-18 PROCEDURE — 99214 OFFICE O/P EST MOD 30 MIN: CPT | Mod: S$GLB,,, | Performed by: NURSE PRACTITIONER

## 2018-09-18 PROCEDURE — 99999 PR PBB SHADOW E&M-EST. PATIENT-LVL IV: CPT | Mod: PBBFAC,,, | Performed by: NURSE PRACTITIONER

## 2018-09-18 PROCEDURE — 3008F BODY MASS INDEX DOCD: CPT | Mod: CPTII,S$GLB,, | Performed by: NURSE PRACTITIONER

## 2018-09-18 PROCEDURE — 91200 LIVER ELASTOGRAPHY: CPT | Mod: S$GLB,,, | Performed by: NURSE PRACTITIONER

## 2018-09-18 NOTE — PROCEDURES
Fibroscan Procedure     Name: Donal Serrano  Date of Procedure : 2018   :: Destiny Ricardo NP  Diagnosis: elevated liver enzymes    Probe: M    Fibroscan readin.1 KPa    Fibrosis:F 0-1     CAP readin dB/m    Steatosis: :<S1

## 2018-09-18 NOTE — PROGRESS NOTES
[facility] Established Patient Visit    Reason for Visit:  F/u elevated liver enzymes    PCP: David Kim    HPI:  This is a 28 y.o. male here for f/u of elevated liver enzymes. He is here with his mother again today. He was seen in 2018 after his transaminases were noted to be elevated. He was being treated for GI infection after a recent trip to [place] at that time. He was treated with tinidazole and norfloxacin and was also on Malarone. He had normal liver seen on u/s and CT scans. He was negative for viral hepatitis A, B, C and has been vaccinated for hep A and B. Has not had hepatitis E testing. ASMA, TAYLOR, AMA all negative. He does not drink alcohol. Was recently started on HTN med. No other med changes. He denies any herbal supplements.     Since last visit, his enzymes have remained elevated. He had appendectomy on 18. He has not had full serologic workup yet. He did have elevated transaminases in 3/2012 but had cholecystitis at that time and had gallbladder removed. Fibroscan done today = no fibrosis, no steatosis. Does have family h/o liver disease - dad with KHALIL cirrhosis and HCC. Had liver transplant in  but  from complications from his liver disease shortly after transplant. Pt works as a  and travels frequently to foreign countries.    He feels well. Denies jaundice, dark urine, hematemesis, melena, slowed mentation, abdominal distention.                ROS:   GENERAL: Denies fever, chills, weight loss/gain, fatigue  HEENT: Denies headaches, dizziness, vision/hearing changes  CARDIOVASCULAR: Denies chest pain, palpitations, or edema  RESPIRATORY: Denies dyspnea, cough  GI: Denies abdominal pain, rectal bleeding, nausea, vomiting. No change in bowel pattern or color  : Denies dysuria, hematuria   SKIN: Denies rash, itching   NEURO: Denies confusion, memory loss, or mood changes  PSYCH: Denies depression or anxiety  HEME/LYMPH: Denies easy bruising or  "bleeding      PMHX:  has a past medical history of Hypertension, Keloid cicatrix, and Kidney stones.    PSHX:  has a past surgical history that includes Cholecystectomy (03/2012) and APPENDECTOMY, LAPAROSCOPIC (N/A, 8/20/2018).    The patient's social and family histories were reviewed by me and updated in the appropriate section of the electronic medical record.    Review of patient's allergies indicates:  No Known Allergies    Current Outpatient Medications on File Prior to Visit   Medication Sig Dispense Refill    losartan-hydrochlorothiazide 50-12.5 mg (HYZAAR) 50-12.5 mg per tablet Take 1 tablet by mouth once daily. 90 tablet 3     No current facility-administered medications on file prior to visit.          Objective Findings:    PHYSICAL EXAM:   Friendly White male, in no acute distress; alert and oriented to person, place and time  VITALS: /83 (BP Location: Right arm, Patient Position: Sitting, BP Method: Large (Automatic))   Pulse 85   Temp 97.8 °F (36.6 °C) (Oral)   Resp 20   Ht 5' 7" (1.702 m)   Wt 76.7 kg (169 lb 1.5 oz)   SpO2 98%   BMI 26.48 kg/m²   HENT: Normocephalic, without obvious abnormality. Oral mucosa pink and moist. Dentition good.  EYES: Sclerae anicteric. No conjunctival pallor.   NECK: Supple. No masses or cervical adenopathy.  CARDIOVASCULAR: Regular rate and rhythm. No murmurs.  RESPIRATORY: Normal respiratory effort. BBS CTA. No wheezes or crackles.  GI: Soft, non-tender, non-distended. No hepatosplenomegaly. No masses palpable. No ascites.  EXTREMITIES:  No clubbing, cyanosis or edema.  SKIN: Warm and dry. No jaundice. No rashes noted to exposed skin. No telangectasias noted. No palmar erythema.  NEURO:  Normal gate. No asterixis.  PSYCH:  Memory intact. Thought and speech pattern appropriate. Behavior normal. No depression or anxiety noted.      Labs:  Lab Results   Component Value Date    WBC 11.17 08/21/2018    HGB 14.3 08/21/2018    HCT 41.9 08/21/2018     " 08/21/2018    CHOL 162 02/26/2018    TRIG 81 02/26/2018    HDL 48 02/26/2018     08/21/2018    K 4.1 08/21/2018    CREATININE 1.0 08/21/2018     (H) 09/10/2018    AST 33 09/10/2018    ALKPHOS 74 09/10/2018    BILITOT 0.7 09/10/2018    ALBUMIN 4.0 09/10/2018       ASSESSMENT:  28 y.o. White male with:  1.  Elevated liver enzymes, hepatocellular pattern  -- viral hepatitis A, B, C negative  -- TAYLOR, AMA, ASMA negative  -- normal liver on u/s and CT  -- Fibroscan today = no fibrosis, no steatosis  -- will finish serologic workup today and can consider MRI and possible liver biopsy in future if enzymes remain elevated        PLAN:  1. Labs today  2. Consider MRI abd w MRCP  3. Consider liver biopsy  4. Will coordinate further workup pending today's results    Thank you for allowing me to participate in the care of KENNETH Adam

## 2018-09-19 ENCOUNTER — PATIENT MESSAGE (OUTPATIENT)
Dept: HEPATOLOGY | Facility: CLINIC | Age: 28
End: 2018-09-19

## 2018-09-26 ENCOUNTER — PATIENT MESSAGE (OUTPATIENT)
Dept: HEPATOLOGY | Facility: CLINIC | Age: 28
End: 2018-09-26

## 2018-09-26 DIAGNOSIS — R74.8 ELEVATED CK: ICD-10-CM

## 2018-09-26 DIAGNOSIS — R74.8 ELEVATED LIVER ENZYMES: Primary | ICD-10-CM

## 2018-09-27 ENCOUNTER — PATIENT MESSAGE (OUTPATIENT)
Dept: HEPATOLOGY | Facility: CLINIC | Age: 28
End: 2018-09-27

## 2018-10-03 ENCOUNTER — LAB VISIT (OUTPATIENT)
Dept: LAB | Facility: HOSPITAL | Age: 28
End: 2018-10-03
Attending: INTERNAL MEDICINE
Payer: COMMERCIAL

## 2018-10-03 ENCOUNTER — OFFICE VISIT (OUTPATIENT)
Dept: HEPATOLOGY | Facility: CLINIC | Age: 28
End: 2018-10-03
Payer: COMMERCIAL

## 2018-10-03 VITALS
HEART RATE: 83 BPM | DIASTOLIC BLOOD PRESSURE: 87 MMHG | OXYGEN SATURATION: 97 % | HEIGHT: 67 IN | BODY MASS INDEX: 26.85 KG/M2 | RESPIRATION RATE: 18 BRPM | WEIGHT: 171.06 LBS | SYSTOLIC BLOOD PRESSURE: 141 MMHG | TEMPERATURE: 97 F

## 2018-10-03 DIAGNOSIS — R74.8 ELEVATED LIVER ENZYMES: Primary | ICD-10-CM

## 2018-10-03 DIAGNOSIS — R74.8 ELEVATED LIVER ENZYMES: ICD-10-CM

## 2018-10-03 PROCEDURE — 3008F BODY MASS INDEX DOCD: CPT | Mod: CPTII,S$GLB,, | Performed by: INTERNAL MEDICINE

## 2018-10-03 PROCEDURE — 99999 PR PBB SHADOW E&M-EST. PATIENT-LVL IV: CPT | Mod: PBBFAC,,, | Performed by: INTERNAL MEDICINE

## 2018-10-03 PROCEDURE — 86376 MICROSOMAL ANTIBODY EACH: CPT

## 2018-10-03 PROCEDURE — 99213 OFFICE O/P EST LOW 20 MIN: CPT | Mod: S$GLB,,, | Performed by: INTERNAL MEDICINE

## 2018-10-03 PROCEDURE — 86256 FLUORESCENT ANTIBODY TITER: CPT | Mod: 91

## 2018-10-03 PROCEDURE — 86038 ANTINUCLEAR ANTIBODIES: CPT

## 2018-10-03 PROCEDURE — 86235 NUCLEAR ANTIGEN ANTIBODY: CPT

## 2018-10-03 NOTE — PATIENT INSTRUCTIONS
Plan:  -  Monitor enzymes, may need biopsy  -  Autoimmune markers.   -  Avoid amoxicillin, azithromycin as they may trigger autoimmune liver problems  -  Return PRN.

## 2018-10-03 NOTE — PROGRESS NOTES
HEPATOLOGY FOLLOW UP    Donal Serrano is here for follow up of 2nd opinion      HPI  28 y.o. male with   Chief Complaint   Patient presents with    2nd opinion   Previously seen by Destiny Pandya NP, with Dr. Margie Dupree.  Here for a second opinion.  I had previously taken care of patient's father, hence, his mom felt she would like to know what I thought of the transaminase elevations.  They occurred off and on, mostly during admissions for cholecystectomy and appendectomy. At other times, mostly normal.  No alk phos or bili elevations.  On losartan for last few months, after above noted.  No h/o alcohol, does have family history for liver disease.  Grandfather  of liver failure, induced by some antibiotic.  Father had hepatitis ?KHALIL, cirrhosis, HCC,  of sepsis during admission for liver transplant.  No diagnosis of autoimmune conditions in the family members.     Review of meds shows amoxicillin and azithromycin were prescribed in case he would need to take them during travel to [place].  Did not actually take either med.     Currently asymptomatic, mild elevation of ALT.      Outpatient Encounter Medications as of 10/3/2018   Medication Sig Dispense Refill    losartan-hydrochlorothiazide 50-12.5 mg (HYZAAR) 50-12.5 mg per tablet Take 1 tablet by mouth once daily. 90 tablet 3     No facility-administered encounter medications on file as of 10/3/2018.      Review of patient's allergies indicates:  No Known Allergies  Past Medical History:   Diagnosis Date    Hypertension     Keloid cicatrix     Kidney stones      Past Surgical History:   Procedure Laterality Date    APPENDECTOMY      APPENDECTOMY, LAPAROSCOPIC N/A 2018    Performed by Saulo Powell MD at [facility] OR [address] [place] [address]    CHOLECYSTECTOMY  2012    LAPAROSCOPIC APPENDECTOMY N/A 2018    Procedure: APPENDECTOMY, LAPAROSCOPIC;  Surgeon: Saulo Powell MD;  Location: [facility] OR [address] [place] [address];  Service: General;   Laterality: N/A;     Social History     Socioeconomic History    Marital status: Single     Spouse name: Not on file    Number of children: Not on file    Years of education: Not on file    Highest education level: Not on file   Social Needs    Financial resource strain: Not on file    Food insecurity - worry: Not on file    Food insecurity - inability: Not on file    Transportation needs - medical: Not on file    Transportation needs - non-medical: Not on file   Occupational History    Occupation:    Tobacco Use    Smoking status: Never Smoker    Smokeless tobacco: Never Used   Substance and Sexual Activity    Alcohol use: No    Drug use: No    Sexual activity: Not Currently   Other Topics Concern    Not on file   Social History Narrative    Not on file       ROS  Gen- no wt loss, gain, fever, chills  HEENT - no congestion, nosebleed, visual or hearing problems  Chest - no cough, shortness of breath, chest pain  Cardiovascular- no chest pain, leg swelling, dyspnea on exertion or at rest, orthopnea  GI- no abdominal pain, nausea, vomiting, constipation, or diarrhea   Musculoskeletal:  no pain or swelling of joints  Neuro - no headaches, dizziness, weakness, tingling numbness in hands or feet,  Skin- no rash, itching  Psych - no depression, anxiety, sleep disturbance, memory loss    Vitals:    10/03/18 0730   BP: (!) 141/87   Pulse: 83   Resp: 18   Temp: 97.2 °F (36.2 °C)       Physical Exam:  General Appearance: Well appearing in no acute distress  Head:   Normocephalic, without obvious abnormality  Eyes:    No scleral icterus, EOMI  ENT: Neck supple, Lips, mucosa, and tongue normal; teeth and gums normal  Lungs: CTA bilaterally in anterior and posterior fields, no wheezes, no crackles.  Heart:  Regular rate and rhythm, S1, S2 normal, no murmurs heard  Abdomen: Soft, non tender, non distended with positive bowel sounds in all four quadrants. No hepatosplenomegaly, ascites, or  mass  Extremities: 2+ pulses, no clubbing, cyanosis or edema  Skin: No rash  Neurologic: CN II-XII intact, alert, oriented x 3, no asterixis.     Lab Results   Component Value Date    GLU 85 09/18/2018    BUN 11 09/18/2018    CREATININE 1.0 09/18/2018    CALCIUM 10.0 09/18/2018     09/18/2018    K 3.3 (L) 09/18/2018     09/18/2018    PROT 8.2 09/18/2018    CO2 27 09/18/2018    ANIONGAP 9 09/18/2018    WBC 7.45 09/18/2018    RBC 5.28 09/18/2018    HGB 15.6 09/18/2018    HCT 44.4 09/18/2018    MCV 84 09/18/2018    MCH 29.5 09/18/2018    MCHC 35.1 09/18/2018     Lab Results   Component Value Date    RDW 11.9 09/18/2018     09/18/2018    MPV 9.3 09/18/2018    GRAN 3.8 09/18/2018    GRAN 50.6 09/18/2018    LYMPH 2.4 09/18/2018    LYMPH 31.5 09/18/2018    MONO 0.8 09/18/2018    MONO 10.9 09/18/2018    EOSINOPHIL 5.4 09/18/2018    BASOPHIL 0.7 09/18/2018    EOS 0.4 09/18/2018    BASO 0.05 09/18/2018    CHOL 162 02/26/2018    TRIG 81 02/26/2018    HDL 48 02/26/2018    CHOLHDL 29.6 02/26/2018    TOTALCHOLEST 3.4 02/26/2018    ALBUMIN 4.4 09/18/2018    BILIDIR 0.3 09/10/2018    AST 36 09/18/2018    ALT 70 (H) 09/18/2018    ALKPHOS 76 09/18/2018    MG 2.4 08/21/2018    LABPROT 9.9 09/18/2018    INR 0.9 09/18/2018       Diagnostics:  U/s normal    Assessment   1. Elevated liver enzymes    I considered med-related elevations, but none came across.  I would however, be cautious about using amoxicillin and azithromycin.    Fibroscan showed no steatosis or fibrosis. TAYLOR screen was negative.  Stress-related elevations can be seen with autoimmune hepatitis.   Hence, worth checking again. CK elevations can be associated with AST elevations, which have been normal. Usually not with ALT elevations.      Ordering ASMA, AMA, anti-LKM.      Plan:  -  Monitor enzymes, may need liver biopsy  -  Autoimmune markers.   -  Avoid amoxicillin, azithromycin as they may trigger autoimmune liver problems  -  Return PRN.

## 2018-10-03 NOTE — Clinical Note
Plan:-  Monitor enzymes, may need biopsy-  Autoimmune markers. -  Avoid amoxicillin, azithromycin as they may trigger autoimmune liver problems-  Return PRN.

## 2018-10-04 ENCOUNTER — TELEPHONE (OUTPATIENT)
Dept: HEPATOLOGY | Facility: CLINIC | Age: 28
End: 2018-10-04

## 2018-10-04 LAB
ANA SER QL IF: NORMAL
LKM AB SER QL: 0.9 UNITS
MITOCHONDRIA AB TITR SER IF: NORMAL {TITER}
SMOOTH MUSCLE AB TITR SER IF: NORMAL {TITER}

## 2018-10-04 NOTE — TELEPHONE ENCOUNTER
----- Message from Esha Echavarria MD sent at 10/4/2018  5:07 PM CDT -----  Pl inform patient:  All autoimmune antibodies are negative.

## 2018-10-19 ENCOUNTER — PATIENT MESSAGE (OUTPATIENT)
Dept: INTERNAL MEDICINE | Facility: CLINIC | Age: 28
End: 2018-10-19

## 2018-10-29 ENCOUNTER — OFFICE VISIT (OUTPATIENT)
Dept: INTERNAL MEDICINE | Facility: CLINIC | Age: 28
End: 2018-10-29
Payer: COMMERCIAL

## 2018-10-29 ENCOUNTER — LAB VISIT (OUTPATIENT)
Dept: LAB | Facility: HOSPITAL | Age: 28
End: 2018-10-29
Attending: INTERNAL MEDICINE
Payer: COMMERCIAL

## 2018-10-29 VITALS
SYSTOLIC BLOOD PRESSURE: 136 MMHG | HEIGHT: 67 IN | TEMPERATURE: 97 F | DIASTOLIC BLOOD PRESSURE: 88 MMHG | HEART RATE: 86 BPM | WEIGHT: 171.06 LBS | RESPIRATION RATE: 18 BRPM | BODY MASS INDEX: 26.85 KG/M2

## 2018-10-29 DIAGNOSIS — J30.9 ALLERGIC SINUSITIS: ICD-10-CM

## 2018-10-29 DIAGNOSIS — R74.8 ELEVATED LIVER ENZYMES: ICD-10-CM

## 2018-10-29 DIAGNOSIS — R74.8 ELEVATED CK: ICD-10-CM

## 2018-10-29 DIAGNOSIS — I10 ESSENTIAL HYPERTENSION: Primary | ICD-10-CM

## 2018-10-29 LAB
ALBUMIN SERPL BCP-MCNC: 4 G/DL
ALP SERPL-CCNC: 69 U/L
ALT SERPL W/O P-5'-P-CCNC: 29 U/L
AST SERPL-CCNC: 24 U/L
BILIRUB DIRECT SERPL-MCNC: 0.2 MG/DL
BILIRUB SERPL-MCNC: 0.3 MG/DL
CK SERPL-CCNC: 205 U/L
PROT SERPL-MCNC: 7.5 G/DL

## 2018-10-29 PROCEDURE — 82550 ASSAY OF CK (CPK): CPT

## 2018-10-29 PROCEDURE — 86790 VIRUS ANTIBODY NOS: CPT

## 2018-10-29 PROCEDURE — 80076 HEPATIC FUNCTION PANEL: CPT

## 2018-10-29 PROCEDURE — 3008F BODY MASS INDEX DOCD: CPT | Mod: CPTII,S$GLB,, | Performed by: INTERNAL MEDICINE

## 2018-10-29 PROCEDURE — 99214 OFFICE O/P EST MOD 30 MIN: CPT | Mod: S$GLB,,, | Performed by: INTERNAL MEDICINE

## 2018-10-29 PROCEDURE — 30000890 MISCELLANEOUS SENDOUT TEST

## 2018-10-29 PROCEDURE — 36415 COLL VENOUS BLD VENIPUNCTURE: CPT | Mod: PO

## 2018-10-29 PROCEDURE — 99999 PR PBB SHADOW E&M-EST. PATIENT-LVL III: CPT | Mod: PBBFAC,,, | Performed by: INTERNAL MEDICINE

## 2018-10-29 RX ORDER — LEVOCETIRIZINE DIHYDROCHLORIDE 5 MG/1
5 TABLET, FILM COATED ORAL NIGHTLY
Qty: 30 TABLET | Refills: 11 | Status: SHIPPED | OUTPATIENT
Start: 2018-10-29 | End: 2019-01-21 | Stop reason: ALTCHOICE

## 2018-10-29 RX ORDER — AMLODIPINE BESYLATE 5 MG/1
5 TABLET ORAL DAILY
Qty: 90 TABLET | Refills: 3 | Status: SHIPPED | OUTPATIENT
Start: 2018-10-29 | End: 2019-03-01

## 2018-10-29 RX ORDER — FLUTICASONE PROPIONATE 50 MCG
2 SPRAY, SUSPENSION (ML) NASAL DAILY
Qty: 16 G | Refills: 12 | Status: SHIPPED | OUTPATIENT
Start: 2018-10-29 | End: 2018-11-28

## 2018-10-29 NOTE — PROGRESS NOTES
Subjective:       Patient ID: Donal Serrano is a 28 y.o. male.    Chief Complaint: Medication Problem (on BP meds for 5 months- muscle fatigue); Nasal Congestion; and Flu Vaccine    HPI   Pt would like to change his BP medication b/c he thinks it is causing sinus issues. + post nasal drip, runny nose.   Review of Systems   Constitutional: Negative for activity change, appetite change, chills, diaphoresis, fatigue, fever and unexpected weight change.   HENT: Positive for congestion, postnasal drip, rhinorrhea, sinus pressure and sinus pain. Negative for sneezing, sore throat, trouble swallowing and voice change.    Respiratory: Negative for cough, shortness of breath and wheezing.    Cardiovascular: Negative for chest pain, palpitations and leg swelling.   Gastrointestinal: Negative for abdominal pain, blood in stool, constipation, diarrhea, nausea and vomiting.   Genitourinary: Negative for dysuria.   Musculoskeletal: Negative for arthralgias and myalgias.   Skin: Negative for rash and wound.   Allergic/Immunologic: Negative for environmental allergies and food allergies.   Hematological: Negative for adenopathy. Does not bruise/bleed easily.       Objective:      Physical Exam   Constitutional: He is oriented to person, place, and time. He appears well-developed and well-nourished. No distress.   HENT:   Head: Normocephalic and atraumatic.   Right Ear: External ear normal.   Left Ear: External ear normal.   Nose: Mucosal edema and rhinorrhea present.   Mouth/Throat: Oropharynx is clear and moist. No oropharyngeal exudate.   Eyes: Conjunctivae and EOM are normal. Pupils are equal, round, and reactive to light. Right eye exhibits no discharge. Left eye exhibits no discharge. No scleral icterus.   Neck: Normal range of motion. Neck supple. No JVD present.   Cardiovascular: Normal rate, regular rhythm and normal heart sounds.   No murmur heard.  Pulmonary/Chest: Effort normal and breath sounds normal. No  respiratory distress. He has no wheezes. He has no rales.   Abdominal: Soft. Bowel sounds are normal. There is no tenderness.   Musculoskeletal: He exhibits no edema.   Lymphadenopathy:     He has no cervical adenopathy.   Neurological: He is alert and oriented to person, place, and time.   Skin: Skin is warm and dry. No rash noted. He is not diaphoretic. No pallor.       Assessment:       1. Essential hypertension    2. Allergic sinusitis        Plan:    1. D/c Hyzaar       Rx Norvasc 5 mg daily    2. Rx Xyzal/Flonase

## 2018-11-05 LAB
MISCELLANEOUS TEST NAME: NORMAL
REFERENCE LAB: NORMAL
SPECIMEN TYPE: NORMAL
TEST RESULT: NORMAL

## 2018-11-13 ENCOUNTER — OFFICE VISIT (OUTPATIENT)
Dept: OPTOMETRY | Facility: CLINIC | Age: 28
End: 2018-11-13
Payer: COMMERCIAL

## 2018-11-13 DIAGNOSIS — H02.884 MEIBOMIAN GLAND DYSFUNCTION (MGD) OF LEFT UPPER EYELID: Primary | ICD-10-CM

## 2018-11-13 PROCEDURE — 92002 INTRM OPH EXAM NEW PATIENT: CPT | Mod: S$GLB,,, | Performed by: OPTOMETRIST

## 2018-11-13 PROCEDURE — 99999 PR PBB SHADOW E&M-EST. PATIENT-LVL II: CPT | Mod: PBBFAC,,, | Performed by: OPTOMETRIST

## 2018-11-13 NOTE — PROGRESS NOTES
HPI     EKTA never  2 week histoy of something flying into left eye  Has foreign body sensation under upper eyelid  Tried visine otc with no relief  Itchy at first, mild red a couple days ago  No changes in vision  No history of eye surgery or injury    Last edited by Tye Driver, OD on 11/13/2018  2:07 PM. (History)              Assessment /Plan     For exam results, see Encounter Report.    Meibomian gland dysfunction (MGD) of left upper eyelid      1. Capped gland removed with swab, also flipped lid, small fb seen in the tear film, also mucous in canthus removed, pt felt relief, start art tears 4x/day x 2-3 days. RTC if no better or worsens.

## 2018-12-19 ENCOUNTER — PATIENT MESSAGE (OUTPATIENT)
Dept: HEPATOLOGY | Facility: CLINIC | Age: 28
End: 2018-12-19

## 2018-12-28 ENCOUNTER — LAB VISIT (OUTPATIENT)
Dept: LAB | Facility: HOSPITAL | Age: 28
End: 2018-12-28
Attending: NURSE PRACTITIONER
Payer: COMMERCIAL

## 2018-12-28 DIAGNOSIS — R74.8 ELEVATED CK: ICD-10-CM

## 2018-12-28 DIAGNOSIS — R74.8 ELEVATED LIVER ENZYMES: ICD-10-CM

## 2018-12-28 LAB
ALBUMIN SERPL BCP-MCNC: 4.2 G/DL
ALP SERPL-CCNC: 68 U/L
ALT SERPL W/O P-5'-P-CCNC: 62 U/L
AST SERPL-CCNC: 48 U/L
BILIRUB DIRECT SERPL-MCNC: 0.2 MG/DL
BILIRUB SERPL-MCNC: 0.4 MG/DL
CK SERPL-CCNC: 896 U/L
PROT SERPL-MCNC: 7.7 G/DL

## 2018-12-28 PROCEDURE — 36415 COLL VENOUS BLD VENIPUNCTURE: CPT

## 2018-12-28 PROCEDURE — 80076 HEPATIC FUNCTION PANEL: CPT

## 2018-12-28 PROCEDURE — 82550 ASSAY OF CK (CPK): CPT

## 2018-12-30 ENCOUNTER — PATIENT MESSAGE (OUTPATIENT)
Dept: HEPATOLOGY | Facility: CLINIC | Age: 28
End: 2018-12-30

## 2019-01-20 NOTE — PROGRESS NOTES
CC: HTN    28 y.o. 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  has d/c caffeine,1/1/2019  Thinks it has helped the HR  Tries to avoid decongestants 2/2 to HTN    Last episode end of December, 12/29/2018  BP today==>132/92  BP @ home==>130-140/85-95    Elevated LFT's that seem to trend w/ the  Elevated CPK, and improve when it is lower  Concern for autoimmune condition  Concern for thyroid contribution     MEDCARD: Reviewed  ROS:  No fever, chills ,or night sweats  No chest pain   No heart palpitations since 1/1/2019  No cough or wheezing  No focal deficits or paresthesia  Remainder of review negative except as previously noted    PMHX: Reviewed  SHX: Reviewed  FHX: Reviewed    PE:  GEN: WDWN, A&O, NAD, conversant and co-operative  EYES: Conj/lids unremarkable, sclera anicteric, Pupils reactive  ENT: O/P w/o exudate or erythema  NECK: Supple w/o lymphadenopathy or thyromegaly  RESP: Efforts unlabored, lungs CTA  Cardiovascular: Heart RRR, no carotid bruits, 1+ pedal pulses, no edema  GI: BS+, soft , NT  MSK: Gait normal. No CCE  NEURO: MAZARIEGOS. No tremor or facial asymmetry  SKIN: Warm and dry    IMPRESSION:  HTN, elevated today  Heart palpitations, seemed to have responded to d/c caffeine and treating stress w/ exercise and prayer  Elevated LFT's  Elevated CPK  HypoK+    PLAN:  D/C amlodipine  Rx irbesartan 150mg- pt to report any sinus congestion  Lab- thyroid w/up and BMP and Mg  Vigorous hydration  EKG  Monitor BP @ gym  Call prn  RTC 3/1/2019

## 2019-01-21 ENCOUNTER — LAB VISIT (OUTPATIENT)
Dept: LAB | Facility: HOSPITAL | Age: 29
End: 2019-01-21
Attending: INTERNAL MEDICINE
Payer: COMMERCIAL

## 2019-01-21 ENCOUNTER — OFFICE VISIT (OUTPATIENT)
Dept: INTERNAL MEDICINE | Facility: CLINIC | Age: 29
End: 2019-01-21

## 2019-01-21 VITALS
RESPIRATION RATE: 16 BRPM | HEART RATE: 92 BPM | DIASTOLIC BLOOD PRESSURE: 92 MMHG | HEIGHT: 68 IN | TEMPERATURE: 98 F | SYSTOLIC BLOOD PRESSURE: 132 MMHG | BODY MASS INDEX: 27.26 KG/M2 | WEIGHT: 179.88 LBS

## 2019-01-21 DIAGNOSIS — R74.8 ELEVATED LIVER ENZYMES: ICD-10-CM

## 2019-01-21 DIAGNOSIS — I10 HYPERTENSION, UNSPECIFIED TYPE: Primary | ICD-10-CM

## 2019-01-21 DIAGNOSIS — R79.89 ELEVATED LFTS: ICD-10-CM

## 2019-01-21 DIAGNOSIS — R74.8 ELEVATED CPK: ICD-10-CM

## 2019-01-21 DIAGNOSIS — E87.6 HYPOKALEMIA: ICD-10-CM

## 2019-01-21 DIAGNOSIS — R00.2 HEART PALPITATIONS: ICD-10-CM

## 2019-01-21 LAB
ANION GAP SERPL CALC-SCNC: 10 MMOL/L
BUN SERPL-MCNC: 10 MG/DL
CALCIUM SERPL-MCNC: 9.6 MG/DL
CHLORIDE SERPL-SCNC: 106 MMOL/L
CO2 SERPL-SCNC: 25 MMOL/L
CREAT SERPL-MCNC: 0.9 MG/DL
EST. GFR  (AFRICAN AMERICAN): >60 ML/MIN/1.73 M^2
EST. GFR  (NON AFRICAN AMERICAN): >60 ML/MIN/1.73 M^2
GLUCOSE SERPL-MCNC: 92 MG/DL
MAGNESIUM SERPL-MCNC: 2.6 MG/DL
POTASSIUM SERPL-SCNC: 4.1 MMOL/L
SODIUM SERPL-SCNC: 141 MMOL/L
T3FREE SERPL-MCNC: 2.8 PG/ML
T4 FREE SERPL-MCNC: 1.06 NG/DL
THYROPEROXIDASE IGG SERPL-ACNC: <6 IU/ML
TSH SERPL DL<=0.005 MIU/L-ACNC: 0.66 UIU/ML

## 2019-01-21 PROCEDURE — 93010 ELECTROCARDIOGRAM REPORT: CPT | Mod: S$PBB,,, | Performed by: INTERNAL MEDICINE

## 2019-01-21 PROCEDURE — 84481 FREE ASSAY (FT-3): CPT

## 2019-01-21 PROCEDURE — 84432 ASSAY OF THYROGLOBULIN: CPT

## 2019-01-21 PROCEDURE — 93005 EKG 12-LEAD: ICD-10-PCS | Mod: S$GLB,,, | Performed by: INTERNAL MEDICINE

## 2019-01-21 PROCEDURE — 84443 ASSAY THYROID STIM HORMONE: CPT

## 2019-01-21 PROCEDURE — 84439 ASSAY OF FREE THYROXINE: CPT

## 2019-01-21 PROCEDURE — 99214 OFFICE O/P EST MOD 30 MIN: CPT | Mod: S$GLB,,, | Performed by: INTERNAL MEDICINE

## 2019-01-21 PROCEDURE — 99214 PR OFFICE/OUTPT VISIT, EST, LEVL IV, 30-39 MIN: ICD-10-PCS | Mod: S$GLB,,, | Performed by: INTERNAL MEDICINE

## 2019-01-21 PROCEDURE — 99999 PR PBB SHADOW E&M-EST. PATIENT-LVL III: CPT | Mod: PBBFAC,,, | Performed by: INTERNAL MEDICINE

## 2019-01-21 PROCEDURE — 99999 PR PBB SHADOW E&M-EST. PATIENT-LVL III: ICD-10-PCS | Mod: PBBFAC,,, | Performed by: INTERNAL MEDICINE

## 2019-01-21 PROCEDURE — 80048 BASIC METABOLIC PNL TOTAL CA: CPT

## 2019-01-21 PROCEDURE — 93005 ELECTROCARDIOGRAM TRACING: CPT | Mod: S$GLB,,, | Performed by: INTERNAL MEDICINE

## 2019-01-21 PROCEDURE — 93010 EKG 12-LEAD: ICD-10-PCS | Mod: S$PBB,,, | Performed by: INTERNAL MEDICINE

## 2019-01-21 PROCEDURE — 86376 MICROSOMAL ANTIBODY EACH: CPT

## 2019-01-21 PROCEDURE — 83735 ASSAY OF MAGNESIUM: CPT

## 2019-01-21 RX ORDER — IRBESARTAN 150 MG/1
150 TABLET ORAL NIGHTLY
Qty: 90 TABLET | Refills: 3 | Status: SHIPPED | OUTPATIENT
Start: 2019-01-21 | End: 2019-04-05 | Stop reason: SDUPTHER

## 2019-01-21 NOTE — MEDICAL/APP STUDENT
Subjective:       Patient ID: Donal Serrano is a 28 y.o. male.    Chief Complaint: Hypertension (BP check); Abnormal Lab (liver tests); and Establish Care    Patient presents to clinic for a BP check. Diagnosed with Essential Hypertension about a year ago. Initially treated with losartan; now on norvasc 5 daily. Home BP checks are: systolic range 130-140 and diastolic range 85-95. Patient also reports some racing heart rates and palpitations at home. Also reports some presyncope and headaches when feeling palpitations. Likely stress induced as went from a Latter day of 200 to 2000. Also stated feels better since stopping caffeine. Checked BP twice; first was 132/92 and second was 130/90. [place] [facility] [place] All autoimmune lab markers came back in normal range. CPK elevated at 896. ALT 62 AND AST 48.      Hypertension   Associated symptoms include headaches and palpitations. Pertinent negatives include no chest pain or shortness of breath.     Review of Systems   Constitutional: Negative for chills and fever.   HENT: Negative for rhinorrhea, sinus pressure and sinus pain.    Eyes: Negative for photophobia and visual disturbance.   Respiratory: Negative for cough and shortness of breath.    Cardiovascular: Positive for palpitations. Negative for chest pain.   Gastrointestinal: Negative for abdominal pain, blood in stool, constipation, diarrhea, nausea and vomiting.   Endocrine: Negative for cold intolerance and heat intolerance.   Genitourinary: Negative for dysuria and hematuria.   Skin: Negative for rash.   Neurological: Positive for dizziness and headaches. Negative for syncope.       Objective:      Physical Exam   Constitutional: He is oriented to person, place, and time. He appears well-developed and well-nourished.   HENT:   Head: Normocephalic and atraumatic.   Eyes: No scleral icterus.   Cardiovascular: Normal rate, regular rhythm and normal heart sounds.   No murmur heard.  Pulmonary/Chest: Effort  normal and breath sounds normal.   Abdominal: Soft. Bowel sounds are normal. He exhibits no distension. There is no tenderness.   Neurological: He is alert and oriented to person, place, and time.   Skin: Skin is warm and dry.       Assessment:       1. Hypertension, unspecified type        Plan:        1. Blood pressure/heart rate  In office EKG was normal; will try Avapro 150 for better blood pressure control. Will repeat BMP as K was 3.3 on 9/2018. Will also check Mg.    2. Autoimmune hepatitis  Continue to follow Dr. Echavarria at [facility] [place]. Will order thyroid lab work (T4, T3, TPO, thyroglobulin, TSH) as autoimmune hepatitis can be related to other autoimmune conditions.     F/u in one month

## 2019-01-22 LAB
THRYOGLOBULIN INTERPRETATION: ABNORMAL
THYROGLOB AB SERPL-ACNC: <1.8 IU/ML
THYROGLOB SERPL-MCNC: 5.2 NG/ML

## 2019-01-28 ENCOUNTER — TELEPHONE (OUTPATIENT)
Dept: INTERNAL MEDICINE | Facility: CLINIC | Age: 29
End: 2019-01-28

## 2019-02-16 ENCOUNTER — IMMUNIZATION (OUTPATIENT)
Dept: INTERNAL MEDICINE | Facility: CLINIC | Age: 29
End: 2019-02-16
Payer: COMMERCIAL

## 2019-02-16 PROCEDURE — 90686 IIV4 VACC NO PRSV 0.5 ML IM: CPT | Mod: S$GLB,,, | Performed by: INTERNAL MEDICINE

## 2019-02-16 PROCEDURE — 90471 IMMUNIZATION ADMIN: CPT | Mod: S$GLB,,, | Performed by: INTERNAL MEDICINE

## 2019-02-16 PROCEDURE — 90471 FLU VACCINE (QUAD) GREATER THAN OR EQUAL TO 3YO PRESERVATIVE FREE IM: ICD-10-PCS | Mod: S$GLB,,, | Performed by: INTERNAL MEDICINE

## 2019-02-16 PROCEDURE — 90686 FLU VACCINE (QUAD) GREATER THAN OR EQUAL TO 3YO PRESERVATIVE FREE IM: ICD-10-PCS | Mod: S$GLB,,, | Performed by: INTERNAL MEDICINE

## 2019-02-28 NOTE — PROGRESS NOTES
CC: HTN    29 y.o. male w/ HTN, elevated CPK and LFT's that trend together, and hx kidney stones  presents for HTN  Less stress @ his [employer]  Tx :irbesartan 150mg  Denied HA or dizziness  BP today==>122/74  BP @ home==>120's/    MEDCARD: Reviewed  ROS:  No fever, chills ,or night sweats  No chest pain or palpitations  No cough or wheezing  No focal deficits or paresthesia  Remainder of review negative except as previously noted    PMHX: Reviewed  SHX: Reviewed  FHX: Reviewed    PE:  VSS:   GEN: WDWN, A&O, NAD, conversant and co-operative  EYES: Conj/lids unremarkable, sclera anicteric  MSK: Gait normal. No CCE  NEURO: MAZARIEGOS. No tremor or facial asymmetry    IMPRESSION:  HTN, improved  Elevated LFT's  Elevated CPK    PLAN:  Continue present meds  F/up w/ Hepatology  RTC 6 mos  Call prn

## 2019-03-01 ENCOUNTER — OFFICE VISIT (OUTPATIENT)
Dept: INTERNAL MEDICINE | Facility: CLINIC | Age: 29
End: 2019-03-01
Payer: COMMERCIAL

## 2019-03-01 VITALS
DIASTOLIC BLOOD PRESSURE: 74 MMHG | SYSTOLIC BLOOD PRESSURE: 122 MMHG | BODY MASS INDEX: 27.43 KG/M2 | OXYGEN SATURATION: 98 % | HEIGHT: 68 IN | WEIGHT: 181 LBS | TEMPERATURE: 98 F | HEART RATE: 84 BPM

## 2019-03-01 DIAGNOSIS — I10 ESSENTIAL HYPERTENSION: Primary | ICD-10-CM

## 2019-03-01 PROCEDURE — 99999 PR PBB SHADOW E&M-EST. PATIENT-LVL III: CPT | Mod: PBBFAC,,, | Performed by: INTERNAL MEDICINE

## 2019-03-01 PROCEDURE — 3078F PR MOST RECENT DIASTOLIC BLOOD PRESSURE < 80 MM HG: ICD-10-PCS | Mod: CPTII,S$GLB,, | Performed by: INTERNAL MEDICINE

## 2019-03-01 PROCEDURE — 3008F PR BODY MASS INDEX (BMI) DOCUMENTED: ICD-10-PCS | Mod: CPTII,S$GLB,, | Performed by: INTERNAL MEDICINE

## 2019-03-01 PROCEDURE — 99213 OFFICE O/P EST LOW 20 MIN: CPT | Mod: S$GLB,,, | Performed by: INTERNAL MEDICINE

## 2019-03-01 PROCEDURE — 3008F BODY MASS INDEX DOCD: CPT | Mod: CPTII,S$GLB,, | Performed by: INTERNAL MEDICINE

## 2019-03-01 PROCEDURE — 99999 PR PBB SHADOW E&M-EST. PATIENT-LVL III: ICD-10-PCS | Mod: PBBFAC,,, | Performed by: INTERNAL MEDICINE

## 2019-03-01 PROCEDURE — 3074F PR MOST RECENT SYSTOLIC BLOOD PRESSURE < 130 MM HG: ICD-10-PCS | Mod: CPTII,S$GLB,, | Performed by: INTERNAL MEDICINE

## 2019-03-01 PROCEDURE — 3078F DIAST BP <80 MM HG: CPT | Mod: CPTII,S$GLB,, | Performed by: INTERNAL MEDICINE

## 2019-03-01 PROCEDURE — 3074F SYST BP LT 130 MM HG: CPT | Mod: CPTII,S$GLB,, | Performed by: INTERNAL MEDICINE

## 2019-03-01 PROCEDURE — 99213 PR OFFICE/OUTPT VISIT, EST, LEVL III, 20-29 MIN: ICD-10-PCS | Mod: S$GLB,,, | Performed by: INTERNAL MEDICINE

## 2019-03-01 NOTE — MEDICAL/APP STUDENT
"    HPI: Mr. Donal Serrano, 29 y.o., seen in clinic for 1 month f/u due to elevated CK (896) and LFT levels (AST 48, ALT 62) from 12/28/18 on a background of essential HTN and a FHx of liver cancer in his father. Pt reports resolution of HA and less stressed from his job due to having two days off per week where previously he only had a half day off per week. Pt reports no complications with current medication. Pt denies F/C/N/V chest palpitations or SOB. Pt reports at home BP monitoring with levels ranging from 105-125 systolic and 75-85 diastolic.    Current medications: Irbesartan (AVAPRO) 150 mg tablet    Allergies: Amoxicillin and Azithromycin per hepatology recommendation for autoimmune rxn.    Vitals: /78, Pulse 84, Temp 98.4 F, SpO2 98%    Physical/Objective: On general inspection, pt was comfortable, alert and in no apparent distress.     CVS exam demonstrated a regular rate and rhythm, normal S1 and S2, with no murmurs, friction rubs or gallops.     Respiratory exam demonstrated clear lung fields bilaterally with no wheezes or crackles.    Labs: TPA, TSH, T4/T3, BMP and Mg2+ taken on 01/21/19 were all within normal limits.    EKG 12 lead: "normal sinus rhythm, ST elevation, consider early repolarization" taken on 01/21/19.    Assessment: 29 y.o., male here for f/u of elevated CK and LFT's on a background of essential HTN and FHx of liver cancer. Pt's BP is well controlled and headaches are not occuring with medication. Pt reports no complications/issues with his current medication.    Plan:     Essential HTN: Continue current medication (Irbesartan 150 mg) and serial monitoring of BP at home.    Elevated CK and LFT: Labs (TPA, TSH, T4/T3) have ruled out thyroid pathology. Continue to monitor and recheck CK and LFT values in six months, if still elevated or rising referral to rheumatology for further evaluation would be recommended.         "

## 2019-03-19 ENCOUNTER — OFFICE VISIT (OUTPATIENT)
Dept: HEPATOLOGY | Facility: CLINIC | Age: 29
End: 2019-03-19
Payer: COMMERCIAL

## 2019-03-19 VITALS
SYSTOLIC BLOOD PRESSURE: 128 MMHG | HEIGHT: 68 IN | DIASTOLIC BLOOD PRESSURE: 88 MMHG | BODY MASS INDEX: 27.63 KG/M2 | TEMPERATURE: 98 F | OXYGEN SATURATION: 98 % | WEIGHT: 182.31 LBS | HEART RATE: 73 BPM

## 2019-03-19 DIAGNOSIS — R74.8 ELEVATED LIVER ENZYMES: Primary | ICD-10-CM

## 2019-03-19 PROCEDURE — 3079F DIAST BP 80-89 MM HG: CPT | Mod: CPTII,S$GLB,, | Performed by: INTERNAL MEDICINE

## 2019-03-19 PROCEDURE — 99213 PR OFFICE/OUTPT VISIT, EST, LEVL III, 20-29 MIN: ICD-10-PCS | Mod: S$GLB,,, | Performed by: INTERNAL MEDICINE

## 2019-03-19 PROCEDURE — 3074F SYST BP LT 130 MM HG: CPT | Mod: CPTII,S$GLB,, | Performed by: INTERNAL MEDICINE

## 2019-03-19 PROCEDURE — 99999 PR PBB SHADOW E&M-EST. PATIENT-LVL III: CPT | Mod: PBBFAC,,, | Performed by: INTERNAL MEDICINE

## 2019-03-19 PROCEDURE — 3008F PR BODY MASS INDEX (BMI) DOCUMENTED: ICD-10-PCS | Mod: CPTII,S$GLB,, | Performed by: INTERNAL MEDICINE

## 2019-03-19 PROCEDURE — 99213 OFFICE O/P EST LOW 20 MIN: CPT | Mod: S$GLB,,, | Performed by: INTERNAL MEDICINE

## 2019-03-19 PROCEDURE — 3079F PR MOST RECENT DIASTOLIC BLOOD PRESSURE 80-89 MM HG: ICD-10-PCS | Mod: CPTII,S$GLB,, | Performed by: INTERNAL MEDICINE

## 2019-03-19 PROCEDURE — 3074F PR MOST RECENT SYSTOLIC BLOOD PRESSURE < 130 MM HG: ICD-10-PCS | Mod: CPTII,S$GLB,, | Performed by: INTERNAL MEDICINE

## 2019-03-19 PROCEDURE — 99999 PR PBB SHADOW E&M-EST. PATIENT-LVL III: ICD-10-PCS | Mod: PBBFAC,,, | Performed by: INTERNAL MEDICINE

## 2019-03-19 PROCEDURE — 3008F BODY MASS INDEX DOCD: CPT | Mod: CPTII,S$GLB,, | Performed by: INTERNAL MEDICINE

## 2019-03-19 NOTE — PATIENT INSTRUCTIONS
Plan:  -  Trial of Low carb diet: limit intake of carbs to half or 1/3 of current. Includes rice, potatoes, pasta, cakes/cookies, sweets, alcohol, fruits.    -  Monitor CMP (enzymes) every 6 weeks, may need liver biopsy  -  Avoid amoxicillin, azithromycin as they may trigger autoimmune liver problems  -  Return 6 months.

## 2019-03-19 NOTE — Clinical Note
Plan:-  Trial of Low carb diet: limit intake of carbs to half or 1/3 of current. Includes rice, potatoes, pasta, cakes/cookies, sweets, alcohol, fruits.  -  Monitor CMP (enzymes) every 6 weeks, may need liver biopsy-  Avoid amoxicillin, azithromycin as they may trigger autoimmune liver problems-  Return 6 months.

## 2019-03-19 NOTE — PROGRESS NOTES
HEPATOLOGY FOLLOW UP    Donal Serrano is here for follow up of Elevated Hepatic Enzymes      HPI  29 y.o. male with   Chief Complaint   Patient presents with    Elevated Hepatic Enzymes     Interval history:  Has felt well.  Liver enzymes need to be repeated every 6 weeks.  Will try low carb diet, and monitor enzymes.       Previously seen by Destiny Pandya NP, with Dr. Margie Dupree.  Here for a follow-up.  I had previously taken care of patient's father, hence, his mom felt she would like to know what I thought of the transaminase elevations.  They occurred off and on, mostly during admissions for cholecystectomy and appendectomy. At other times, mostly normal.  No alk phos or bili elevations.  On losartan for last few months, after above noted.  No h/o alcohol, does have family history for liver disease.  Grandfather  of liver failure, induced by some antibiotic.  Father had hepatitis ?KHALIL, cirrhosis, HCC,  of sepsis during admission for liver transplant.  No diagnosis of autoimmune conditions in the family members.     Review of meds shows amoxicillin and azithromycin were prescribed in case he would need to take them during travel to [place].  Did not actually take either med.     Currently asymptomatic, mild elevation of ALT.      Outpatient Encounter Medications as of 3/19/2019   Medication Sig Dispense Refill    irbesartan (AVAPRO) 150 MG tablet Take 1 tablet (150 mg total) by mouth every evening. 90 tablet 3     No facility-administered encounter medications on file as of 3/19/2019.      Review of patient's allergies indicates:  No Known Allergies  Past Medical History:   Diagnosis Date    Keloid cicatrix     Kidney stones      Past Surgical History:   Procedure Laterality Date    APPENDECTOMY      APPENDECTOMY, LAPAROSCOPIC N/A 2018    Performed by Saulo Powell MD at [facility] OR 2ND FLR    CHOLECYSTECTOMY  2012     Social History     Socioeconomic History    Marital  status: Single     Spouse name: Not on file    Number of children: Not on file    Years of education: Not on file    Highest education level: Not on file   Social Needs    Financial resource strain: Not on file    Food insecurity - worry: Not on file    Food insecurity - inability: Not on file    Transportation needs - medical: Not on file    Transportation needs - non-medical: Not on file   Occupational History    Occupation:    Tobacco Use    Smoking status: Never Smoker    Smokeless tobacco: Never Used   Substance and Sexual Activity    Alcohol use: No    Drug use: No    Sexual activity: Not Currently   Other Topics Concern    Not on file   Social History Narrative    Not on file       ROS  Gen- no wt changes.  HEENT - Neg  Chest - neg  Cardiovascular- neg  GI- neg  Musculoskeletal:  neg  Neuro - neg  Skin- neg  Psych - neg    Vitals:    03/19/19 0937   BP: 128/88   Pulse: 73   Temp: 97.9 °F (36.6 °C)       Physical Exam:  General Appearance: Well appearing in no acute distress  Head:   Normocephalic, without obvious abnormality  Eyes:    No scleral icterus, EOMI  ENT: neg  Abdomen: non distended   Extremities: no clubbing, cyanosis, edema  Skin: No rash  Neurologic: alert, oriented x 3, no tremors.     Lab Results   Component Value Date    GLU 92 01/21/2019    BUN 10 01/21/2019    CREATININE 0.9 01/21/2019    CALCIUM 9.6 01/21/2019     01/21/2019    K 4.1 01/21/2019     01/21/2019    PROT 7.7 12/28/2018    CO2 25 01/21/2019    ANIONGAP 10 01/21/2019    WBC 7.45 09/18/2018    RBC 5.28 09/18/2018    HGB 15.6 09/18/2018    HCT 44.4 09/18/2018    MCV 84 09/18/2018    MCH 29.5 09/18/2018    MCHC 35.1 09/18/2018     Lab Results   Component Value Date    RDW 11.9 09/18/2018     09/18/2018    MPV 9.3 09/18/2018    GRAN 3.8 09/18/2018    GRAN 50.6 09/18/2018    LYMPH 2.4 09/18/2018    LYMPH 31.5 09/18/2018    MONO 0.8 09/18/2018    MONO 10.9 09/18/2018    EOSINOPHIL 5.4 09/18/2018     BASOPHIL 0.7 09/18/2018    EOS 0.4 09/18/2018    BASO 0.05 09/18/2018    CHOL 162 02/26/2018    TRIG 81 02/26/2018    HDL 48 02/26/2018    CHOLHDL 29.6 02/26/2018    TOTALCHOLEST 3.4 02/26/2018    ALBUMIN 4.2 12/28/2018    BILIDIR 0.2 12/28/2018    AST 48 (H) 12/28/2018    ALT 62 (H) 12/28/2018    ALKPHOS 68 12/28/2018    MG 2.6 01/21/2019    LABPROT 9.9 09/18/2018    INR 0.9 09/18/2018       Diagnostics:  U/s normal    Assessment   No diagnosis found.     I considered med-related elevations, but none came across.  I would however, be cautious about using amoxicillin and azithromycin.   Fibroscan showed no steatosis or fibrosis.  TAYLOR screen was negative.  Stress-related elevations can be seen with autoimmune hepatitis.   Hence, checked AIH markers again, were negative.  CK elevations can be associated with AST elevations, which have been normal. Usually not with ALT elevations.      Will try low carb diet. X 6 months.  If no improvement of enzymes, will get a liver biopsy.      Plan:  -  Trial of Low carb diet: limit intake of carbs to half or 1/3 of current. Includes rice, potatoes, pasta, cakes/cookies, sweets, alcohol, fruits.    -  Monitor CMP (enzymes) every 6 weeks, may need liver biopsy  -  Avoid amoxicillin, azithromycin as they may trigger autoimmune liver problems  -  Return 6 months.

## 2019-03-19 NOTE — Clinical Note
Plan:-  Low carb diet: limit intake of carbs to half or 1/3 of current. Includes rice, potatoes, pasta, cakes/cookies, sweets, alcohol, fruits.  -  Monitor enzymes every 6 weeks, may need liver biopsy-  Avoid amoxicillin, azithromycin as they may trigger autoimmune liver problems-  Return 6 months.

## 2019-03-20 ENCOUNTER — TELEPHONE (OUTPATIENT)
Dept: HEPATOLOGY | Facility: CLINIC | Age: 29
End: 2019-03-20

## 2019-03-20 NOTE — TELEPHONE ENCOUNTER
----- Message from Esha Echavarria MD sent at 3/19/2019 10:58 AM CDT -----  Plan:  -  Trial of Low carb diet: limit intake of carbs to half or 1/3 of current. Includes rice, potatoes, pasta, cakes/cookies, sweets, alcohol, fruits.    -  Monitor CMP (enzymes) every 6 weeks, may need liver biopsy  -  Avoid amoxicillin, azithromycin as they may trigger autoimmune liver problems  -  Return 6 months.

## 2019-03-21 ENCOUNTER — TELEPHONE (OUTPATIENT)
Dept: HEPATOLOGY | Facility: CLINIC | Age: 29
End: 2019-03-21

## 2019-03-21 NOTE — TELEPHONE ENCOUNTER
----- Message from Esha Echavarria MD sent at 3/19/2019 10:48 AM CDT -----  Plan:  -  Low carb diet: limit intake of carbs to half or 1/3 of current. Includes rice, potatoes, pasta, cakes/cookies, sweets, alcohol, fruits.    -  Monitor enzymes every 6 weeks, may need liver biopsy  -  Avoid amoxicillin, azithromycin as they may trigger autoimmune liver problems  -  Return 6 months.

## 2019-04-05 ENCOUNTER — PATIENT MESSAGE (OUTPATIENT)
Dept: INTERNAL MEDICINE | Facility: CLINIC | Age: 29
End: 2019-04-05

## 2019-04-05 ENCOUNTER — TELEPHONE (OUTPATIENT)
Dept: INTERNAL MEDICINE | Facility: CLINIC | Age: 29
End: 2019-04-05

## 2019-04-05 RX ORDER — IRBESARTAN 150 MG/1
150 TABLET ORAL NIGHTLY
Qty: 90 TABLET | Refills: 3 | Status: SHIPPED | OUTPATIENT
Start: 2019-04-05 | End: 2020-02-17 | Stop reason: SDUPTHER

## 2019-04-30 ENCOUNTER — PATIENT MESSAGE (OUTPATIENT)
Dept: SURGERY | Facility: CLINIC | Age: 29
End: 2019-04-30

## 2019-05-01 ENCOUNTER — LAB VISIT (OUTPATIENT)
Dept: LAB | Facility: HOSPITAL | Age: 29
End: 2019-05-01
Attending: INTERNAL MEDICINE
Payer: COMMERCIAL

## 2019-05-01 ENCOUNTER — PATIENT MESSAGE (OUTPATIENT)
Dept: HEPATOLOGY | Facility: HOSPITAL | Age: 29
End: 2019-05-01

## 2019-05-01 DIAGNOSIS — R74.8 ELEVATED LIVER ENZYMES: ICD-10-CM

## 2019-05-01 LAB
ALBUMIN SERPL BCP-MCNC: 4.3 G/DL (ref 3.5–5.2)
ALP SERPL-CCNC: 72 U/L (ref 55–135)
ALT SERPL W/O P-5'-P-CCNC: 20 U/L (ref 10–44)
ANION GAP SERPL CALC-SCNC: 9 MMOL/L (ref 8–16)
AST SERPL-CCNC: 20 U/L (ref 10–40)
BILIRUB SERPL-MCNC: 1 MG/DL (ref 0.1–1)
BUN SERPL-MCNC: 11 MG/DL (ref 6–20)
CALCIUM SERPL-MCNC: 9.7 MG/DL (ref 8.7–10.5)
CHLORIDE SERPL-SCNC: 106 MMOL/L (ref 95–110)
CO2 SERPL-SCNC: 22 MMOL/L (ref 23–29)
CREAT SERPL-MCNC: 1.1 MG/DL (ref 0.5–1.4)
EST. GFR  (AFRICAN AMERICAN): >60 ML/MIN/1.73 M^2
EST. GFR  (NON AFRICAN AMERICAN): >60 ML/MIN/1.73 M^2
GLUCOSE SERPL-MCNC: 121 MG/DL (ref 70–110)
POTASSIUM SERPL-SCNC: 3.8 MMOL/L (ref 3.5–5.1)
PROT SERPL-MCNC: 7.9 G/DL (ref 6–8.4)
SODIUM SERPL-SCNC: 137 MMOL/L (ref 136–145)

## 2019-05-01 PROCEDURE — 80053 COMPREHEN METABOLIC PANEL: CPT

## 2019-05-01 PROCEDURE — 36415 COLL VENOUS BLD VENIPUNCTURE: CPT | Mod: PO

## 2019-05-02 ENCOUNTER — OFFICE VISIT (OUTPATIENT)
Dept: SURGERY | Facility: CLINIC | Age: 29
End: 2019-05-02
Payer: COMMERCIAL

## 2019-05-02 VITALS
SYSTOLIC BLOOD PRESSURE: 143 MMHG | DIASTOLIC BLOOD PRESSURE: 99 MMHG | HEART RATE: 72 BPM | TEMPERATURE: 98 F | BODY MASS INDEX: 27.58 KG/M2 | WEIGHT: 182 LBS | HEIGHT: 68 IN

## 2019-05-02 DIAGNOSIS — R10.84 GENERALIZED ABDOMINAL PAIN: ICD-10-CM

## 2019-05-02 DIAGNOSIS — R10.31 RIGHT LOWER QUADRANT PAIN: ICD-10-CM

## 2019-05-02 PROCEDURE — 3008F PR BODY MASS INDEX (BMI) DOCUMENTED: ICD-10-PCS | Mod: CPTII,S$GLB,, | Performed by: SURGERY

## 2019-05-02 PROCEDURE — 99999 PR PBB SHADOW E&M-EST. PATIENT-LVL III: ICD-10-PCS | Mod: PBBFAC,,, | Performed by: SURGERY

## 2019-05-02 PROCEDURE — 3080F PR MOST RECENT DIASTOLIC BLOOD PRESSURE >= 90 MM HG: ICD-10-PCS | Mod: CPTII,S$GLB,, | Performed by: SURGERY

## 2019-05-02 PROCEDURE — 99214 PR OFFICE/OUTPT VISIT, EST, LEVL IV, 30-39 MIN: ICD-10-PCS | Mod: S$GLB,,, | Performed by: SURGERY

## 2019-05-02 PROCEDURE — 3008F BODY MASS INDEX DOCD: CPT | Mod: CPTII,S$GLB,, | Performed by: SURGERY

## 2019-05-02 PROCEDURE — 99214 OFFICE O/P EST MOD 30 MIN: CPT | Mod: S$GLB,,, | Performed by: SURGERY

## 2019-05-02 PROCEDURE — 3080F DIAST BP >= 90 MM HG: CPT | Mod: CPTII,S$GLB,, | Performed by: SURGERY

## 2019-05-02 PROCEDURE — 3077F PR MOST RECENT SYSTOLIC BLOOD PRESSURE >= 140 MM HG: ICD-10-PCS | Mod: CPTII,S$GLB,, | Performed by: SURGERY

## 2019-05-02 PROCEDURE — 3077F SYST BP >= 140 MM HG: CPT | Mod: CPTII,S$GLB,, | Performed by: SURGERY

## 2019-05-02 PROCEDURE — 99999 PR PBB SHADOW E&M-EST. PATIENT-LVL III: CPT | Mod: PBBFAC,,, | Performed by: SURGERY

## 2019-05-02 NOTE — LETTER
[facility] - General [facility]  [address]  [facility] [address]  Phone: [phone] May 2, 2019      Kimi Valladares MD  [address] [facility] [address]    Patient: Donal Serrano   MR Number: [id number]   YOB: 1990   Date of Visit: 5/2/2019     Dear Dr. Valladares:    Thank you for referring Donal Serrano to me for evaluation. Attached you will find relevant portions of my assessment and plan of care.    Mr. Serrano is status post laparoscopic appendectomy for chronic appendicits 9/18, now with RLQ pain for two months.  The pain is not worsening, comes and goes spontaneously (notices it a little after urination) but more frequently over time and there is no radiation.  He denies fevers.      On physical examination, there is slight tenderness is just above the right ilioinguinal line, without rebound or guarding.  No right inguinal hernia. We will obtain CT and urine analysis.    If you have questions, please do not hesitate to call me. I look forward to following Donal Serrano along with you.    Sincerely,      Saulo Powell MD  Professor, [employer]  Section Head, General Surgery  [facility]    WSR/afw

## 2019-05-02 NOTE — PROGRESS NOTES
I have seen the patient, reviewed the Student's history and physical, assessment and plan. I have personally interviewed and examined the patient at bedside and: agree with the findings.     S/p lap appy for chronic appendicits 9/18 now with rlq pain for 2 months.  The pain is not worsening, comes and goes spontaneously (notices it a little after urination) but more frequently over time and there is no radiation.  He denies fevers.  On PE: there is slight tenderness is just above the right ilioinguinal line, without rebound or guarding.  No rih.  Obtain ct and urine analysis.

## 2019-05-06 ENCOUNTER — HOSPITAL ENCOUNTER (OUTPATIENT)
Dept: RADIOLOGY | Facility: HOSPITAL | Age: 29
Discharge: HOME | End: 2019-05-06
Attending: SURGERY
Payer: COMMERCIAL

## 2019-05-06 DIAGNOSIS — R10.84 GENERALIZED ABDOMINAL PAIN: ICD-10-CM

## 2019-05-06 PROCEDURE — 25500020 PHARM REV CODE 255: Performed by: SURGERY

## 2019-05-06 PROCEDURE — 74177 CT ABD & PELVIS W/CONTRAST: CPT | Mod: 26,,, | Performed by: RADIOLOGY

## 2019-05-06 PROCEDURE — 74177 CT ABD & PELVIS W/CONTRAST: CPT | Mod: TC

## 2019-05-06 PROCEDURE — 74177 CT ABDOMEN PELVIS WITH CONTRAST: ICD-10-PCS | Mod: 26,,, | Performed by: RADIOLOGY

## 2019-05-06 RX ADMIN — IOHEXOL 75 ML: 350 INJECTION, SOLUTION INTRAVENOUS at 01:05

## 2019-05-08 ENCOUNTER — TELEPHONE (OUTPATIENT)
Dept: SURGERY | Facility: CLINIC | Age: 29
End: 2019-05-08

## 2019-05-08 NOTE — TELEPHONE ENCOUNTER
Informed patient of Dr Powell feedback regarding groin pain       No further questions or concerns at this time.

## 2019-05-08 NOTE — TELEPHONE ENCOUNTER
----- Message from Saulo Powell MD sent at 5/7/2019  6:24 PM CDT -----  I think his pain is due to a groin pull as ct and ua are ok.  Please let him know.

## 2019-06-12 ENCOUNTER — LAB VISIT (OUTPATIENT)
Dept: LAB | Facility: HOSPITAL | Age: 29
End: 2019-06-12
Attending: INTERNAL MEDICINE
Payer: COMMERCIAL

## 2019-06-12 DIAGNOSIS — R74.8 ELEVATED LIVER ENZYMES: ICD-10-CM

## 2019-06-12 LAB
ALBUMIN SERPL BCP-MCNC: 4 G/DL (ref 3.5–5.2)
ALP SERPL-CCNC: 75 U/L (ref 55–135)
ALT SERPL W/O P-5'-P-CCNC: 24 U/L (ref 10–44)
ANION GAP SERPL CALC-SCNC: 10 MMOL/L (ref 8–16)
AST SERPL-CCNC: 20 U/L (ref 10–40)
BILIRUB SERPL-MCNC: 0.5 MG/DL (ref 0.1–1)
BUN SERPL-MCNC: 12 MG/DL (ref 6–20)
CALCIUM SERPL-MCNC: 9.6 MG/DL (ref 8.7–10.5)
CHLORIDE SERPL-SCNC: 106 MMOL/L (ref 95–110)
CO2 SERPL-SCNC: 23 MMOL/L (ref 23–29)
CREAT SERPL-MCNC: 1 MG/DL (ref 0.5–1.4)
EST. GFR  (AFRICAN AMERICAN): >60 ML/MIN/1.73 M^2
EST. GFR  (NON AFRICAN AMERICAN): >60 ML/MIN/1.73 M^2
GLUCOSE SERPL-MCNC: 143 MG/DL (ref 70–110)
POTASSIUM SERPL-SCNC: 4 MMOL/L (ref 3.5–5.1)
PROT SERPL-MCNC: 7.5 G/DL (ref 6–8.4)
SODIUM SERPL-SCNC: 139 MMOL/L (ref 136–145)

## 2019-06-12 PROCEDURE — 80053 COMPREHEN METABOLIC PANEL: CPT

## 2019-06-12 PROCEDURE — 36415 COLL VENOUS BLD VENIPUNCTURE: CPT | Mod: PO

## 2019-07-01 ENCOUNTER — TELEPHONE (OUTPATIENT)
Dept: HEPATOLOGY | Facility: CLINIC | Age: 29
End: 2019-07-01

## 2019-07-01 NOTE — TELEPHONE ENCOUNTER
----- Message from Esha Echavarria MD sent at 6/29/2019 10:29 PM CDT -----  Enzymes remain normal. Pl inform patient.

## 2019-08-13 ENCOUNTER — OFFICE VISIT (OUTPATIENT)
Dept: DERMATOLOGY | Facility: CLINIC | Age: 29
End: 2019-08-13
Payer: COMMERCIAL

## 2019-08-13 DIAGNOSIS — D22.9 MULTIPLE BENIGN NEVI: Primary | ICD-10-CM

## 2019-08-13 DIAGNOSIS — Z87.898 HISTORY OF ATYPICAL NEVUS: ICD-10-CM

## 2019-08-13 DIAGNOSIS — Z12.83 SCREENING EXAM FOR SKIN CANCER: ICD-10-CM

## 2019-08-13 PROCEDURE — 99214 OFFICE O/P EST MOD 30 MIN: CPT | Mod: S$GLB,,, | Performed by: DERMATOLOGY

## 2019-08-13 PROCEDURE — 99214 PR OFFICE/OUTPT VISIT, EST, LEVL IV, 30-39 MIN: ICD-10-PCS | Mod: S$GLB,,, | Performed by: DERMATOLOGY

## 2019-08-13 PROCEDURE — 99999 PR PBB SHADOW E&M-EST. PATIENT-LVL II: CPT | Mod: PBBFAC,,, | Performed by: DERMATOLOGY

## 2019-08-13 PROCEDURE — 99999 PR PBB SHADOW E&M-EST. PATIENT-LVL II: ICD-10-PCS | Mod: PBBFAC,,, | Performed by: DERMATOLOGY

## 2019-08-13 NOTE — PROGRESS NOTES
Subjective:       Patient ID:  Donal Serrano is a 29 y.o. male who presents for   Chief Complaint   Patient presents with    Skin Check     UBSE      History of Present Illness: The patient presents for follow up of skin check.    The patient was last seen on: 8/2017 for TBSE  H/o atypical nevi    Other skin complaints: none        Review of Systems   Constitutional: Negative for weight loss, fatigue, night sweats and malaise.   HENT: Negative for headaches.    Respiratory: Negative for cough and shortness of breath.    Gastrointestinal: Negative for indigestion.   Skin: Positive for activity-related sunscreen use. Negative for daily sunscreen use, recent sunburn and wears hat.   Neurological: Negative for headaches.   Hematologic/Lymphatic: Negative for adenopathy. Does not bruise/bleed easily.        Objective:    Physical Exam   Constitutional: He appears well-developed and well-nourished. No distress.   Neurological: He is alert and oriented to person, place, and time. He is not disoriented.   Psychiatric: He has a normal mood and affect.   Skin:   Areas Examined (abnormalities noted in diagram):   Scalp / Hair Palpated and Inspected  Head / Face Inspection Performed  Neck Inspection Performed  Chest / Axilla Inspection Performed  Abdomen Inspection Performed  Genitals / Buttocks / Groin Inspection Performed  Back Inspection Performed  RUE Inspected  LUE Inspection Performed  RLE Inspected  LLE Inspection Performed  Nails and Digits Inspection Performed                       Diagram Legend     Erythematous scaling macule/papule c/w actinic keratosis       Vascular papule c/w angioma      Pigmented verrucoid papule/plaque c/w seborrheic keratosis      Yellow umbilicated papule c/w sebaceous hyperplasia      Irregularly shaped tan macule c/w lentigo     1-2 mm smooth white papules consistent with Milia      Movable subcutaneous cyst with punctum c/w epidermal inclusion cyst      Subcutaneous movable  cyst c/w pilar cyst      Firm pink to brown papule c/w dermatofibroma      Pedunculated fleshy papule(s) c/w skin tag(s)      Evenly pigmented macule c/w junctional nevus     Mildly variegated pigmented, slightly irregular-bordered macule c/w mildly atypical nevus      Flesh colored to evenly pigmented papule c/w intradermal nevus       Pink pearly papule/plaque c/w basal cell carcinoma      Erythematous hyperkeratotic cursted plaque c/w SCC      Surgical scar with no sign of skin cancer recurrence      Open and closed comedones      Inflammatory papules and pustules      Verrucoid papule consistent consistent with wart     Erythematous eczematous patches and plaques     Dystrophic onycholytic nail with subungual debris c/w onychomycosis     Umbilicated papule    Erythematous-base heme-crusted tan verrucoid plaque consistent with inflamed seborrheic keratosis     Erythematous Silvery Scaling Plaque c/w Psoriasis     See annotation      Assessment / Plan:        Multiple benign nevi  total body skin examination performed today including at least 12 points as noted in physical examination. No lesions suspicious for malignancy noted.  Reassurance provided.  Instructed patient to observe lesion(s) for changes and follow up in clinic if changes are noted. Discussed ABCDE's of moles and brochure provided.      History of atypical nevus  Area of previous atypical nevi examined. Site well healed with no signs of recurrence.    Total body skin examination performed today including at least 12 points as noted in physical examination. No lesions suspicious for malignancy noted.      Screening exam for skin cancer             Follow up in about 1 year (around 8/13/2020).

## 2019-10-07 ENCOUNTER — PATIENT MESSAGE (OUTPATIENT)
Dept: INTERNAL MEDICINE | Facility: CLINIC | Age: 29
End: 2019-10-07

## 2019-10-08 ENCOUNTER — PATIENT OUTREACH (OUTPATIENT)
Dept: ADMINISTRATIVE | Facility: OTHER | Age: 29
End: 2019-10-08

## 2019-10-09 ENCOUNTER — TELEPHONE (OUTPATIENT)
Dept: SPINE | Facility: CLINIC | Age: 29
End: 2019-10-09

## 2019-10-09 DIAGNOSIS — M54.5 LOW BACK PAIN, UNSPECIFIED BACK PAIN LATERALITY, UNSPECIFIED CHRONICITY, WITH SCIATICA PRESENCE UNSPECIFIED: Primary | ICD-10-CM

## 2019-10-09 NOTE — PROGRESS NOTES
Subjective:      Patient ID: Donal Serrano is a 29 y.o. male.    Chief Complaint: Low-back Pain      HPI  (Emir)    History of HTN.     Seen by Dr. Field in June 2016 for back pain. XRs looked good. Was given dose pack, mobic, and robaxin. Lost to follow up.     He had injury to his back on Thursday 10/3/19 while working out (doing squats). He had spasms in his back at the bottom of his squat and had some difficulty getting back up. He had intermittent spasms for rest of the day and now with constant LBP into his tailbone. No leg pain, but hips feel tight. Some improvement with ice/heat- this helps with spasms but not pain. Pain is worse with prolonged sitting- he has stiffness when he gets up. He rates his pain as a 4 on a scale of 1-10. Pain is more aching in nature. No numbness, tingling, or weakness.     He has taken OTC aleve with ? Improvement. No PT, injections, or surgery on his back.        Review of Systems   Constitution: Negative for fever, malaise/fatigue, night sweats, weight gain and weight loss.   HENT: Negative for hearing loss, nosebleeds and odynophagia.    Eyes: Negative for blurred vision and double vision.   Cardiovascular: Negative for chest pain, irregular heartbeat and palpitations.   Respiratory: Negative for cough, hemoptysis, shortness of breath and wheezing.    Endocrine: Negative for cold intolerance and polydipsia.   Hematologic/Lymphatic: Does not bruise/bleed easily.   Skin: Negative for dry skin, poor wound healing, rash and suspicious lesions.   Musculoskeletal: Positive for back pain and muscle cramps.        See HPI for pertinent positives.   Gastrointestinal: Negative for bloating, abdominal pain, constipation, diarrhea, hematochezia, melena, nausea and vomiting.   Genitourinary: Negative for bladder incontinence, dysuria, hematuria, hesitancy and incomplete emptying.   Neurological: Positive for headaches and paresthesias. Negative for disturbances in  coordination, dizziness, focal weakness, loss of balance, numbness, seizures and weakness.   Psychiatric/Behavioral: Negative for depression and hallucinations. The patient is not nervous/anxious.            Objective:        General: Donal is well-developed, well-nourished, appears stated age, in no acute distress, alert and oriented to time, place and person.     General    Vitals reviewed.  Constitutional: He is oriented to person, place, and time. He appears well-developed and well-nourished.   Pulmonary/Chest: Effort normal.   Abdominal: He exhibits no distension.   Neurological: He is alert and oriented to person, place, and time.   Psychiatric: He has a normal mood and affect. His behavior is normal. Judgment and thought content normal.           Gait: normal, tandem walking is normal and he is able to heel/toe stand.     On exam of the lumbar spine, Inspection of back is normal, minimal central lower lumbar tenderness.     Skin in lumbar region is warm to the touch without visible rashes.     muscle tone normal without spasm, limited range of motion with pain  Pain in flexion. and Pain in extension.    Strength testing of the bilateral LEs shows  Right hip abduction:  +5/5  Left hip abduction:  +5/5  Right hip flexion:  +5/5  Left hip flexion:  +5/5  Right hip extensors:  +5/5  Left hip extensors:  +5/5  Right quadriceps:  +5/5  Left quadriceps:  +5/5  Right hamstring:  +5/5  Left hamstring:  +5/5  Right dorsiflexion:  +5/5  Left dorsiflexion:  +5/5  Right plantar flexion:  +5/5  Left plantar flexion:  +5/5   Right EHL:  +5/5   Left EHL:  +5/5    negative clonus of bilateral LEs.     negative straight leg raise on bilateral LEs.     DTRs:  Right patellar:  +2     Left patellar:  +2  Right achilles:  +2   Left achilles:  +2    Sensation is grossly intact in L2, L3, L4, L5, and S1 distribution.    Right hip has no pain with IR/ER. Left hip has no pain with IR/ER.      On exam of bilateral UEs, patient has  full painfree ROM with no signs of clubbing, laxity, cyanosis, edema, instability, weakness, or tenderness.         Assessment:       1. Acute low back pain without sciatica, unspecified back pain laterality    2. Myofascial muscle pain           Plan:       Orders Placed This Encounter    meloxicam (MOBIC) 15 MG tablet    methocarbamol (ROBAXIN) 500 MG Tab       Injury to his back on Thursday 10/3/19 while working out (doing squats). He had spasms in his back at the bottom of his squat and had some difficulty getting back up. He had intermittent spasms for rest of the day and now with constant LBP into his tailbone. No leg pain, but hips feel tight. Previous lumbar XRs looked good and pain appears more myofascial in nature. Treatment options reviewed with patient and following plan made:     - New prescription for mobic to take daily with food until pain is better. Reviewed dosing and side effects. Stop OTC aleve.   - New prescription for robaxin to take prn spasms. Reviewed dosing and side effects, may only tolerate at night.   - Hold on lumbar XRs for now, may revisit if pain does not improve.  - Okay to get back to working out on his own letting pain be his guide. No heavy lifting, squats, or dead lifts until pain is better.   - Consider PT if no improvement. Family member is PT and he will get with them. Email me if he needs script.     Follow-up: Follow up if symptoms worsen or fail to improve. If there are any questions prior to this, the patient was instructed to contact the office.

## 2019-10-09 NOTE — TELEPHONE ENCOUNTER
----- Message from Rosy Aguilar PA-C sent at 10/9/2019  8:36 AM CDT -----  He needs lumbar XRS prior to her visit with me on Friday.

## 2019-10-11 ENCOUNTER — OFFICE VISIT (OUTPATIENT)
Dept: SPINE | Facility: CLINIC | Age: 29
End: 2019-10-11
Payer: COMMERCIAL

## 2019-10-11 VITALS
HEART RATE: 72 BPM | BODY MASS INDEX: 27.58 KG/M2 | HEIGHT: 68 IN | DIASTOLIC BLOOD PRESSURE: 78 MMHG | WEIGHT: 182 LBS | SYSTOLIC BLOOD PRESSURE: 133 MMHG

## 2019-10-11 DIAGNOSIS — M54.50 ACUTE LOW BACK PAIN WITHOUT SCIATICA, UNSPECIFIED BACK PAIN LATERALITY: Primary | ICD-10-CM

## 2019-10-11 DIAGNOSIS — M79.18 MYOFASCIAL MUSCLE PAIN: ICD-10-CM

## 2019-10-11 PROCEDURE — 99203 OFFICE O/P NEW LOW 30 MIN: CPT | Mod: S$GLB,,, | Performed by: PHYSICIAN ASSISTANT

## 2019-10-11 PROCEDURE — 3008F PR BODY MASS INDEX (BMI) DOCUMENTED: ICD-10-PCS | Mod: CPTII,S$GLB,, | Performed by: PHYSICIAN ASSISTANT

## 2019-10-11 PROCEDURE — 3075F PR MOST RECENT SYSTOLIC BLOOD PRESS GE 130-139MM HG: ICD-10-PCS | Mod: CPTII,S$GLB,, | Performed by: PHYSICIAN ASSISTANT

## 2019-10-11 PROCEDURE — 3078F DIAST BP <80 MM HG: CPT | Mod: CPTII,S$GLB,, | Performed by: PHYSICIAN ASSISTANT

## 2019-10-11 PROCEDURE — 99999 PR PBB SHADOW E&M-EST. PATIENT-LVL III: ICD-10-PCS | Mod: PBBFAC,,, | Performed by: PHYSICIAN ASSISTANT

## 2019-10-11 PROCEDURE — 99999 PR PBB SHADOW E&M-EST. PATIENT-LVL III: CPT | Mod: PBBFAC,,, | Performed by: PHYSICIAN ASSISTANT

## 2019-10-11 PROCEDURE — 99203 PR OFFICE/OUTPT VISIT, NEW, LEVL III, 30-44 MIN: ICD-10-PCS | Mod: S$GLB,,, | Performed by: PHYSICIAN ASSISTANT

## 2019-10-11 PROCEDURE — 3008F BODY MASS INDEX DOCD: CPT | Mod: CPTII,S$GLB,, | Performed by: PHYSICIAN ASSISTANT

## 2019-10-11 PROCEDURE — 3078F PR MOST RECENT DIASTOLIC BLOOD PRESSURE < 80 MM HG: ICD-10-PCS | Mod: CPTII,S$GLB,, | Performed by: PHYSICIAN ASSISTANT

## 2019-10-11 PROCEDURE — 3075F SYST BP GE 130 - 139MM HG: CPT | Mod: CPTII,S$GLB,, | Performed by: PHYSICIAN ASSISTANT

## 2019-10-11 RX ORDER — MELOXICAM 15 MG/1
15 TABLET ORAL DAILY
Qty: 30 TABLET | Refills: 2 | Status: SHIPPED | OUTPATIENT
Start: 2019-10-11 | End: 2020-03-04

## 2019-10-11 RX ORDER — METHOCARBAMOL 500 MG/1
500 TABLET, FILM COATED ORAL 3 TIMES DAILY PRN
Qty: 90 TABLET | Refills: 0 | Status: SHIPPED | OUTPATIENT
Start: 2019-10-11 | End: 2019-11-10

## 2019-10-11 NOTE — PATIENT INSTRUCTIONS
It was nice to meet you today.     I think your pain is more muscular in nature and should continue to improve.     I sent meloxicam to your pharmacy. This helps with pain and inflammation. It replaces the aleve. Take daily with food until pain is better.     I also sent methocarbamol to your pharmacy. This is a muscle relaxer and helps with muscle spasms. Be careful, it can make you sleepy. You can take this with the meloxicam.     You can get back to working out as pain allows. No heavy lifting, squats, dead lifts until pain is gone.     I would hold on xrays at this time, but we can revisit if pain does not improve. Let me know if you want a prescription for physical therapy.     I will be leaving in November, but you can follow up with Oneyda Rivera PA-C here at [facility] or you can see Dr. Field/Mandie Mejia PA-C at [facility].     Email us if you need anything.     Rosy

## 2019-10-20 ENCOUNTER — OFFICE VISIT (OUTPATIENT)
Dept: URGENT CARE | Facility: CLINIC | Age: 29
End: 2019-10-20
Payer: COMMERCIAL

## 2019-10-20 VITALS
HEART RATE: 84 BPM | TEMPERATURE: 98 F | BODY MASS INDEX: 27.47 KG/M2 | HEIGHT: 67 IN | DIASTOLIC BLOOD PRESSURE: 86 MMHG | OXYGEN SATURATION: 97 % | RESPIRATION RATE: 20 BRPM | SYSTOLIC BLOOD PRESSURE: 130 MMHG | WEIGHT: 175 LBS

## 2019-10-20 DIAGNOSIS — R10.2 PELVIC PAIN: Primary | ICD-10-CM

## 2019-10-20 LAB
BILIRUB UR QL STRIP: NEGATIVE
GLUCOSE UR QL STRIP: NEGATIVE
KETONES UR QL STRIP: NEGATIVE
LEUKOCYTE ESTERASE UR QL STRIP: NEGATIVE
PH, POC UA: 6 (ref 5–8)
POC BLOOD, URINE: NEGATIVE
POC NITRATES, URINE: NEGATIVE
PROT UR QL STRIP: NEGATIVE
SP GR UR STRIP: 1.01 (ref 1–1.03)
UROBILINOGEN UR STRIP-ACNC: NORMAL (ref 0.3–2.2)

## 2019-10-20 PROCEDURE — S0119 PR ONDANSETRON, ORAL, 4MG: ICD-10-PCS | Mod: S$GLB,,, | Performed by: FAMILY MEDICINE

## 2019-10-20 PROCEDURE — S0119 ONDANSETRON 4 MG: HCPCS | Mod: S$GLB,,, | Performed by: FAMILY MEDICINE

## 2019-10-20 PROCEDURE — 3075F SYST BP GE 130 - 139MM HG: CPT | Mod: CPTII,S$GLB,, | Performed by: FAMILY MEDICINE

## 2019-10-20 PROCEDURE — 87086 URINE CULTURE/COLONY COUNT: CPT

## 2019-10-20 PROCEDURE — 99214 OFFICE O/P EST MOD 30 MIN: CPT | Mod: S$GLB,,, | Performed by: FAMILY MEDICINE

## 2019-10-20 PROCEDURE — 3079F DIAST BP 80-89 MM HG: CPT | Mod: CPTII,S$GLB,, | Performed by: FAMILY MEDICINE

## 2019-10-20 PROCEDURE — 81003 POCT URINALYSIS, DIPSTICK, AUTOMATED, W/O SCOPE: ICD-10-PCS | Mod: QW,S$GLB,, | Performed by: FAMILY MEDICINE

## 2019-10-20 PROCEDURE — 81003 URINALYSIS AUTO W/O SCOPE: CPT | Mod: QW,S$GLB,, | Performed by: FAMILY MEDICINE

## 2019-10-20 PROCEDURE — 3008F PR BODY MASS INDEX (BMI) DOCUMENTED: ICD-10-PCS | Mod: CPTII,S$GLB,, | Performed by: FAMILY MEDICINE

## 2019-10-20 PROCEDURE — 3079F PR MOST RECENT DIASTOLIC BLOOD PRESSURE 80-89 MM HG: ICD-10-PCS | Mod: CPTII,S$GLB,, | Performed by: FAMILY MEDICINE

## 2019-10-20 PROCEDURE — 3075F PR MOST RECENT SYSTOLIC BLOOD PRESS GE 130-139MM HG: ICD-10-PCS | Mod: CPTII,S$GLB,, | Performed by: FAMILY MEDICINE

## 2019-10-20 PROCEDURE — 3008F BODY MASS INDEX DOCD: CPT | Mod: CPTII,S$GLB,, | Performed by: FAMILY MEDICINE

## 2019-10-20 PROCEDURE — 99214 PR OFFICE/OUTPT VISIT, EST, LEVL IV, 30-39 MIN: ICD-10-PCS | Mod: S$GLB,,, | Performed by: FAMILY MEDICINE

## 2019-10-20 RX ORDER — ONDANSETRON 4 MG/1
4 TABLET, ORALLY DISINTEGRATING ORAL
Status: COMPLETED | OUTPATIENT
Start: 2019-10-20 | End: 2019-10-20

## 2019-10-20 RX ORDER — ONDANSETRON 4 MG/1
4 TABLET, FILM COATED ORAL EVERY 8 HOURS PRN
Qty: 12 TABLET | Refills: 0 | Status: SHIPPED | OUTPATIENT
Start: 2019-10-20 | End: 2020-03-04

## 2019-10-20 RX ORDER — CIPROFLOXACIN 500 MG/1
500 TABLET ORAL 2 TIMES DAILY
Qty: 28 TABLET | Refills: 0 | Status: SHIPPED | OUTPATIENT
Start: 2019-10-20 | End: 2019-11-03

## 2019-10-20 RX ADMIN — ONDANSETRON 4 MG: 4 TABLET, ORALLY DISINTEGRATING ORAL at 10:10

## 2019-10-20 NOTE — PROGRESS NOTES
"Subjective:       Patient ID: Donal Serrano is a 29 y.o. male.    Vitals:  height is 5' 7" (1.702 m) and weight is 79.4 kg (175 lb). His oral temperature is 97.5 °F (36.4 °C). His blood pressure is 130/86 and his pulse is 84. His respiration is 20 and oxygen saturation is 97%.     Chief Complaint: Urinary Tract Infection    This is a 29 y.o. male   with Past Medical History:  No date: Keloid cicatrix  No date: Kidney stones   and Past Surgical History:  No date: APPENDECTOMY  03/2012: CHOLECYSTECTOMY  8/20/2018: LAPAROSCOPIC APPENDECTOMY; N/A      Comment:  Procedure: APPENDECTOMY, LAPAROSCOPIC;  Surgeon: Saulo Powell MD;  Location: [facility] OR [address] [place] [address];  Service:                General;  Laterality: N/A;  who presents today with a chief complaint of pelvic pain that began this morning. He says that he feels pressure in his bladder. He's thrown up twice this morning. He hasn't taken any medication to help relieve his symptoms.     Urinary Tract Infection    This is a new problem. The current episode started today. The problem has been unchanged. The pain is at a severity of 4/10. The pain is moderate. There has been no fever. He is not sexually active. There is no history of pyelonephritis. Associated symptoms include vomiting. Pertinent negatives include no chills, frequency, hematuria, nausea, urgency or rash. He has tried nothing for the symptoms.       Constitution: Negative for chills and fever.   Neck: Negative for painful lymph nodes.   Gastrointestinal: Positive for vomiting. Negative for abdominal pain and nausea.   Genitourinary: Positive for pelvic pain. Negative for dysuria, frequency, urgency, urine decreased, hematuria, history of kidney stones, genital trauma, painful intercourse, genital sore, penile discharge, painful ejaculation, penile pain, penile swelling, scrotal swelling and testicular pain.   Musculoskeletal: Negative for back pain.   Skin: Negative for rash and " lesion.   Hematologic/Lymphatic: Negative for swollen lymph nodes.       Objective:      Physical Exam   Constitutional: He appears well-developed and well-nourished.   HENT:   Head: Normocephalic and atraumatic.   Eyes: Pupils are equal, round, and reactive to light. EOM are normal.   Neck: Normal range of motion. Neck supple.   Cardiovascular: Normal rate and regular rhythm.   Pulmonary/Chest: Effort normal and breath sounds normal.   Abdominal: Soft. Bowel sounds are normal. He exhibits no mass. Tenderness: suprapubic tenderness  There is guarding. There is no rebound.   Genitourinary: Rectal exam shows tenderness. Prostate is tender.   Nursing note and vitals reviewed.        Assessment:       1. Pelvic pain      suspect prostatitis  Plan:         Pelvic pain  -     POCT Urinalysis, Dipstick, Automated, W/O Scope  -     Culture, Urine  -     ondansetron disintegrating tablet 4 mg  -     ciprofloxacin HCl (CIPRO) 500 MG tablet; Take 1 tablet (500 mg total) by mouth 2 (two) times daily. for 14 days  Dispense: 28 tablet; Refill: 0  -     ondansetron (ZOFRAN) 4 MG tablet; Take 1 tablet (4 mg total) by mouth every 8 (eight) hours as needed.  Dispense: 12 tablet; Refill: 0    to followup with PCP this week and monitor progress.

## 2019-10-20 NOTE — PATIENT INSTRUCTIONS
Prostatitis    The prostate gland is located deep inside the body at the base of the bladder. Prostatitis is an inflammation of the prostate gland. This can occur with or without infection. Most cases of prostatitis are long term (chronic). Most do not involve a bacterial infection.  · Chronic prostatitis is more common in older men. It is usually an inflammatory condition and not an infection. But, bacterial infection can also cause chronic prostatitis. It can cause pain in the rectum, urethra, bladder, or scrotum. It can also make you unable to fully empty the bladder.  You may urinate often, or have burning with urination. Prostatitis may also cause painful ejaculation and erectile dysfunction.  · Sudden onset (acute) prostatitis usually occurs in men younger than 35. It is from a bacterial infection. You may have severe symptoms such as fever, chills, muscle aches, and pain in the area between the scrotum and anus (perineum). You may have a hard time urinating, or have pain or burning when urinating. There may be blood or pus in the urine.  Your healthcare provider may do a culture test on prostate fluids or discharge from the penis. This will help determine if bacteria are the cause. Treatment can include antibiotics, anti-inflammatory medicine, prostate medicines, and stool softeners.  Home care  These guidelines will help you care for yourself at home:  · Rest at home until the fever is gone and you are feeling better.  · A hot sitz bath may offer some relief. Fill a tub with 6 inches of hot water. Allow the water to run so you can keep it hot for 10 to 15 minutes.  · Drink plenty of fluids. Do not drink alcohol or caffeine until all symptoms are gone.  · If your healthcare gives you an antibiotic, take it exactly as you are told. Take it until it is all gone.  · Constipation causes straining and pain. Avoid constipation by eating natural laxatives such as prunes, fresh fruits, and whole-grain cereals. If  needed, use a mild over-the-counter (OTC) laxative for constipation. An OTC stool softener may be used to keep the stools soft.  · If sex is uncomfortable or painful, avoid until symptoms get better.  · You may use OTC medicines for pain and fever, unless another medicine was given. If you have chronic liver or kidney disease, talk with your healthcare provider before using these medicines. Also talk with your provider if you've ever had a stomach ulcer or GI bleeding.  Follow-up care  Follow up with your healthcare provider, a urologist, or as advised to be sure you are responding to treatment. Your healthcare provider may want to see you after you finish your antibiotics to be sure the infection has cleared. If a culture was taken, you may call for the results as directed. A culture test can help your healthcare provider know if you are on the correct antibiotic.  Call 911  Call 911 if any of these occur:  · Weakness, dizziness, or fainting  When to seek medical advice  Call your healthcare provider right away if any of these occur:  · Fever of 100.4°F (38°C) or higher after 3 days of treatment, or as advised  · Unable to pass urine for 8 hours  · Pressure or pain in your bladder gets worse  · Painful swelling of the testicle or scrotum  Date Last Reviewed: 10/1/2016  © 6000-5002 [employer]. [address] [place] [address], [place] [address]. All rights reserved. This information is not intended as a substitute for professional medical care. Always follow your healthcare professional's instructions.

## 2019-10-21 LAB — BACTERIA UR CULT: NO GROWTH

## 2019-10-22 ENCOUNTER — PES CALL (OUTPATIENT)
Dept: ADMINISTRATIVE | Facility: CLINIC | Age: 29
End: 2019-10-22

## 2019-10-23 ENCOUNTER — HOSPITAL ENCOUNTER (OUTPATIENT)
Dept: RADIOLOGY | Facility: HOSPITAL | Age: 29
Discharge: HOME | End: 2019-10-23
Attending: PHYSICIAN ASSISTANT
Payer: COMMERCIAL

## 2019-10-23 ENCOUNTER — OFFICE VISIT (OUTPATIENT)
Dept: INTERNAL MEDICINE | Facility: CLINIC | Age: 29
End: 2019-10-23
Payer: COMMERCIAL

## 2019-10-23 ENCOUNTER — HOSPITAL ENCOUNTER (OUTPATIENT)
Dept: RADIOLOGY | Facility: HOSPITAL | Age: 29
Discharge: HOME | End: 2019-10-23
Attending: INTERNAL MEDICINE
Payer: COMMERCIAL

## 2019-10-23 VITALS
DIASTOLIC BLOOD PRESSURE: 88 MMHG | BODY MASS INDEX: 28.09 KG/M2 | TEMPERATURE: 98 F | HEART RATE: 80 BPM | WEIGHT: 179 LBS | SYSTOLIC BLOOD PRESSURE: 126 MMHG | HEIGHT: 67 IN | RESPIRATION RATE: 18 BRPM

## 2019-10-23 DIAGNOSIS — N41.9 PROSTATITIS, UNSPECIFIED PROSTATITIS TYPE: ICD-10-CM

## 2019-10-23 DIAGNOSIS — R10.9 ABDOMINAL PAIN, UNSPECIFIED ABDOMINAL LOCATION: ICD-10-CM

## 2019-10-23 DIAGNOSIS — I10 HYPERTENSION, UNSPECIFIED TYPE: ICD-10-CM

## 2019-10-23 PROCEDURE — 99214 PR OFFICE/OUTPT VISIT, EST, LEVL IV, 30-39 MIN: ICD-10-PCS | Mod: S$GLB,,, | Performed by: INTERNAL MEDICINE

## 2019-10-23 PROCEDURE — 72120 X-RAY BEND ONLY L-S SPINE: CPT | Mod: 26,,, | Performed by: RADIOLOGY

## 2019-10-23 PROCEDURE — 74019 RADEX ABDOMEN 2 VIEWS: CPT | Mod: TC,PO

## 2019-10-23 PROCEDURE — 99999 PR PBB SHADOW E&M-EST. PATIENT-LVL IV: CPT | Mod: PBBFAC,,, | Performed by: INTERNAL MEDICINE

## 2019-10-23 PROCEDURE — 72100 X-RAY EXAM L-S SPINE 2/3 VWS: CPT | Mod: 26,,, | Performed by: RADIOLOGY

## 2019-10-23 PROCEDURE — 72100 XR LUMBAR SPINE AP AND LAT WITH FLEX/EXT: ICD-10-PCS | Mod: 26,,, | Performed by: RADIOLOGY

## 2019-10-23 PROCEDURE — 74019 XR ABDOMEN FLAT AND ERECT: ICD-10-PCS | Mod: 26,,, | Performed by: RADIOLOGY

## 2019-10-23 PROCEDURE — 74019 RADEX ABDOMEN 2 VIEWS: CPT | Mod: 26,,, | Performed by: RADIOLOGY

## 2019-10-23 PROCEDURE — 3074F SYST BP LT 130 MM HG: CPT | Mod: CPTII,S$GLB,, | Performed by: INTERNAL MEDICINE

## 2019-10-23 PROCEDURE — 99999 PR PBB SHADOW E&M-EST. PATIENT-LVL IV: ICD-10-PCS | Mod: PBBFAC,,, | Performed by: INTERNAL MEDICINE

## 2019-10-23 PROCEDURE — 3008F PR BODY MASS INDEX (BMI) DOCUMENTED: ICD-10-PCS | Mod: CPTII,S$GLB,, | Performed by: INTERNAL MEDICINE

## 2019-10-23 PROCEDURE — 3079F DIAST BP 80-89 MM HG: CPT | Mod: CPTII,S$GLB,, | Performed by: INTERNAL MEDICINE

## 2019-10-23 PROCEDURE — 3074F PR MOST RECENT SYSTOLIC BLOOD PRESSURE < 130 MM HG: ICD-10-PCS | Mod: CPTII,S$GLB,, | Performed by: INTERNAL MEDICINE

## 2019-10-23 PROCEDURE — 3008F BODY MASS INDEX DOCD: CPT | Mod: CPTII,S$GLB,, | Performed by: INTERNAL MEDICINE

## 2019-10-23 PROCEDURE — 72120 X-RAY BEND ONLY L-S SPINE: CPT | Mod: TC,PO

## 2019-10-23 PROCEDURE — 3079F PR MOST RECENT DIASTOLIC BLOOD PRESSURE 80-89 MM HG: ICD-10-PCS | Mod: CPTII,S$GLB,, | Performed by: INTERNAL MEDICINE

## 2019-10-23 PROCEDURE — 99214 OFFICE O/P EST MOD 30 MIN: CPT | Mod: S$GLB,,, | Performed by: INTERNAL MEDICINE

## 2019-10-23 PROCEDURE — 72120 XR LUMBAR SPINE AP AND LAT WITH FLEX/EXT: ICD-10-PCS | Mod: 26,,, | Performed by: RADIOLOGY

## 2019-10-23 NOTE — PROGRESS NOTES
CC:     29 y.o. yo male  W/ HTN presents in f/up to UC visit  W/up w/ dx prostatitis and Rx Cipro, but sx haven;t improved and he has continued to have bloating  Answers for HPI/ROS submitted by the patient on 10/22/2019   Abdominal pain  Chronicity: new  Onset: in the past 7 days, around other [employer] members that had GI bug  Onset quality: undetermined  Frequency: 2 to 4 times per day  Episode duration: 1 hours  Pain location: suprapubic region, right flank, rectum  Pain - numeric: 5/10  Pain quality: aching, cramping, a sensation of fullness, sharp  Radiates to: suprapubic region, pelvis, perineum, scrotum  constipation: Yes, usually very regular , skipping days and different times and amts; no blood   dysuria: Yes  headaches: Yes  nausea: Yes- emesis 1st day  Aggravated by: certain positions, coughing, palpation  Relieved by: certain positions, recumbency  Pain severity: moderate  Treatments tried: acetaminophen, antibiotics  Improvement on treatment: no relief  abdominal surgery: Yes  colon cancer: No  Crohn's disease: No  gallstones: Yes  GERD: Yes  irritable bowel syndrome: No  kidney stones: Yes  pancreatitis: No  PUD: No  ulcerative colitis: No  UTI: No    HTN, tx   BP elevated upon arrival, but improved     MEDCARD:Reviewed  ROS:  No fever, chills , or night sweats  No dysphagia or early satiety  No chest pain or palpitations  No cough or wheezing  No blood in stool or urine  No dysuria or nocturia    Remainder of review negative except as previously noted    PMHX: Reviewed  PSHX: Reviewed  SHX: Reviewed  FHX: Reviewed    PE:  VSS  GEN: WDWN, A&O, NAD, conversant and co-operative; pleasant as always  EYES: Conj/lids unremarkable, sclera anicteric   RESP: Efforts unlabored, lungs CTAP  CV: Heart RRR, no carotid bruits, 1+ pedal pulses, no LE edema  GI:BS+. Soft, mild diffuse tenderness, supra public > other areas  neg percussion , no rebound  -HSM noted  MSK: Gait normal. No CCE noted, No CVAT noted  NEURO:  DELILAH. No tremor noted  SKIN: Warm and dry    IMPRESSION:  Prostatitis  Abdominal pain/Bloating- may be related to antibiotics  S/p abdominal surgery x 2  HTN, stable    PLAN:  Xray flat and upright  Lab-CBC and CMP  Complete Cipro  Probiotics- add supplement to daily yogurt  [place] diet  Vigorous hydration water best  Call prn

## 2019-10-24 ENCOUNTER — TELEPHONE (OUTPATIENT)
Dept: INTERNAL MEDICINE | Facility: CLINIC | Age: 29
End: 2019-10-24

## 2019-10-24 NOTE — TELEPHONE ENCOUNTER
----- Message from Kimi Valladares MD sent at 10/23/2019  5:26 PM CDT -----  Please note that your abdominal xray did not reveal any worrisome findings.  Please do not hesitate to call/email if you have any questions or concerns  Kimi Aguila

## 2019-10-24 NOTE — TELEPHONE ENCOUNTER
----- Message from Kimi Valladares MD sent at 10/24/2019 12:26 PM CDT -----  Please note that your blood work did not reveal the etiology of your abdominal pain.  The kidney and liver tests were normal  And your white blood cells (WBC's) , that reflect infection in the body were normal.  Please do not hesitate to call/email if you have any questions or concerns  And let us know if your symptoms do not resolve with the antibiotic course.  Kimi Aguila

## 2019-11-05 ENCOUNTER — PATIENT MESSAGE (OUTPATIENT)
Dept: INTERNAL MEDICINE | Facility: CLINIC | Age: 29
End: 2019-11-05

## 2019-11-05 DIAGNOSIS — R10.9 ABDOMINAL PAIN, UNSPECIFIED ABDOMINAL LOCATION: Primary | ICD-10-CM

## 2019-11-08 ENCOUNTER — PATIENT OUTREACH (OUTPATIENT)
Dept: ADMINISTRATIVE | Facility: OTHER | Age: 29
End: 2019-11-08

## 2019-11-12 ENCOUNTER — OFFICE VISIT (OUTPATIENT)
Dept: GASTROENTEROLOGY | Facility: CLINIC | Age: 29
End: 2019-11-12
Payer: COMMERCIAL

## 2019-11-12 DIAGNOSIS — R14.0 BLOATING: Primary | ICD-10-CM

## 2019-11-12 PROCEDURE — 99204 PR OFFICE/OUTPT VISIT, NEW, LEVL IV, 45-59 MIN: ICD-10-PCS | Mod: S$GLB,,, | Performed by: INTERNAL MEDICINE

## 2019-11-12 PROCEDURE — 99204 OFFICE O/P NEW MOD 45 MIN: CPT | Mod: S$GLB,,, | Performed by: INTERNAL MEDICINE

## 2019-11-12 PROCEDURE — 99999 PR PBB SHADOW E&M-EST. PATIENT-LVL II: CPT | Mod: PBBFAC,,, | Performed by: INTERNAL MEDICINE

## 2019-11-12 PROCEDURE — 99999 PR PBB SHADOW E&M-EST. PATIENT-LVL II: ICD-10-PCS | Mod: PBBFAC,,, | Performed by: INTERNAL MEDICINE

## 2019-11-12 RX ORDER — DICYCLOMINE HYDROCHLORIDE 10 MG/1
10 CAPSULE ORAL 4 TIMES DAILY PRN
Qty: 120 CAPSULE | Refills: 3 | Status: SHIPPED | OUTPATIENT
Start: 2019-11-12 | End: 2019-12-12

## 2019-11-12 NOTE — LETTER
November 12, 2019      Kimi Valladares MD  [address] [facility] [place] [address]           [facility]  [address] [place], [place] [address], [address]  [facility] [address]  Phone: [phone]  Fax: [phone]          Patient: Donal Serrano   MR Number: [id number]   YOB: 1990   Date of Visit: 11/12/2019       Dear Dr. Kimi Valladares:    Thank you for referring Donal Serrano to me for evaluation. Attached you will find relevant portions of my assessment and plan of care.    If you have questions, please do not hesitate to call me. I look forward to following Donal Serrano along with you.    Sincerely,    Zi Clement MD    Enclosure  CC:  No Recipients    If you would like to receive this communication electronically, please contact [email or web address] or [phone] to request more information on [employer] access.    For providers and/or their staff who would like to refer a patient to [facility], please contact us through our one-stop-shop provider referral line, [facility], at [phone].    If you feel you have received this communication in error or would no longer like to receive these types of communications, please e-mail [email or web address]

## 2019-11-12 NOTE — PROGRESS NOTES
"Subjective:       Patient ID: Donal Serrano is a 29 y.o. male.    Chief Complaint: Follow-up and Abdominal Cramping    Patient here today to establish care with aforementioned complaints.  Patient reports his symptoms began following a back injury few months ago.  He was taking NSAIDs regularly however a few weeks ago began experiencing abdominal pain and rectal fullness".  He presented the urgent care and was diagnosed with prostatitis start with antibiotics.  He subsequently developed diarrhea so, attributing it to his recent course of antibiotics, began taking probiotics which messed up (his) entire GI tract.  He did stop his antibiotics and diarrhea improved.  However, patient reports his bloating has persisted.  He characterizes complaints as fullness...  Along (his) belt line".  Complaints occur daily and are worse in the evening.  There not modified by eating or having a bowel movement.  He denies associated diarrhea or constipation. Denies associated weight loss.    Patient follows with hepatology, re-abnormal liver test.  Fiber scan in 2018 was without significant fibrosis or steatosis.  His father had liver cancer due to KHALIL.    History of cholecystectomy and appendectomy    CT abdomen pelvis 05/2019  Impression      No acute findings in the abdomen or pelvis in this patient status post appendectomy.      Past Medical History:   Diagnosis Date    Keloid cicatrix     Kidney stones        Past Surgical History:   Procedure Laterality Date    APPENDECTOMY      CHOLECYSTECTOMY  03/2012    LAPAROSCOPIC APPENDECTOMY N/A 8/20/2018    Procedure: APPENDECTOMY, LAPAROSCOPIC;  Surgeon: Saulo Powell MD;  Location: [facility] OR [address] [place] [address];  Service: General;  Laterality: N/A;       Social History  Social History     Tobacco Use    Smoking status: Never Smoker    Smokeless tobacco: Never Used   Substance Use Topics    Alcohol use: No     Frequency: Monthly or less     Drinks per session: 1 or 2    "  Binge frequency: Never    Drug use: No       Family History   Problem Relation Age of Onset    Skin cancer Maternal Grandfather     Melanoma Maternal Grandfather     Hypertension Mother     Cancer Father         Liver    Cirrhosis Father     Skin cancer Paternal Grandfather     Liver disease Paternal Grandfather     Heart disease Maternal Uncle     Hypertension Maternal Grandmother     No Known Problems Paternal Grandmother     Stroke Neg Hx        Review of Systems   Constitutional: Negative for chills, fever and unexpected weight change.   HENT: Negative for congestion and trouble swallowing.    Eyes: Negative for photophobia and visual disturbance.   Respiratory: Negative for cough and shortness of breath.    Cardiovascular: Negative for chest pain and leg swelling.   Gastrointestinal: Positive for abdominal distention and abdominal pain. Negative for blood in stool, constipation, diarrhea, nausea and vomiting.   Genitourinary: Negative for dysuria and hematuria.   Musculoskeletal: Negative for arthralgias and myalgias.   Skin: Negative for color change and rash.   Neurological: Negative for dizziness, light-headedness and numbness.   Psychiatric/Behavioral: Negative for agitation and confusion.           Objective:      Physical Exam   Constitutional: He is oriented to person, place, and time. He appears well-developed and well-nourished.   HENT:   Head: Normocephalic and atraumatic.   Eyes: Pupils are equal, round, and reactive to light. EOM are normal. No scleral icterus.   Neck: Normal range of motion. Neck supple.   Cardiovascular: Normal rate, regular rhythm, normal heart sounds and intact distal pulses.   Pulmonary/Chest: Effort normal and breath sounds normal. No respiratory distress.   Abdominal: Soft. Bowel sounds are normal. He exhibits no distension. There is no tenderness.   Musculoskeletal: Normal range of motion. He exhibits no edema.   Neurological: He is alert and oriented to person,  place, and time.   No asterixis   Skin: Skin is warm and dry. No rash noted. No erythema.   Psychiatric: He has a normal mood and affect. His behavior is normal.   Nursing note and vitals reviewed.        Pertinent labs and imaging studies reviewed    Assessment:       1. Bloating        Plan:       Trial of Bentyl  If no improvement in 6-8 weeks will consider EGD with random gastric biopsies    (Portions of this note were dictated using voice recognition software and may contain dictation related errors in spelling/grammar/syntax not found on text review)

## 2019-11-13 ENCOUNTER — OFFICE VISIT (OUTPATIENT)
Dept: UROLOGY | Facility: CLINIC | Age: 29
End: 2019-11-13
Attending: UROLOGY
Payer: COMMERCIAL

## 2019-11-13 VITALS
DIASTOLIC BLOOD PRESSURE: 96 MMHG | WEIGHT: 175 LBS | BODY MASS INDEX: 27.47 KG/M2 | HEIGHT: 67 IN | HEART RATE: 80 BPM | SYSTOLIC BLOOD PRESSURE: 136 MMHG

## 2019-11-13 DIAGNOSIS — R10.2 PELVIC PAIN: Primary | ICD-10-CM

## 2019-11-13 PROCEDURE — 99244 PR OFFICE CONSULTATION,LEVEL IV: ICD-10-PCS | Mod: S$GLB,,, | Performed by: UROLOGY

## 2019-11-13 PROCEDURE — 99244 OFF/OP CNSLTJ NEW/EST MOD 40: CPT | Mod: S$GLB,,, | Performed by: UROLOGY

## 2019-11-13 NOTE — PROGRESS NOTES
"Subjective:      Donal Serrano is a 29 y.o. male who was referred by Dr. Valladares for evaluation of bilateral, lower abd pain.    Patient reports his symptoms began following a back injury few months ago.  He was taking NSAIDs regularly however a few weeks ago began experiencing abdominal pain and rectal fullness".  He presented the urgent care on 10/20 and was diagnosed with prostatitis. Per pt-DREwas difficult due to hemorrhoids. Pt completed two weeks course of Cipro yesterday. There has been no change in abd pain. Complaints occur daily and are worse in the evening.  There not modified by activity, eating or having a bowel movement.  He denies associated diarrhea or constipation. Denies associated weight loss.  He has been seen by his PCP, as well as GI yesterday for bloating and this unresolved pain.     He denies urinary symptoms; no dysuria, hematuria, frequency, urgency, or hesitancy.   He reports passing a kidney stone years ago. He denies flank pain. He denies fever/chills/N/V.     The following portions of the patient's history were reviewed and updated as appropriate: allergies, current medications, past family history, past medical history, past social history, past surgical history and problem list.    Review of Systems  Constitutional: no fever or chills  ENT: no nasal congestion or sore throat  Respiratory: no cough or shortness of breath  Cardiovascular: no chest pain or palpitations  Gastrointestinal: no nausea or vomiting, tolerating diet  Genitourinary: as per HPI  Hematologic/Lymphatic: no easy bruising or lymphadenopathy  Musculoskeletal: no arthralgias or myalgias  Neurological: no seizures or tremors  Behavioral/Psych: no auditory or visual hallucinations     Objective:   Vitals: BP (!) 136/96   Pulse 80   Ht 5' 7" (1.702 m)   Wt 79.4 kg (175 lb)   BMI 27.41 kg/m²     Physical Exam   General: alert and oriented, no acute distress  Head: normocephalic, atraumatic  Neck: " supple, no lymphadenopathy, normal ROM, no masses  Respiratory: Symmetric expansion, non-labored breathing  Cardiovascular: regular rate and rhythm, nomal pulses, no peripheral edema  Abdomen: soft, non tender, mildly distended, no palpable masses, no hernias, no hepatomegaly or splenomegaly  Lymphatic: no inguinal nodes  Skin: normal coloration and turgor, no rashes, no suspicious skin lesions noted  Neuro: alert and oriented x3, no gross deficits  Psych: normal judgment and insight, normal mood/affect and non-anxious    Lab Review   Urinalysis demonstrates negative for all components  Lab Results   Component Value Date    WBC 8.52 10/23/2019    HGB 14.8 10/23/2019    HCT 44.6 10/23/2019    MCV 87 10/23/2019     10/23/2019     Lab Results   Component Value Date    CREATININE 1.1 10/23/2019    BUN 13 10/23/2019     No results found for: PSA     Imaging   Impression       No acute findings in the abdomen or pelvis in this patient status post appendectomy.    Electronically signed by resident: Tiburcio Castillo  Date: 05/06/2019  Time: 13:42    Electronically signed by: Joby Gibson MD  Date: 05/06/2019  Time: 14:23         Assessment:     1. Pelvic pain         Plan:   -F/U with PCP/GI   -RTC as needed for urologic concerns.

## 2019-11-14 ENCOUNTER — IMMUNIZATION (OUTPATIENT)
Dept: PHARMACY | Facility: CLINIC | Age: 29
End: 2019-11-14
Payer: COMMERCIAL

## 2020-02-17 RX ORDER — IRBESARTAN 150 MG/1
150 TABLET ORAL NIGHTLY
Qty: 90 TABLET | Refills: 3 | Status: SHIPPED | OUTPATIENT
Start: 2020-02-17 | End: 2021-01-30 | Stop reason: SDUPTHER

## 2020-03-02 NOTE — PROGRESS NOTES
31 yo male w/ HTN presents for chest pain  HTN,  tx irbesartan 150mg  BP @ home 120-130/upper 80's   and lower 90's    Answers for HPI/ROS submitted by the patient on 2/26/2020   Chest pain, mid sternum w/ radiation to back  Chronicity: new  Onset: in the past 7 -10 days  Frequency: 2 to 4 times per day  Progression since onset: waxing and waning  Pain location: thoracic spine  Pain quality: aching  Pain - numeric: 3/10  Pain is: the same all the time  Stiffness is present: all day  chest pain: Yes  tingling: Yes  Risk factors: history of renal stones  Pain severity: mild  Treatments tried: NSAIDs, home exercises, walking  Improvement on treatment: no relief  Prilosec x several days, may be less intense  But still aches  Feels on exhalation, may be more intense after eating      MEDCARD: Reviewed  ROS:  No fever, chills, or night sweats  No sinus or ear pain or pressure  No decongestants or excessive caffeine intake  No dysphagia or early satiety  No PND or orthopnea  No cough or wheezing  No HA or focal deficits  Remainder of review negative except as previously noted    PMHX:Reviewed  SHX:Reviewed  FHX:Reviewed    PE:  VSS:  GEN: WDWN, A&O, NAD, conversant and co-operative  EYES: Conj/lids unremarkable, sclera anicteric, pupils reactive  ENT: O/P w/o lesions or exudate;  Sinus nontender  NECK: Supple w/o lymphadenopathy or thyromegaly  RESP: Efforts unlabored; lungs CTA  CV: Heart RRR, no carotid bruits noted, 1+ pedal pulse , no LE edema  Mild chest wall discomfort w/ pressure over the sternum  GI: BS+, soft , + mid epigastric discomfort, no HSM noted  MSK: Gait normal, no CCE, No CVAT  NEURO: MAZARIEGOS. No tremor  SKIN: Warm and dry    IMPRESSION:  Gastritis  HTN, elevated diastolic  Chest pain    PLAN:  EKG-   2D echo  PPI x 2 weeks  Gaviscon hs  Avoid fried, spicy, acidic foods  Avoid caffeine  Avoid carbonated beverages  Avoid ETOH  Avoid large meals  Avoid eating w/i 2 hrs of hs or exercise  Small, frequent meals  best  Clear liquids w/o carbonation   [place] foods are better tolerated  Consider GI if sx persist or exacebate  Caution w/gym activities  Call prn

## 2020-03-04 ENCOUNTER — OFFICE VISIT (OUTPATIENT)
Dept: INTERNAL MEDICINE | Facility: CLINIC | Age: 30
End: 2020-03-04
Payer: COMMERCIAL

## 2020-03-04 VITALS
WEIGHT: 181 LBS | RESPIRATION RATE: 16 BRPM | DIASTOLIC BLOOD PRESSURE: 90 MMHG | SYSTOLIC BLOOD PRESSURE: 114 MMHG | HEART RATE: 76 BPM | TEMPERATURE: 98 F | BODY MASS INDEX: 28.41 KG/M2 | HEIGHT: 67 IN

## 2020-03-04 DIAGNOSIS — I10 HYPERTENSION, UNSPECIFIED TYPE: ICD-10-CM

## 2020-03-04 DIAGNOSIS — K29.70 GASTRITIS, PRESENCE OF BLEEDING UNSPECIFIED, UNSPECIFIED CHRONICITY, UNSPECIFIED GASTRITIS TYPE: ICD-10-CM

## 2020-03-04 DIAGNOSIS — R07.9 CHEST PAIN, UNSPECIFIED TYPE: ICD-10-CM

## 2020-03-04 PROCEDURE — 99999 PR PBB SHADOW E&M-EST. PATIENT-LVL III: ICD-10-PCS | Mod: PBBFAC,,, | Performed by: INTERNAL MEDICINE

## 2020-03-04 PROCEDURE — 3080F PR MOST RECENT DIASTOLIC BLOOD PRESSURE >= 90 MM HG: ICD-10-PCS | Mod: CPTII,S$GLB,, | Performed by: INTERNAL MEDICINE

## 2020-03-04 PROCEDURE — 93005 ELECTROCARDIOGRAM TRACING: CPT | Mod: S$GLB,,, | Performed by: INTERNAL MEDICINE

## 2020-03-04 PROCEDURE — 3008F PR BODY MASS INDEX (BMI) DOCUMENTED: ICD-10-PCS | Mod: CPTII,S$GLB,, | Performed by: INTERNAL MEDICINE

## 2020-03-04 PROCEDURE — 3074F PR MOST RECENT SYSTOLIC BLOOD PRESSURE < 130 MM HG: ICD-10-PCS | Mod: CPTII,S$GLB,, | Performed by: INTERNAL MEDICINE

## 2020-03-04 PROCEDURE — 93010 ELECTROCARDIOGRAM REPORT: CPT | Mod: S$GLB,,, | Performed by: INTERNAL MEDICINE

## 2020-03-04 PROCEDURE — 3080F DIAST BP >= 90 MM HG: CPT | Mod: CPTII,S$GLB,, | Performed by: INTERNAL MEDICINE

## 2020-03-04 PROCEDURE — 3008F BODY MASS INDEX DOCD: CPT | Mod: CPTII,S$GLB,, | Performed by: INTERNAL MEDICINE

## 2020-03-04 PROCEDURE — 99214 PR OFFICE/OUTPT VISIT, EST, LEVL IV, 30-39 MIN: ICD-10-PCS | Mod: S$GLB,,, | Performed by: INTERNAL MEDICINE

## 2020-03-04 PROCEDURE — 3074F SYST BP LT 130 MM HG: CPT | Mod: CPTII,S$GLB,, | Performed by: INTERNAL MEDICINE

## 2020-03-04 PROCEDURE — 93005 EKG 12-LEAD: ICD-10-PCS | Mod: S$GLB,,, | Performed by: INTERNAL MEDICINE

## 2020-03-04 PROCEDURE — 99999 PR PBB SHADOW E&M-EST. PATIENT-LVL III: CPT | Mod: PBBFAC,,, | Performed by: INTERNAL MEDICINE

## 2020-03-04 PROCEDURE — 93010 EKG 12-LEAD: ICD-10-PCS | Mod: S$GLB,,, | Performed by: INTERNAL MEDICINE

## 2020-03-04 PROCEDURE — 99214 OFFICE O/P EST MOD 30 MIN: CPT | Mod: S$GLB,,, | Performed by: INTERNAL MEDICINE

## 2020-03-04 RX ORDER — OMEPRAZOLE 20 MG/1
20 CAPSULE, DELAYED RELEASE ORAL DAILY
COMMUNITY
End: 2020-07-27

## 2020-03-12 ENCOUNTER — CLINICAL SUPPORT (OUTPATIENT)
Dept: CARDIOLOGY | Facility: CLINIC | Age: 30
End: 2020-03-12
Attending: INTERNAL MEDICINE
Payer: COMMERCIAL

## 2020-03-12 VITALS
HEART RATE: 88 BPM | SYSTOLIC BLOOD PRESSURE: 128 MMHG | DIASTOLIC BLOOD PRESSURE: 80 MMHG | WEIGHT: 181 LBS | BODY MASS INDEX: 28.41 KG/M2 | HEIGHT: 67 IN

## 2020-03-12 DIAGNOSIS — I10 HYPERTENSION, UNSPECIFIED TYPE: ICD-10-CM

## 2020-03-12 DIAGNOSIS — R07.9 CHEST PAIN, UNSPECIFIED TYPE: ICD-10-CM

## 2020-03-12 LAB
ASCENDING AORTA: 2.52 CM
AV INDEX (PROSTH): 0.82
AV MEAN GRADIENT: 4 MMHG
AV PEAK GRADIENT: 6 MMHG
AV VALVE AREA: 2.49 CM2
AV VELOCITY RATIO: 0.85
BSA FOR ECHO PROCEDURE: 1.97 M2
CV ECHO LV RWT: 0.33 CM
DOP CALC AO PEAK VEL: 1.24 M/S
DOP CALC AO VTI: 25.51 CM
DOP CALC LVOT AREA: 3 CM2
DOP CALC LVOT DIAMETER: 1.97 CM
DOP CALC LVOT PEAK VEL: 1.06 M/S
DOP CALC LVOT STROKE VOLUME: 63.55 CM3
DOP CALCLVOT PEAK VEL VTI: 20.86 CM
E WAVE DECELERATION TIME: 169.79 MSEC
E/A RATIO: 1.92
E/E' RATIO: 6.86 M/S
ECHO LV POSTERIOR WALL: 0.79 CM (ref 0.6–1.1)
FRACTIONAL SHORTENING: 32 % (ref 28–44)
INTERVENTRICULAR SEPTUM: 0.8 CM (ref 0.6–1.1)
IVRT: 88.49 MSEC
LA MAJOR: 4.41 CM
LA MINOR: 4.62 CM
LA WIDTH: 3.17 CM
LEFT ATRIUM SIZE: 3.39 CM
LEFT ATRIUM VOLUME INDEX: 21.3 ML/M2
LEFT ATRIUM VOLUME: 41.22 CM3
LEFT INTERNAL DIMENSION IN SYSTOLE: 3.26 CM (ref 2.1–4)
LEFT VENTRICLE DIASTOLIC VOLUME INDEX: 54.87 ML/M2
LEFT VENTRICLE DIASTOLIC VOLUME: 106.35 ML
LEFT VENTRICLE MASS INDEX: 64 G/M2
LEFT VENTRICLE SYSTOLIC VOLUME INDEX: 22.2 ML/M2
LEFT VENTRICLE SYSTOLIC VOLUME: 42.96 ML
LEFT VENTRICULAR INTERNAL DIMENSION IN DIASTOLE: 4.78 CM (ref 3.5–6)
LEFT VENTRICULAR MASS: 124.78 G
LV LATERAL E/E' RATIO: 6 M/S
LV SEPTAL E/E' RATIO: 8 M/S
MV PEAK A VEL: 0.5 M/S
MV PEAK E VEL: 0.96 M/S
PISA TR MAX VEL: 2.17 M/S
PULM VEIN S/D RATIO: 0.89
PV PEAK D VEL: 0.55 M/S
PV PEAK S VEL: 0.49 M/S
RA MAJOR: 4.27 CM
RA PRESSURE: 3 MMHG
RA WIDTH: 2.87 CM
RIGHT VENTRICULAR END-DIASTOLIC DIMENSION: 2.66 CM
SINUS: 2.75 CM
STJ: 2.41 CM
TDI LATERAL: 0.16 M/S
TDI SEPTAL: 0.12 M/S
TDI: 0.14 M/S
TR MAX PG: 19 MMHG
TRICUSPID ANNULAR PLANE SYSTOLIC EXCURSION: 1.47 CM
TV REST PULMONARY ARTERY PRESSURE: 22 MMHG

## 2020-03-12 PROCEDURE — 99999 PR PBB SHADOW E&M-EST. PATIENT-LVL II: CPT | Mod: PBBFAC,,,

## 2020-03-12 PROCEDURE — 99999 PR PBB SHADOW E&M-EST. PATIENT-LVL II: ICD-10-PCS | Mod: PBBFAC,,,

## 2020-03-12 PROCEDURE — 93306 TTE W/DOPPLER COMPLETE: CPT | Mod: S$GLB,,, | Performed by: INTERNAL MEDICINE

## 2020-03-12 PROCEDURE — 93306 ECHO (CUPID ONLY): ICD-10-PCS | Mod: S$GLB,,, | Performed by: INTERNAL MEDICINE

## 2020-03-13 ENCOUNTER — TELEPHONE (OUTPATIENT)
Dept: INTERNAL MEDICINE | Facility: CLINIC | Age: 30
End: 2020-03-13

## 2020-03-13 NOTE — TELEPHONE ENCOUNTER
----- Message from Kimi Valladares MD sent at 3/12/2020  4:43 PM CDT -----  Donal, please note that your heart echo was unremarkable  Please do not hesitate to call/email if you have any questions or concerns  Kimi Aguila

## 2020-05-29 ENCOUNTER — OFFICE VISIT (OUTPATIENT)
Dept: OTOLARYNGOLOGY | Facility: CLINIC | Age: 30
End: 2020-05-29
Payer: COMMERCIAL

## 2020-05-29 VITALS — HEART RATE: 81 BPM | SYSTOLIC BLOOD PRESSURE: 131 MMHG | DIASTOLIC BLOOD PRESSURE: 87 MMHG

## 2020-05-29 DIAGNOSIS — H92.03 OTALGIA OF BOTH EARS: Primary | ICD-10-CM

## 2020-05-29 PROCEDURE — 3079F DIAST BP 80-89 MM HG: CPT | Mod: CPTII,S$GLB,, | Performed by: NURSE PRACTITIONER

## 2020-05-29 PROCEDURE — 99202 PR OFFICE/OUTPT VISIT, NEW, LEVL II, 15-29 MIN: ICD-10-PCS | Mod: S$GLB,,, | Performed by: NURSE PRACTITIONER

## 2020-05-29 PROCEDURE — 99202 OFFICE O/P NEW SF 15 MIN: CPT | Mod: S$GLB,,, | Performed by: NURSE PRACTITIONER

## 2020-05-29 PROCEDURE — 3079F PR MOST RECENT DIASTOLIC BLOOD PRESSURE 80-89 MM HG: ICD-10-PCS | Mod: CPTII,S$GLB,, | Performed by: NURSE PRACTITIONER

## 2020-05-29 PROCEDURE — 99999 PR PBB SHADOW E&M-EST. PATIENT-LVL II: CPT | Mod: PBBFAC,,, | Performed by: NURSE PRACTITIONER

## 2020-05-29 PROCEDURE — 99999 PR PBB SHADOW E&M-EST. PATIENT-LVL II: ICD-10-PCS | Mod: PBBFAC,,, | Performed by: NURSE PRACTITIONER

## 2020-05-29 PROCEDURE — 3075F PR MOST RECENT SYSTOLIC BLOOD PRESS GE 130-139MM HG: ICD-10-PCS | Mod: CPTII,S$GLB,, | Performed by: NURSE PRACTITIONER

## 2020-05-29 PROCEDURE — 3075F SYST BP GE 130 - 139MM HG: CPT | Mod: CPTII,S$GLB,, | Performed by: NURSE PRACTITIONER

## 2020-05-29 NOTE — PROGRESS NOTES
Subjective:      Donal Serrano is a 30 y.o. male who was self-referred for ear pain.    Mr. Serrano reports bilateral ear pain for the past 2 weeks after having his ears irrigated in urgent care. He rates his pain 3-4/10. He denies change in hearing, dizziness, or tinnitus. He denies ear drainage. He initially had soreness around ear that has now improved.  There is not a family history of hearing loss at a young age.  There is not a prior history of ear surgery.  There is not a prior history of ear infections .  He denies a history of significant noise exposure.      Past Medical History  He has a past medical history of Keloid cicatrix and Kidney stones.    Past Surgical History  He has a past surgical history that includes Cholecystectomy (03/2012); Laparoscopic appendectomy (N/A, 8/20/2018); and Appendectomy.    Family History  His family history includes Cancer in his father; Cirrhosis in his father; Heart disease in his maternal uncle; Hypertension in his maternal grandmother and mother; Liver disease in his paternal grandfather; Melanoma in his maternal grandfather; No Known Problems in his paternal grandmother; Skin cancer in his maternal grandfather and paternal grandfather.    Social History  He reports that he has never smoked. He has never used smokeless tobacco. He reports that he does not drink alcohol or use drugs.    Allergies  He is allergic to amoxicillin and zithromax [azithromycin].    Medications  He has a current medication list which includes the following prescription(s): irbesartan and omeprazole.    Review of Systems   Constitutional: Negative for chills, fever and unexpected weight change.   HENT: Positive for ear pain. Negative for ear discharge, hearing loss, sore throat, tinnitus and trouble swallowing.    Eyes: Negative for pain and visual disturbance.   Respiratory: Negative for apnea and shortness of breath.    Cardiovascular: Negative for chest pain and palpitations.    Gastrointestinal: Negative for abdominal pain and nausea.   Endocrine: Negative for cold intolerance and heat intolerance.   Musculoskeletal: Negative for joint swelling and neck stiffness.   Skin: Negative for color change and rash.   Neurological: Negative for dizziness, facial asymmetry and headaches.   Hematological: Negative for adenopathy. Does not bruise/bleed easily.   Psychiatric/Behavioral: Negative for agitation and sleep disturbance. The patient is not nervous/anxious.           Objective:     /87   Pulse 81      Constitutional:   Vital signs are normal. He appears well-developed and well-nourished.     Head:  Normocephalic and atraumatic.     Ears:    Right Ear: No lacerations. No drainage, swelling or tenderness. No foreign bodies. No mastoid tenderness. Tympanic membrane is not injected, not scarred, not perforated, not erythematous, not retracted and not bulging. Tympanic membrane mobility is normal. No middle ear effusion. No hemotympanum.   Left Ear: No lacerations. No drainage, swelling or tenderness. No foreign bodies. No mastoid tenderness. Tympanic membrane is not injected, not scarred, not perforated, not erythematous, not retracted and not bulging. Tympanic membrane mobility is normal.  No middle ear effusion. No hemotympanum.     Neck:  Neck normal without thyromegaly masses, asymmetry, normal tracheal structure, crepitus, and tenderness and no adenopathy.     Psychiatric:   He has a normal mood and affect.       Procedure    None        Data Reviewed    WBC (K/uL)   Date Value   10/23/2019 8.52     Platelets (K/uL)   Date Value   10/23/2019 201      Creatinine (mg/dL)   Date Value   10/23/2019 1.1     TSH (uIU/mL)   Date Value   01/21/2019 0.661     Glucose (mg/dL)   Date Value   10/23/2019 88     Hemoglobin A1C (%)   Date Value   06/07/2011 4.9          Assessment:     1. Otalgia of both ears         Plan:     I had a long discussion with the patient regarding his condition and  the further workup and management options.    There is no evidence of TM or EAC trauma or infection.   There is no change in hearing.  His ear tenderness is likely related to his recent ear irrigation, but is improved.  I recommend fluticasone 1-2 sprays each nostril daily for 2 weeks for possible ET inflammation.  I recommend Ibuprofen as needed for pain.    Follow up if symptoms worsen or fail to improve.

## 2020-06-15 ENCOUNTER — PATIENT MESSAGE (OUTPATIENT)
Dept: INTERNAL MEDICINE | Facility: CLINIC | Age: 30
End: 2020-06-15

## 2020-06-15 DIAGNOSIS — M54.2 NECK PAIN, MUSCULOSKELETAL: Primary | ICD-10-CM

## 2020-06-22 ENCOUNTER — TELEPHONE (OUTPATIENT)
Dept: SURGERY | Facility: CLINIC | Age: 30
End: 2020-06-22

## 2020-06-22 NOTE — TELEPHONE ENCOUNTER
[facility]  Informed her that the diltiazem can be changed to a cream instead of ointment.

## 2020-06-22 NOTE — TELEPHONE ENCOUNTER
----- Message from Candice Prescott sent at 6/22/2020  4:09 PM CDT -----  Regarding: Refills  Reason:  I have [facility] on the line they would like to know if there's any refills for the cream you called in for patient              Contact: [phone]

## 2020-07-27 ENCOUNTER — OFFICE VISIT (OUTPATIENT)
Dept: INTERNAL MEDICINE | Facility: CLINIC | Age: 30
End: 2020-07-27
Payer: COMMERCIAL

## 2020-07-27 VITALS
RESPIRATION RATE: 18 BRPM | DIASTOLIC BLOOD PRESSURE: 84 MMHG | HEART RATE: 82 BPM | OXYGEN SATURATION: 98 % | HEIGHT: 67 IN | BODY MASS INDEX: 28.86 KG/M2 | SYSTOLIC BLOOD PRESSURE: 132 MMHG | WEIGHT: 183.88 LBS | TEMPERATURE: 98 F

## 2020-07-27 DIAGNOSIS — R53.1 WEAKNESS: ICD-10-CM

## 2020-07-27 DIAGNOSIS — G43.009 MIGRAINE WITHOUT AURA AND WITHOUT STATUS MIGRAINOSUS, NOT INTRACTABLE: Primary | ICD-10-CM

## 2020-07-27 DIAGNOSIS — R51.9 INCREASED FREQUENCY OF HEADACHES: ICD-10-CM

## 2020-07-27 PROCEDURE — 3075F SYST BP GE 130 - 139MM HG: CPT | Mod: CPTII,S$GLB,, | Performed by: FAMILY MEDICINE

## 2020-07-27 PROCEDURE — 3075F PR MOST RECENT SYSTOLIC BLOOD PRESS GE 130-139MM HG: ICD-10-PCS | Mod: CPTII,S$GLB,, | Performed by: FAMILY MEDICINE

## 2020-07-27 PROCEDURE — 99214 PR OFFICE/OUTPT VISIT, EST, LEVL IV, 30-39 MIN: ICD-10-PCS | Mod: S$GLB,,, | Performed by: FAMILY MEDICINE

## 2020-07-27 PROCEDURE — 3008F PR BODY MASS INDEX (BMI) DOCUMENTED: ICD-10-PCS | Mod: CPTII,S$GLB,, | Performed by: FAMILY MEDICINE

## 2020-07-27 PROCEDURE — 99214 OFFICE O/P EST MOD 30 MIN: CPT | Mod: S$GLB,,, | Performed by: FAMILY MEDICINE

## 2020-07-27 PROCEDURE — 99999 PR PBB SHADOW E&M-EST. PATIENT-LVL V: ICD-10-PCS | Mod: PBBFAC,,, | Performed by: FAMILY MEDICINE

## 2020-07-27 PROCEDURE — 3008F BODY MASS INDEX DOCD: CPT | Mod: CPTII,S$GLB,, | Performed by: FAMILY MEDICINE

## 2020-07-27 PROCEDURE — 99999 PR PBB SHADOW E&M-EST. PATIENT-LVL V: CPT | Mod: PBBFAC,,, | Performed by: FAMILY MEDICINE

## 2020-07-27 PROCEDURE — 3079F PR MOST RECENT DIASTOLIC BLOOD PRESSURE 80-89 MM HG: ICD-10-PCS | Mod: CPTII,S$GLB,, | Performed by: FAMILY MEDICINE

## 2020-07-27 PROCEDURE — 3079F DIAST BP 80-89 MM HG: CPT | Mod: CPTII,S$GLB,, | Performed by: FAMILY MEDICINE

## 2020-07-27 RX ORDER — PROPRANOLOL HYDROCHLORIDE 20 MG/1
20 TABLET ORAL 2 TIMES DAILY
Qty: 60 TABLET | Refills: 11 | Status: SHIPPED | OUTPATIENT
Start: 2020-07-27 | End: 2021-02-05

## 2020-07-27 NOTE — PROGRESS NOTES
Subjective:       Patient ID: Donal Serrano is a 30 y.o. male.    Chief Complaint: Migraine (x 8wks)    HPI 30-year-old white male patient of Dr. Aguila presents to clinic today secondary to concerns of recurrent headaches for approximately 8 weeks.  He initially felt that his headaches were related to neck and upper back tension; therefore, he has been in physical therapy for the past 8 weeks with improvement of his neck pain but continues to note recurrent headaches with this many as 1 headache daily but on average approximately 3 headaches per week.  He reports that his headaches are bitemporal in nature with associated blurry vision and generalized weakness.  He denies any auras.  He does report occasional improvement of his headaches with use of Tylenol or naproxen.  He currently notes a headache of 3/10 intensity.  Review of Systems   Constitutional: Negative for activity change, appetite change, chills, fatigue, fever and unexpected weight change.   HENT: Negative for nasal congestion, ear pain, hearing loss, postnasal drip, rhinorrhea, sinus pressure/congestion, sore throat, tinnitus and trouble swallowing.    Eyes: Positive for visual disturbance. Negative for discharge, redness and itching.   Respiratory: Negative for cough, chest tightness, shortness of breath and wheezing.    Cardiovascular: Negative for chest pain and palpitations.   Gastrointestinal: Negative for abdominal pain, blood in stool, constipation, diarrhea, nausea and vomiting.   Endocrine: Negative for polydipsia and polyuria.   Genitourinary: Negative for decreased urine volume, difficulty urinating, dysuria, frequency, hematuria and urgency.   Musculoskeletal: Positive for neck pain. Negative for arthralgias, back pain, joint swelling, myalgias and neck stiffness.   Integumentary:  Negative for rash.   Neurological: Positive for weakness and headaches. Negative for dizziness and light-headedness.   Psychiatric/Behavioral:  Negative.  Negative for confusion and dysphoric mood.         Objective:      Physical Exam  Vitals signs and nursing note reviewed.   Constitutional:       General: He is not in acute distress.     Appearance: He is well-developed. He is not diaphoretic.   HENT:      Head: Normocephalic and atraumatic.      Right Ear: External ear normal.      Left Ear: External ear normal.      Nose: Nose normal.      Mouth/Throat:      Pharynx: No oropharyngeal exudate.   Eyes:      General: No scleral icterus.        Right eye: No discharge.         Left eye: No discharge.      Conjunctiva/sclera: Conjunctivae normal.      Pupils: Pupils are equal, round, and reactive to light.   Neck:      Musculoskeletal: Normal range of motion and neck supple.      Thyroid: No thyromegaly.      Vascular: No JVD.      Trachea: No tracheal deviation.   Cardiovascular:      Rate and Rhythm: Normal rate and regular rhythm.      Heart sounds: Normal heart sounds. No murmur. No friction rub. No gallop.    Pulmonary:      Effort: Pulmonary effort is normal. No respiratory distress.      Breath sounds: Normal breath sounds. No stridor. No wheezing or rales.   Abdominal:      General: Bowel sounds are normal. There is no distension.      Palpations: Abdomen is soft. There is no mass.      Tenderness: There is no abdominal tenderness. There is no guarding or rebound.   Musculoskeletal: Normal range of motion.         General: No tenderness.      Cervical back: He exhibits pain and spasm.   Lymphadenopathy:      Cervical: No cervical adenopathy.   Skin:     General: Skin is warm and dry.      Coloration: Skin is not pale.      Findings: No erythema or rash.   Neurological:      Mental Status: He is alert and oriented to person, place, and time.      Cranial Nerves: Cranial nerve deficit present.      Sensory: No sensory deficit.      Motor: No weakness.      Coordination: Coordination normal.      Gait: Gait normal.      Deep Tendon Reflexes: Reflexes  normal.   Psychiatric:         Behavior: Behavior normal.         Thought Content: Thought content normal.         Judgment: Judgment normal.         Assessment:       1. Migraine without aura and without status migrainosus, not intractable    2. Weakness    3. Increased frequency of headaches        Plan:       1.  Encourage continued physical therapy and neck stretches.  2.  Recommend CT of the head with and without contrast for further evaluation of recurrent headaches with increased frequency of headaches.  3.  Refer to neurology for further evaluation and treatment of recurrent headaches.  4.  Start propranolol 20 mg b.i.d..  5.  Return to clinic as needed if symptoms persist or worsen.

## 2020-07-28 ENCOUNTER — HOSPITAL ENCOUNTER (OUTPATIENT)
Dept: RADIOLOGY | Facility: HOSPITAL | Age: 30
Discharge: HOME | End: 2020-07-28
Attending: FAMILY MEDICINE
Payer: COMMERCIAL

## 2020-07-28 DIAGNOSIS — R53.1 WEAKNESS: ICD-10-CM

## 2020-07-28 DIAGNOSIS — R51.9 INCREASED FREQUENCY OF HEADACHES: ICD-10-CM

## 2020-07-28 DIAGNOSIS — G43.009 MIGRAINE WITHOUT AURA AND WITHOUT STATUS MIGRAINOSUS, NOT INTRACTABLE: ICD-10-CM

## 2020-07-28 PROCEDURE — 70450 CT HEAD/BRAIN W/O DYE: CPT | Mod: 26,,, | Performed by: RADIOLOGY

## 2020-07-28 PROCEDURE — 70450 CT HEAD/BRAIN W/O DYE: CPT | Mod: TC

## 2020-07-28 PROCEDURE — 70450 CT HEAD WITHOUT CONTRAST: ICD-10-PCS | Mod: 26,,, | Performed by: RADIOLOGY

## 2020-08-06 ENCOUNTER — OFFICE VISIT (OUTPATIENT)
Dept: NEUROLOGY | Facility: CLINIC | Age: 30
End: 2020-08-06
Payer: COMMERCIAL

## 2020-08-06 VITALS
HEART RATE: 97 BPM | SYSTOLIC BLOOD PRESSURE: 129 MMHG | WEIGHT: 179.56 LBS | DIASTOLIC BLOOD PRESSURE: 89 MMHG | BODY MASS INDEX: 28.12 KG/M2

## 2020-08-06 DIAGNOSIS — G44.209 TENSION HEADACHE: Primary | ICD-10-CM

## 2020-08-06 DIAGNOSIS — I10 ESSENTIAL HYPERTENSION: ICD-10-CM

## 2020-08-06 DIAGNOSIS — G43.009 MIGRAINE WITHOUT AURA AND WITHOUT STATUS MIGRAINOSUS, NOT INTRACTABLE: ICD-10-CM

## 2020-08-06 DIAGNOSIS — M54.81 BILATERAL OCCIPITAL NEURALGIA: ICD-10-CM

## 2020-08-06 PROCEDURE — 3008F BODY MASS INDEX DOCD: CPT | Mod: CPTII,S$GLB,, | Performed by: PHYSICIAN ASSISTANT

## 2020-08-06 PROCEDURE — 99999 PR PBB SHADOW E&M-EST. PATIENT-LVL IV: ICD-10-PCS | Mod: PBBFAC,,, | Performed by: PHYSICIAN ASSISTANT

## 2020-08-06 PROCEDURE — 99204 OFFICE O/P NEW MOD 45 MIN: CPT | Mod: S$GLB,,, | Performed by: PHYSICIAN ASSISTANT

## 2020-08-06 PROCEDURE — 3074F PR MOST RECENT SYSTOLIC BLOOD PRESSURE < 130 MM HG: ICD-10-PCS | Mod: CPTII,S$GLB,, | Performed by: PHYSICIAN ASSISTANT

## 2020-08-06 PROCEDURE — 3008F PR BODY MASS INDEX (BMI) DOCUMENTED: ICD-10-PCS | Mod: CPTII,S$GLB,, | Performed by: PHYSICIAN ASSISTANT

## 2020-08-06 PROCEDURE — 3074F SYST BP LT 130 MM HG: CPT | Mod: CPTII,S$GLB,, | Performed by: PHYSICIAN ASSISTANT

## 2020-08-06 PROCEDURE — 3079F DIAST BP 80-89 MM HG: CPT | Mod: CPTII,S$GLB,, | Performed by: PHYSICIAN ASSISTANT

## 2020-08-06 PROCEDURE — 3079F PR MOST RECENT DIASTOLIC BLOOD PRESSURE 80-89 MM HG: ICD-10-PCS | Mod: CPTII,S$GLB,, | Performed by: PHYSICIAN ASSISTANT

## 2020-08-06 PROCEDURE — 99999 PR PBB SHADOW E&M-EST. PATIENT-LVL IV: CPT | Mod: PBBFAC,,, | Performed by: PHYSICIAN ASSISTANT

## 2020-08-06 PROCEDURE — 99204 PR OFFICE/OUTPT VISIT, NEW, LEVL IV, 45-59 MIN: ICD-10-PCS | Mod: S$GLB,,, | Performed by: PHYSICIAN ASSISTANT

## 2020-08-06 RX ORDER — RIZATRIPTAN BENZOATE 5 MG/1
TABLET, ORALLY DISINTEGRATING ORAL
Qty: 12 TABLET | Refills: 3 | Status: SHIPPED | OUTPATIENT
Start: 2020-08-06 | End: 2021-02-05

## 2020-08-06 NOTE — PATIENT INSTRUCTIONS
Tylenol   325 to 650 mg every 4 to 6 hours as needed or 1 g every 6 hours as needed; maximum dose: 4 g/day       Ibuprofen:   200 to 800 mg 3 to 4 times daily; maximum: 3,200 mg/day     Naproxen:   250 to 500 mg up to twice a day. (maximum: 1,250 mg in 24 hours)      Supplements for Migraine:  1. Magnesium Oxide - 400mg by mouth daily  2. Riboflavin (Vitamin B2) - 400mg by mouth daily  3. Coenzyme Q10 - 200mg tablet by mouth daily    - Please download Migraine Buddy juan on phone and begin tracking your headaches       Sleep Hygiene:  1. Avoid watching TV, eating, and discussing emotional issues in bed. The bed should be used for sleep and sex only. If not, we can associate the bed with other activities and it often becomes difficult to fall asleep.  2. Minimize noise, light, and temperature extremes during sleep with ear plugs, window blinds, or an electric blanket or air conditioner. Even the slightest nighttime noises or luminescent lights can disrupt the quality of your sleep. Try to keep your bedroom at a comfortable temperature -- not too hot (above 75 degrees) or too cold (below 54 degrees).  3. Try not to drink fluids after 8 p.m. This may reduce awakenings due to urination.  4. Avoid naps, but if you do nap, make it no more than about 25 minutes about eight hours after you awake. But if you have problems falling asleep, then no naps for you.  5. Do not expose your self to bright light if you need to get up at night. Use a small night-light instead.  6. Nicotine is a stimulant and should be avoided particularly near bedtime and upon night awakenings. Having a smoke before bed, although it may feel relaxing, is actually putting a stimulant into your bloodstream.  7. Caffeine is also a stimulant and is present in coffee (100-200 mg), soda (50-75 mg), tea (50-75 mg), and various over-the-counter medications. Caffeine should be discontinued at least four to six hours before bedtime. If you consume large amounts  of caffeine and you cut your self off too quickly, beware; you may get headaches that could keep you awake.  8. Although alcohol is a depressant and may help you fall asleep, the subsequent metabolism that clears it from your body when you are sleeping causes a withdrawal syndrome. This withdrawal causes awakenings and is often associated with nightmares and sweats.  9. A light snack may be sleep-inducing, but a heavy meal too close to bedtime interferes with sleep. Stay away from protein and stick to carbohydrates or dairy products. Milk contains the amino acid L-tryptophan, which has been shown in research to help people go to sleep. So milk and cookies or crackers (without chocolate) may be useful and taste good as well.

## 2020-08-06 NOTE — LETTER
August 6, 2020      Gagan Nayak MD  [address] [facility] [place] [address]           [facility]  [address]  [place] [address]  Phone: [phone]  Fax: [phone]          Patient: Donal Serrano   MR Number: [id number]   YOB: 1990   Date of Visit: 8/6/2020       Dear Dr. Gagan Nayak:    Thank you for referring Donal Serrano to me for evaluation. Attached you will find relevant portions of my assessment and plan of care.    If you have questions, please do not hesitate to call me. I look forward to following Donal Serrano along with you.    Sincerely,    Oliva Stone PA-C    Enclosure  CC:  No Recipients    If you would like to receive this communication electronically, please contact [email or web address] or [phone] to request more information on [employer] Link access.    For providers and/or their staff who would like to refer a patient to [facility], please contact us through our one-stop-shop provider referral line, [facility] , at [phone].    If you feel you have received this communication in error or would no longer like to receive these types of communications, please e-mail [email or web address]

## 2020-08-06 NOTE — PROGRESS NOTES
New Patient     SUBJECTIVE:  Patient ID: Donal Serrano   MRN: [id number]  Referred By: Dr. Gagan Nayak  Chief Complaint: Headache      History of Present Illness:   30 y.o. male with a PMHx of HTN, kidney stones, who presents to clinic alone for evaluation of headaches.   PMHx negative for TBI, Meningitis, Aneurysms, asthma, GI bleed, osteoporosis, CAD/MI, CVA/TIA, DM, cancer  Family Hx Positive for mother with migraines      Headache History:  Previous Hx of HA - His mother states he had migraines while in elementary school. Since that time would have HA's occasionally from stress, dehydration, missing a meal, or poor sleep. Occurring once per month, mild to moderate in severity. Recalls frequent HA's in college. Easily relieved by otc analgesics, stretching, or drinking water. However they worsened approximately 3 months ago.   Location/Radiation - unilateral or bilateral occipitalis radiating forward to temporalis, could eventually radiate to become holocephalic, can also be unilateral R retro-orbital  Quality - dull ache, sharp and throbbing, pounding  Duration - 2 hours - 2 days  Intensity (range) - 1-8/10  Frequency - 10/30 ha days per month, 3/30 are debilitating  Triggers - stress, same position of head and neck for long period of time, poor sleep  Aggravating Factors - staying in same posture, light, noise,   Alleviating Factors - step away from work, rest, stretch, sleep  Recent Changes - no  Prodrome/Aura - no  Time of day of most headaches- afternoon  Sleep - no trouble sleeping  Last eye exam - many years    Associated symptoms with the headache: trouble focusing and concentrating, photophobia, phonophobia, irritable    Treatments Tried   Propranolol  naproxen  PT    Social History  Alcohol - 1 drink per week  Smoke - denies  Recreational Drug Use- denies    Current Medications:    Current Outpatient Medications:     irbesartan (AVAPRO) 150 MG tablet, Take 1 tablet (150 mg total) by mouth  every evening., Disp: 90 tablet, Rfl: 3    propranoloL (INDERAL) 20 MG tablet, Take 1 tablet (20 mg total) by mouth 2 (two) times daily., Disp: 60 tablet, Rfl: 11    rizatriptan (MAXALT-MLT) 5 MG disintegrating tablet, Take at onset of migraine, can repeat in 2 hrs if needed.  No more than 2 tabs per day or 3 days/wk., Disp: 12 tablet, Rfl: 3    Review of Systems - as per HPI, otherwise a balanced 10 systems review is negative.    OBJECTIVE:  Vitals:  /89   Pulse 97   Wt 81.4 kg (179 lb 9 oz)   BMI 28.12 kg/m²     Physical Exam   Constitutional: he appears well-developed and well-nourished. he is well groomed. NAD  HENT:    Head: Normocephalic and atraumatic, Frontalis was NTTP, temporalis was NTTP   Eyes: Conjunctivae and EOM are normal. Pupils are equal, round, and reactive to light   Neck: Neck supple. Occiput and trapezius TTP, traps tight   Musculoskeletal: Normal range of motion. No joint stiffness. No vertebral point tenderness.  Skin: Skin is warm and dry.  Psychiatric: Normal mood and affect.     Neuro exam:    Mental status:  The patient is alert and oriented to person, place and time.  Language is intact and fluent  Remote and recent memory are intact  Normal attention and concentration  Mood is stable    Cranial Nerves:  Fundoscopic examination does not reveal any occult papilledema.    Pupils are equal and reactive to light.    Extraocular movements are intact and without nystagmus.    Visual fields are full to confrontation testing.   Facial movement is symmetric.  Facial sensation is intact.    Hearing is intact to finger rub   Limited ROM of neck in all (6) directions without pain  Shoulder shrug symmetrical.    Coordination:     Finger to nose - normal and symmetric bilaterally   Heel to shin test - normal and symmetric bilaterally     Motor:  Normal muscle bulk and symmetry. No fasciculations were noted.   Tremor not apparent   Pronator drift not apparent.    strength was strong and  symmetric  Finger extension strength was strong and symmetric  RUE:appropriate against gravity and medium force as tested 5/5  LUE: appropriate against gravity and medium force as tested 5/5  RLE:appropriate against gravity and medium force as tested 5/5              LLE: appropriate against gravity and medium force as tested 5/5    Reflexes:  Right Brachioradialis 2+  Left Brachioradialis 2+  Right Biceps 2+  Left Biceps 2+  Right Patellar2+  Left Patellar 2+  Right Achilles 2+  Left Achilles 2+                          Browning was negative    Sensory:  RUE  intact light touch  LUE intact light touch  RLE intact light touch  LLE intact light touch    Gait:   Romberg - negative  Normal gait  Tandem, Heel, and Toe Walk - able to perform without difficulty    Review of Data:   Notes from pcp reviewed   Labs:  No visits with results within 3 Month(s) from this visit.   Latest known visit with results is:   Clinical Support on 03/12/2020   Component Date Value Ref Range Status    Ascending aorta 03/12/2020 2.52  cm Final    STJ 03/12/2020 2.41  cm Final    AV mean gradient 03/12/2020 4  mmHg Final    Ao peak mario 03/12/2020 1.24  m/s Final    Ao VTI 03/12/2020 25.51  cm Final    IVRT 03/12/2020 88.49  msec Final    IVS 03/12/2020 0.80  0.6 - 1.1 cm Final    LA size 03/12/2020 3.39  cm Final    Left Atrium Major Axis 03/12/2020 4.41  cm Final    Left Atrium Minor Axis 03/12/2020 4.62  cm Final    LVIDD 03/12/2020 4.78  3.5 - 6.0 cm Final    LVIDS 03/12/2020 3.26  2.1 - 4.0 cm Final    LVOT diameter 03/12/2020 1.97  cm Final    LVOT peak VTI 03/12/2020 20.86  cm Final    PW 03/12/2020 0.79  0.6 - 1.1 cm Final    MV Peak A Mario 03/12/2020 0.50  m/s Final    E wave decelartion time 03/12/2020 169.79  msec Final    MV Peak E Mario 03/12/2020 0.96  m/s Final    PV Peak D Mario 03/12/2020 0.55  m/s Final    PV Peak S Mario 03/12/2020 0.49  m/s Final    RA Major Axis 03/12/2020 4.27  cm Final    RA Width  03/12/2020 2.87  cm Final    RVDD 03/12/2020 2.66  cm Final    Sinus 03/12/2020 2.75  cm Final    TAPSE 03/12/2020 1.47  cm Final    TR Max Mario 03/12/2020 2.17  m/s Final    TDI LATERAL 03/12/2020 0.16  m/s Final    TDI SEPTAL 03/12/2020 0.12  m/s Final    LA WIDTH 03/12/2020 3.17  cm Final    LV Diastolic Volume 03/12/2020 106.35  mL Final    LV Systolic Volume 03/12/2020 42.96  mL Final    LVOT peak mario 03/12/2020 1.06  m/s Final    LV LATERAL E/E' RATIO 03/12/2020 6.00  m/s Final    LV SEPTAL E/E' RATIO 03/12/2020 8.00  m/s Final    FS 03/12/2020 32  % Final    LA volume 03/12/2020 41.22  cm3 Final    LV mass 03/12/2020 124.78  g Final    Left Ventricle Relative Wall Thick* 03/12/2020 0.33  cm Final    AV valve area 03/12/2020 2.49  cm2 Final    AV Velocity Ratio 03/12/2020 0.85   Final    AV index (prosthetic) 03/12/2020 0.82   Final    E/A ratio 03/12/2020 1.92   Final    Mean e' 03/12/2020 0.14  m/s Final    Pulm vein S/D ratio 03/12/2020 0.89   Final    LVOT area 03/12/2020 3.0  cm2 Final    LVOT stroke volume 03/12/2020 63.55  cm3 Final    AV peak gradient 03/12/2020 6  mmHg Final    E/E' ratio 03/12/2020 6.86  m/s Final    Triscuspid Valve Regurgitation Pea* 03/12/2020 19  mmHg Final    BSA 03/12/2020 1.97  m2 Final    LV Systolic Volume Index 03/12/2020 22.2  mL/m2 Final    LV Diastolic Volume Index 03/12/2020 54.87  mL/m2 Final    LA Volume Index 03/12/2020 21.3  mL/m2 Final    LV Mass Index 03/12/2020 64  g/m2 Final    Right Atrial Pressure (from IVC) 03/12/2020 3  mmHg Final    TV rest pulmonary artery pressure 03/12/2020 22  mmHg Final     Imaging:  Results for orders placed or performed during the hospital encounter of 07/28/20   CT Head Without Contrast    Narrative    EXAMINATION:  CT HEAD WITHOUT CONTRAST    CLINICAL HISTORY:  Increased frequency of headache; weakness;  Migraine without aura, not intractable, without status migrainosus    TECHNIQUE:  Multiple  sequential 5 mm axial images of the head without contrast.  Coronal and sagittal reformatted imaging from the axial acquisition.    COMPARISON:  None    FINDINGS:  There is no evidence for acute intracranial hemorrhage or sulcal effacement.  The ventricles are normal in size without hydrocephalus.  There is no midline shift or mass effect.  Visualized paranasal sinuses and mastoid air cells are clear.      Impression    Unremarkable noncontrast CT head specifically without evidence for acute intracranial hemorrhage or hydrocephalus.  Clinical correlation and further evaluation as warranted.      Electronically signed by: Jsoe Luis Walker DO  Date:    07/28/2020  Time:    07:07     Note: I have independently reviewed any/all imaging/labs/tests and agree with the report (s) as documented.  Any discrepancies will be as noted/demarcated by free text.  FERNANDA OH 8/6/2020    ASSESSMENT:  1. Tension headache    2. Migraine without aura and without status migrainosus, not intractable    3. Bilateral occipital neuralgia    4. Essential hypertension          PLAN:  - Discussed symptoms appear to be consistent with migraines, tension HA's, and ON, discussed treatment options and patient agreed with the following plan:  - ppx - start propranolol  - abortive - trial maxalt, may try higher doses of tylenol, naproxen, or ibuprofen if maxalt fails but not at same times/days  - HTN - stable, continue anti-htn meds, mgmt per pcp  - avoid tpx and zonisamide - hx of kidney stones  - obtain baseline labs  - risks, benefits, and potential side effects of propranolol, maxalt discussed   - alternative treatment options offered   - importance of healthy diet, regular exercise and sleep hygiene in the treatment of headaches    - Start tracking headaches via Migraine Juan juan on phone   - RTC in 6 wks      Orders Placed This Encounter    CBC auto differential    Comprehensive metabolic panel    TSH    Vitamin B12    Vitamin D     rizatriptan (MAXALT-MLT) 5 MG disintegrating tablet       I have discussed realistic goals of care with patient at length as well as medication options, and need for lifestyle adjustment. I have explained that treatment will take time. We have agreed that the goal will be to reduce frequency/intensity/quantity of HA, not to be completely HA free. I have explained my non narcotic policy regarding headache treatment.    Patient agreeable to work on lifestyle adjustments.    Questions and concerns were sought and answered to the patient's stated verbal satisfaction.  The patient verbalizes understanding and agreement with the above stated treatment plan.     CC: MD Oliva Aviles PA-C  [facility]  [phone]    Dr. Franklin was available during today's encounter.

## 2020-08-17 NOTE — PROGRESS NOTES
CC: HTN    30 y.o. male w/ HTN,   Tx :irbesartan 150mg( last does Monday)  Held to see if BP would elevate and it hs trended upward over the week   and propranol 20mg BID- started for HA  Denied HA or dizziness  BP today==>140/98    Concern that some or all of the following 2/2 to HTN/meds  Calf injury 2/2 running  LBP injury 2/2 squats  Migraines over the past 6 mos,   s/p Neurology w/up Tx: BB and imitrex  GI issues- cramps, discomfort usually at the lower abdomen; w/up w/ GI and Urology And f/up w/ Gen Surgery   No weight loss, blood in stool, and doesn't awaken from sleep  Denied dysuria or hematuria or  Difficulty  Starting or stopping his stream.      MEDCARD: Reviewed  ROS:  No fever, chills ,or night sweats  No chest pain or palpitations  No cough or wheezing  No focal deficits or paresthesia  Remainder of review negative except as previously noted    PMHX: Reviewed  SHX: Reviewed- wife 8 weeks IUP  FHX: Reviewed    PE:  VSS:   GEN: WDWN, A&O, NAD, conversant and co-operative  EYES: Conj/lids unremarkable, sclera anicteric  RESP: Efforts unlabored  Lungs CTA  CV: Heart RRR  GI: BS + , soft, tender lower abdomen b/l >suprapubic region  MSK: Gait normal. No CCE  NEURO: MAZARIEGOS. No tremor or facial asymmetry  SKIN: Warm and dry    IMPRESSION:  HTN, to restart irbesartan today  Abdominal pain, w/up unremarkable to date, no red flag sx  Migraines, improved  Situational stress, had to step up to take over his Alevism when the  was dx with stage 4 cancer    PLAN:  Continue present meds  Resume ARB  Consider SSRI  RTC 6 mos  Call prn

## 2020-08-21 ENCOUNTER — OFFICE VISIT (OUTPATIENT)
Dept: INTERNAL MEDICINE | Facility: CLINIC | Age: 30
End: 2020-08-21
Payer: COMMERCIAL

## 2020-08-21 VITALS
RESPIRATION RATE: 14 BRPM | WEIGHT: 183 LBS | DIASTOLIC BLOOD PRESSURE: 98 MMHG | HEIGHT: 67 IN | SYSTOLIC BLOOD PRESSURE: 140 MMHG | TEMPERATURE: 98 F | BODY MASS INDEX: 28.72 KG/M2 | HEART RATE: 72 BPM

## 2020-08-21 DIAGNOSIS — I10 ESSENTIAL HYPERTENSION: Primary | ICD-10-CM

## 2020-08-21 DIAGNOSIS — G43.009 MIGRAINE WITHOUT AURA AND WITHOUT STATUS MIGRAINOSUS, NOT INTRACTABLE: ICD-10-CM

## 2020-08-21 DIAGNOSIS — R10.9 ABDOMINAL PAIN, UNSPECIFIED ABDOMINAL LOCATION: ICD-10-CM

## 2020-08-21 PROCEDURE — 99213 OFFICE O/P EST LOW 20 MIN: CPT | Mod: S$GLB,,, | Performed by: INTERNAL MEDICINE

## 2020-08-21 PROCEDURE — 3077F SYST BP >= 140 MM HG: CPT | Mod: CPTII,S$GLB,, | Performed by: INTERNAL MEDICINE

## 2020-08-21 PROCEDURE — 99999 PR PBB SHADOW E&M-EST. PATIENT-LVL III: CPT | Mod: PBBFAC,,, | Performed by: INTERNAL MEDICINE

## 2020-08-21 PROCEDURE — 3008F BODY MASS INDEX DOCD: CPT | Mod: CPTII,S$GLB,, | Performed by: INTERNAL MEDICINE

## 2020-08-21 PROCEDURE — 99999 PR PBB SHADOW E&M-EST. PATIENT-LVL III: ICD-10-PCS | Mod: PBBFAC,,, | Performed by: INTERNAL MEDICINE

## 2020-08-21 PROCEDURE — 3080F DIAST BP >= 90 MM HG: CPT | Mod: CPTII,S$GLB,, | Performed by: INTERNAL MEDICINE

## 2020-08-21 PROCEDURE — 3008F PR BODY MASS INDEX (BMI) DOCUMENTED: ICD-10-PCS | Mod: CPTII,S$GLB,, | Performed by: INTERNAL MEDICINE

## 2020-08-21 PROCEDURE — 3077F PR MOST RECENT SYSTOLIC BLOOD PRESSURE >= 140 MM HG: ICD-10-PCS | Mod: CPTII,S$GLB,, | Performed by: INTERNAL MEDICINE

## 2020-08-21 PROCEDURE — 3080F PR MOST RECENT DIASTOLIC BLOOD PRESSURE >= 90 MM HG: ICD-10-PCS | Mod: CPTII,S$GLB,, | Performed by: INTERNAL MEDICINE

## 2020-08-21 PROCEDURE — 99213 PR OFFICE/OUTPT VISIT, EST, LEVL III, 20-29 MIN: ICD-10-PCS | Mod: S$GLB,,, | Performed by: INTERNAL MEDICINE

## 2020-08-21 RX ORDER — OMEPRAZOLE 20 MG/1
20 CAPSULE, DELAYED RELEASE ORAL DAILY
COMMUNITY
End: 2021-02-05

## 2020-08-27 ENCOUNTER — OFFICE VISIT (OUTPATIENT)
Dept: UROLOGY | Facility: CLINIC | Age: 30
End: 2020-08-27
Payer: COMMERCIAL

## 2020-08-27 VITALS
HEART RATE: 81 BPM | HEIGHT: 67 IN | DIASTOLIC BLOOD PRESSURE: 95 MMHG | SYSTOLIC BLOOD PRESSURE: 142 MMHG | WEIGHT: 182.56 LBS | BODY MASS INDEX: 28.65 KG/M2

## 2020-08-27 DIAGNOSIS — N30.10 INTERSTITIAL CYSTITIS: Primary | ICD-10-CM

## 2020-08-27 PROCEDURE — 99999 PR PBB SHADOW E&M-EST. PATIENT-LVL III: CPT | Mod: PBBFAC,,, | Performed by: UROLOGY

## 2020-08-27 PROCEDURE — 3080F DIAST BP >= 90 MM HG: CPT | Mod: CPTII,S$GLB,, | Performed by: UROLOGY

## 2020-08-27 PROCEDURE — 3008F BODY MASS INDEX DOCD: CPT | Mod: CPTII,S$GLB,, | Performed by: UROLOGY

## 2020-08-27 PROCEDURE — 3008F PR BODY MASS INDEX (BMI) DOCUMENTED: ICD-10-PCS | Mod: CPTII,S$GLB,, | Performed by: UROLOGY

## 2020-08-27 PROCEDURE — 99214 OFFICE O/P EST MOD 30 MIN: CPT | Mod: S$GLB,,, | Performed by: UROLOGY

## 2020-08-27 PROCEDURE — 3080F PR MOST RECENT DIASTOLIC BLOOD PRESSURE >= 90 MM HG: ICD-10-PCS | Mod: CPTII,S$GLB,, | Performed by: UROLOGY

## 2020-08-27 PROCEDURE — 3077F PR MOST RECENT SYSTOLIC BLOOD PRESSURE >= 140 MM HG: ICD-10-PCS | Mod: CPTII,S$GLB,, | Performed by: UROLOGY

## 2020-08-27 PROCEDURE — 99214 PR OFFICE/OUTPT VISIT, EST, LEVL IV, 30-39 MIN: ICD-10-PCS | Mod: S$GLB,,, | Performed by: UROLOGY

## 2020-08-27 PROCEDURE — 99999 PR PBB SHADOW E&M-EST. PATIENT-LVL III: ICD-10-PCS | Mod: PBBFAC,,, | Performed by: UROLOGY

## 2020-08-27 PROCEDURE — 3077F SYST BP >= 140 MM HG: CPT | Mod: CPTII,S$GLB,, | Performed by: UROLOGY

## 2020-08-27 RX ORDER — AMITRIPTYLINE HYDROCHLORIDE 25 MG/1
25 TABLET, FILM COATED ORAL NIGHTLY
Qty: 30 TABLET | Refills: 11 | Status: SHIPPED | OUTPATIENT
Start: 2020-08-27 | End: 2021-02-05

## 2020-08-27 NOTE — LETTER
August 27, 2020      Kimi Valladares MD  [address] [facility] [place] [address]           [facility] - Urology Atrium 4th Fl  [address]  [facility] [address]  Phone: [phone]          Patient: Donal Serrano   MR Number: [id number]   YOB: 1990   Date of Visit: 8/27/2020       Dear Dr. Kimi Valladares:    Thank you for referring Donal Serrano to me for evaluation. Attached you will find relevant portions of my assessment and plan of care.    If you have questions, please do not hesitate to call me. I look forward to following Donal Serrano along with you.    Sincerely,    Ian Cornelius MD    Enclosure  CC:  No Recipients    If you would like to receive this communication electronically, please contact [email or web address] or [phone] to request more information on [employer] Link access.    For providers and/or their staff who would like to refer a patient to [facility], please contact us through our one-stop-shop provider referral line, [facility], at [phone].    If you feel you have received this communication in error or would no longer like to receive these types of communications, please e-mail [email or web address]

## 2020-09-22 ENCOUNTER — PATIENT OUTREACH (OUTPATIENT)
Dept: ADMINISTRATIVE | Facility: HOSPITAL | Age: 30
End: 2020-09-22

## 2020-09-22 NOTE — PROGRESS NOTES
Chart review completed  Left telephone and portal message to obtain most recent blood pressure reading.

## 2020-09-24 ENCOUNTER — PATIENT OUTREACH (OUTPATIENT)
Dept: ADMINISTRATIVE | Facility: OTHER | Age: 30
End: 2020-09-24

## 2020-09-24 NOTE — PROGRESS NOTES
LINKS immunization registry updated  Health Maintenance updated  Chart reviewed for overdue Proactive [facility] ([facility]) health maintenance testing (CRS, Breast Ca, Diabetic Eye Exam)   Orders entered:N/A

## 2020-09-25 ENCOUNTER — OFFICE VISIT (OUTPATIENT)
Dept: UROLOGY | Facility: CLINIC | Age: 30
End: 2020-09-25
Payer: COMMERCIAL

## 2020-09-25 ENCOUNTER — PATIENT MESSAGE (OUTPATIENT)
Dept: OTHER | Facility: OTHER | Age: 30
End: 2020-09-25

## 2020-09-25 VITALS
WEIGHT: 185.44 LBS | HEART RATE: 97 BPM | BODY MASS INDEX: 29.04 KG/M2 | SYSTOLIC BLOOD PRESSURE: 140 MMHG | DIASTOLIC BLOOD PRESSURE: 93 MMHG

## 2020-09-25 DIAGNOSIS — N30.10 INTERSTITIAL CYSTITIS: Primary | ICD-10-CM

## 2020-09-25 PROCEDURE — 3080F PR MOST RECENT DIASTOLIC BLOOD PRESSURE >= 90 MM HG: ICD-10-PCS | Mod: CPTII,S$GLB,, | Performed by: UROLOGY

## 2020-09-25 PROCEDURE — 3008F BODY MASS INDEX DOCD: CPT | Mod: CPTII,S$GLB,, | Performed by: UROLOGY

## 2020-09-25 PROCEDURE — 99213 OFFICE O/P EST LOW 20 MIN: CPT | Mod: S$GLB,,, | Performed by: UROLOGY

## 2020-09-25 PROCEDURE — 3080F DIAST BP >= 90 MM HG: CPT | Mod: CPTII,S$GLB,, | Performed by: UROLOGY

## 2020-09-25 PROCEDURE — 3008F PR BODY MASS INDEX (BMI) DOCUMENTED: ICD-10-PCS | Mod: CPTII,S$GLB,, | Performed by: UROLOGY

## 2020-09-25 PROCEDURE — 3077F PR MOST RECENT SYSTOLIC BLOOD PRESSURE >= 140 MM HG: ICD-10-PCS | Mod: CPTII,S$GLB,, | Performed by: UROLOGY

## 2020-09-25 PROCEDURE — 99999 PR PBB SHADOW E&M-EST. PATIENT-LVL III: ICD-10-PCS | Mod: PBBFAC,,, | Performed by: UROLOGY

## 2020-09-25 PROCEDURE — 99213 PR OFFICE/OUTPT VISIT, EST, LEVL III, 20-29 MIN: ICD-10-PCS | Mod: S$GLB,,, | Performed by: UROLOGY

## 2020-09-25 PROCEDURE — 99999 PR PBB SHADOW E&M-EST. PATIENT-LVL III: CPT | Mod: PBBFAC,,, | Performed by: UROLOGY

## 2020-09-25 PROCEDURE — 3077F SYST BP >= 140 MM HG: CPT | Mod: CPTII,S$GLB,, | Performed by: UROLOGY

## 2020-09-25 NOTE — PROGRESS NOTES
[facility]        Bladder Pain Return Note     9/25/20     Referred by:  Kimi Valladares     HPI: Donal Serrano is a very pleasant 30 y.o. male who is a return patient to our department referred for evaluation of suspected bladder pain of 8-10 months duration.He reports that the pain is related to his voiding cycle. He reports pain was associated with urinary frequency (10-12x daily) with nocturia (1-2x per night).      He first noticed these symptoms last November. At the time, he was diagnosed with chronic prostatitis and was treated with a 2 week course of ciprofloxacin.  This resulted in significant GI side effects but did not impact his lower urinary tract symptoms.  He also had a CT of the abdomen pelvis without contrast which was reviewed today.  This demonstrated no evidence of urolithiasis (in this patient with a prior history of urolithiasis).  His symptoms gradually improved over several weeks and remained quiescent for approximately the last 7 months.  Three weeks ago, he developed symptoms which were identical to his episode in November.     He reports symptoms are triggered by consumption of coffee or other caffeinated beverages. He does report periodic flares lasting several weeks.     Last visit I started him on Amitriptyline 25 mg qhs. This has resulted in near-resolution of his symptoms. He is having minimal side effects.      A review of 10+ systems was conducted with pertinent positive and negative findings documented in HPI with all other systems reviewed and negative.     Past medical, family, surgical and social history reviewed as documented in chart with pertinent positive medical, family, surgical and social history detailed in HPI.     Exam Findings:     Const: no acute distress, conversant and alert  Eyes: anicteric, extraocular muscles intact  ENMT: normocephalic, Nl oral membranes  Cardio: no cyanosis, nl cap refill  Pulm: no tachypnea; no resp  distress  Abd: soft, no tenderness  Musc: no laceration, no tenderness  Neuro: alert; oriented x 3  Skin: warm, dry; no petichiae  Psych: no anxiety; normal speech      Assessment/Plan:     Bladder Pain (estab,improved): He reports reports pelvic pain, pressure and discomfort that appears associated with the voiding cycle.  Urinary frequency is largely driven by pain and discomfort rather than by a desire to avoid incontinence.  The history suggests the likelihood of Bladder Pain Syndrome/InterstitialCystitis (BPS/IC) as the most likely diagnosis.  There are no clearly obvious competing etiologies after an initial evaluation, though other diagnoses will continue to be considered, particularly if symptoms do not respond to initial therapy.      Improved with low-dose amitriptyline     1. Continue Amitriptyline 25 mg QHS   2. Drug Holiday in the next few weeks  3. Return in 6 months

## 2020-10-07 ENCOUNTER — TELEPHONE (OUTPATIENT)
Dept: INTERNAL MEDICINE | Facility: CLINIC | Age: 30
End: 2020-10-07

## 2020-10-23 ENCOUNTER — PATIENT MESSAGE (OUTPATIENT)
Dept: INTERNAL MEDICINE | Facility: CLINIC | Age: 30
End: 2020-10-23

## 2020-10-23 ENCOUNTER — OFFICE VISIT (OUTPATIENT)
Dept: INTERNAL MEDICINE | Facility: CLINIC | Age: 30
End: 2020-10-23
Payer: COMMERCIAL

## 2020-10-23 VITALS
TEMPERATURE: 97 F | SYSTOLIC BLOOD PRESSURE: 122 MMHG | DIASTOLIC BLOOD PRESSURE: 84 MMHG | HEIGHT: 67 IN | OXYGEN SATURATION: 97 % | HEART RATE: 74 BPM | WEIGHT: 182.56 LBS | RESPIRATION RATE: 15 BRPM | BODY MASS INDEX: 28.65 KG/M2

## 2020-10-23 DIAGNOSIS — J02.9 ACUTE PHARYNGITIS, UNSPECIFIED ETIOLOGY: Primary | ICD-10-CM

## 2020-10-23 LAB — GROUP A STREP, MOLECULAR: NEGATIVE

## 2020-10-23 PROCEDURE — 99999 PR PBB SHADOW E&M-EST. PATIENT-LVL IV: ICD-10-PCS | Mod: PBBFAC,,, | Performed by: INTERNAL MEDICINE

## 2020-10-23 PROCEDURE — 3074F PR MOST RECENT SYSTOLIC BLOOD PRESSURE < 130 MM HG: ICD-10-PCS | Mod: CPTII,S$GLB,, | Performed by: INTERNAL MEDICINE

## 2020-10-23 PROCEDURE — 99213 PR OFFICE/OUTPT VISIT, EST, LEVL III, 20-29 MIN: ICD-10-PCS | Mod: S$GLB,,, | Performed by: INTERNAL MEDICINE

## 2020-10-23 PROCEDURE — 87651 STREP A DNA AMP PROBE: CPT | Mod: PO

## 2020-10-23 PROCEDURE — 3079F DIAST BP 80-89 MM HG: CPT | Mod: CPTII,S$GLB,, | Performed by: INTERNAL MEDICINE

## 2020-10-23 PROCEDURE — 3008F PR BODY MASS INDEX (BMI) DOCUMENTED: ICD-10-PCS | Mod: CPTII,S$GLB,, | Performed by: INTERNAL MEDICINE

## 2020-10-23 PROCEDURE — 3008F BODY MASS INDEX DOCD: CPT | Mod: CPTII,S$GLB,, | Performed by: INTERNAL MEDICINE

## 2020-10-23 PROCEDURE — 99213 OFFICE O/P EST LOW 20 MIN: CPT | Mod: S$GLB,,, | Performed by: INTERNAL MEDICINE

## 2020-10-23 PROCEDURE — 3079F PR MOST RECENT DIASTOLIC BLOOD PRESSURE 80-89 MM HG: ICD-10-PCS | Mod: CPTII,S$GLB,, | Performed by: INTERNAL MEDICINE

## 2020-10-23 PROCEDURE — 99999 PR PBB SHADOW E&M-EST. PATIENT-LVL IV: CPT | Mod: PBBFAC,,, | Performed by: INTERNAL MEDICINE

## 2020-10-23 PROCEDURE — 3074F SYST BP LT 130 MM HG: CPT | Mod: CPTII,S$GLB,, | Performed by: INTERNAL MEDICINE

## 2020-10-23 NOTE — PROGRESS NOTES
CC: sore throat  HPI:  The patient is a 30 y.o. year old male who presents to the office for soire throat.  He denies any ear pain or fever.  His symptoms started about 2 days ago.  He deneies any nasal congestion, postnasal drip or rhinorrhea.  He denies any cough..      PAST MEDICAL HISTORY:  Past Medical History:   Diagnosis Date    Keloid cicatrix     Kidney stones        SURGICAL HISTORY:  Past Surgical History:   Procedure Laterality Date    APPENDECTOMY      CHOLECYSTECTOMY  03/2012    LAPAROSCOPIC APPENDECTOMY N/A 8/20/2018    Procedure: APPENDECTOMY, LAPAROSCOPIC;  Surgeon: Saulo Powell MD;  Location: [facility] OR [address] [place] [address];  Service: General;  Laterality: N/A;       MEDS:  Medcard reviewed and updated    ALLERGIES: Allergy Card reviewed and updated    SOCIAL HISTORY:   The patient is a nonsmoker.    PE:   APPEARANCE: Well nourished, well developed, in no acute distress.    EARS: TM's intact. No retraction or perforation.    NOSE: Mucosa pink. Airway clear.  MOUTH & THROAT: No tonsillar enlargement. No pharyngeal erythema or exudate. No stridor.  CHEST: Lungs clear to auscultation with unlabored respirations.  CARDIOVASCULAR: Normal S1, S2. No murmurs. No carotid bruits. No pedal edema.  ABDOMEN: Bowel sounds normal. Not distended. Soft. No tenderness or masses.   PSYCHIATRIC: The patient is oriented to person, place, and time and has a pleasant affect.        ASSESSMENT/PLAN:  Donal was seen today for sore throat.    Diagnoses and all orders for this visit:    Acute pharyngitis, unspecified etiology  -     Group A Strep, Molecular

## 2020-10-24 RX ORDER — CLINDAMYCIN HYDROCHLORIDE 150 MG/1
150 CAPSULE ORAL EVERY 8 HOURS
Qty: 21 CAPSULE | Refills: 0 | Status: SHIPPED | OUTPATIENT
Start: 2020-10-24 | End: 2020-10-31

## 2020-11-16 ENCOUNTER — PATIENT OUTREACH (OUTPATIENT)
Dept: ADMINISTRATIVE | Facility: OTHER | Age: 30
End: 2020-11-16

## 2020-11-17 ENCOUNTER — TELEPHONE (OUTPATIENT)
Dept: ORTHOPEDICS | Facility: CLINIC | Age: 30
End: 2020-11-17

## 2020-11-17 ENCOUNTER — PATIENT MESSAGE (OUTPATIENT)
Dept: ORTHOPEDICS | Facility: CLINIC | Age: 30
End: 2020-11-17

## 2020-11-17 DIAGNOSIS — M51.36 DDD (DEGENERATIVE DISC DISEASE), LUMBAR: Primary | ICD-10-CM

## 2020-11-18 ENCOUNTER — HOSPITAL ENCOUNTER (OUTPATIENT)
Dept: RADIOLOGY | Facility: HOSPITAL | Age: 30
Discharge: HOME | End: 2020-11-18
Attending: ORTHOPAEDIC SURGERY
Payer: COMMERCIAL

## 2020-11-18 ENCOUNTER — OFFICE VISIT (OUTPATIENT)
Dept: ORTHOPEDICS | Facility: CLINIC | Age: 30
End: 2020-11-18
Payer: COMMERCIAL

## 2020-11-18 VITALS — HEIGHT: 67 IN | BODY MASS INDEX: 28.56 KG/M2 | WEIGHT: 182 LBS

## 2020-11-18 DIAGNOSIS — G89.29 CHRONIC BILATERAL LOW BACK PAIN WITHOUT SCIATICA: Primary | ICD-10-CM

## 2020-11-18 DIAGNOSIS — M51.36 DDD (DEGENERATIVE DISC DISEASE), LUMBAR: ICD-10-CM

## 2020-11-18 DIAGNOSIS — M54.50 CHRONIC BILATERAL LOW BACK PAIN WITHOUT SCIATICA: Primary | ICD-10-CM

## 2020-11-18 PROCEDURE — 72100 XR LUMBAR SPINE AP AND LAT WITH FLEX/EXT: ICD-10-PCS | Mod: 26,,, | Performed by: RADIOLOGY

## 2020-11-18 PROCEDURE — 99999 PR PBB SHADOW E&M-EST. PATIENT-LVL III: CPT | Mod: PBBFAC,,, | Performed by: ORTHOPAEDIC SURGERY

## 2020-11-18 PROCEDURE — 99214 PR OFFICE/OUTPT VISIT, EST, LEVL IV, 30-39 MIN: ICD-10-PCS | Mod: S$GLB,,, | Performed by: ORTHOPAEDIC SURGERY

## 2020-11-18 PROCEDURE — 72120 XR LUMBAR SPINE AP AND LAT WITH FLEX/EXT: ICD-10-PCS | Mod: 26,,, | Performed by: RADIOLOGY

## 2020-11-18 PROCEDURE — 99214 OFFICE O/P EST MOD 30 MIN: CPT | Mod: S$GLB,,, | Performed by: ORTHOPAEDIC SURGERY

## 2020-11-18 PROCEDURE — 1125F PR PAIN SEVERITY QUANTIFIED, PAIN PRESENT: ICD-10-PCS | Mod: S$GLB,,, | Performed by: ORTHOPAEDIC SURGERY

## 2020-11-18 PROCEDURE — 72100 X-RAY EXAM L-S SPINE 2/3 VWS: CPT | Mod: 26,,, | Performed by: RADIOLOGY

## 2020-11-18 PROCEDURE — 99999 PR PBB SHADOW E&M-EST. PATIENT-LVL III: ICD-10-PCS | Mod: PBBFAC,,, | Performed by: ORTHOPAEDIC SURGERY

## 2020-11-18 PROCEDURE — 1125F AMNT PAIN NOTED PAIN PRSNT: CPT | Mod: S$GLB,,, | Performed by: ORTHOPAEDIC SURGERY

## 2020-11-18 PROCEDURE — 3008F BODY MASS INDEX DOCD: CPT | Mod: CPTII,S$GLB,, | Performed by: ORTHOPAEDIC SURGERY

## 2020-11-18 PROCEDURE — 72120 X-RAY BEND ONLY L-S SPINE: CPT | Mod: 26,,, | Performed by: RADIOLOGY

## 2020-11-18 PROCEDURE — 3008F PR BODY MASS INDEX (BMI) DOCUMENTED: ICD-10-PCS | Mod: CPTII,S$GLB,, | Performed by: ORTHOPAEDIC SURGERY

## 2020-11-18 PROCEDURE — 72100 X-RAY EXAM L-S SPINE 2/3 VWS: CPT | Mod: TC

## 2020-11-18 RX ORDER — METHOCARBAMOL 750 MG/1
750 TABLET, FILM COATED ORAL 3 TIMES DAILY
Qty: 30 TABLET | Refills: 0 | Status: SHIPPED | OUTPATIENT
Start: 2020-11-18 | End: 2020-11-28

## 2020-11-18 RX ORDER — MELOXICAM 15 MG/1
15 TABLET ORAL DAILY
Qty: 30 TABLET | Refills: 0 | Status: SHIPPED | OUTPATIENT
Start: 2020-11-18 | End: 2021-02-05

## 2020-11-18 NOTE — PROGRESS NOTES
DATE: 11/18/2020  PATIENT: Donal Serrano    Supervising Physician: Joe Field M.D.    CHIEF COMPLAINT: low back pain    HISTORY:  Donal Serrano is a 30 y.o. male here for initial evaluation of low back pain (Back - 3, Leg - 0).  The pain in the low back is what bothers him most.  The pain has been present for about a year, worsening in the past 6-8 weeks. Pt says a year ago he was doing a squat and felt an immediate pain in his back. He was seen by Rosy Aguilar PA-C and was given a medrol dose pack, mobic, and robaxin. He says after a few weeks his pain subsided. However, in the past 6-8 weeks pt has been experiencing a constant ache in his low back. The pain is worse with walking, running, activity and improved by rest. There is negative associated numbness and tingling. There is negative subjective weakness. Prior treatments have included OTC NSAIDs, but no PT, ESIs, or surgery.    The patient denies myelopathic symptoms such as handwriting changes or difficulty with buttons/coins/keys. Denies perineal paresthesias, bowel/bladder dysfunction.    PAST MEDICAL/SURGICAL HISTORY:  Past Medical History:   Diagnosis Date    Keloid cicatrix     Kidney stones      Past Surgical History:   Procedure Laterality Date    APPENDECTOMY      CHOLECYSTECTOMY  03/2012    LAPAROSCOPIC APPENDECTOMY N/A 8/20/2018    Procedure: APPENDECTOMY, LAPAROSCOPIC;  Surgeon: Saulo Powell MD;  Location: [facility] OR [address] [place] [address];  Service: General;  Laterality: N/A;       Medications:   Current Outpatient Medications on File Prior to Visit   Medication Sig Dispense Refill    amitriptyline (ELAVIL) 25 MG tablet Take 1 tablet (25 mg total) by mouth every evening. 30 tablet 11    irbesartan (AVAPRO) 150 MG tablet Take 1 tablet (150 mg total) by mouth every evening. 90 tablet 3    omeprazole (PRILOSEC) 20 MG capsule Take 20 mg by mouth once daily.      propranoloL (INDERAL) 20 MG tablet Take 1 tablet (20 mg total) by  mouth 2 (two) times daily. (Patient not taking: Reported on 8/21/2020) 60 tablet 11    rizatriptan (MAXALT-MLT) 5 MG disintegrating tablet Take at onset of migraine, can repeat in 2 hrs if needed.  No more than 2 tabs per day or 3 days/wk. (Patient not taking: Reported on 8/21/2020) 12 tablet 3     No current facility-administered medications on file prior to visit.        Social History:   Social History     Socioeconomic History    Marital status:      Spouse name: Not on file    Number of children: Not on file    Years of education: Not on file    Highest education level: Not on file   Occupational History    Occupation:    Social Needs    Financial resource strain: Not hard at all    Food insecurity     Worry: Never true     Inability: Never true    Transportation needs     Medical: No     Non-medical: No   Tobacco Use    Smoking status: Never Smoker    Smokeless tobacco: Never Used   Substance and Sexual Activity    Alcohol use: No     Frequency: Monthly or less     Drinks per session: 1 or 2     Binge frequency: Never    Drug use: No    Sexual activity: Not Currently   Lifestyle    Physical activity     Days per week: 4 days     Minutes per session: 40 min    Stress: To some extent   Relationships    Social connections     Talks on phone: More than three times a week     Gets together: More than three times a week     Attends [employer] service: Not on file     Active member of club or organization: Yes     Attends meetings of clubs or organizations: More than 4 times per year     Relationship status: Living with partner   Other Topics Concern    Not on file   Social History Narrative    Not on file       REVIEW OF SYSTEMS:  Constitution: Negative. Negative for chills, fever and night sweats.   Cardiovascular: Negative for chest pain and syncope.   Respiratory: Negative for cough and shortness of breath.   Gastrointestinal: See HPI. Negative for nausea/vomiting. Negative for  "abdominal pain.  Genitourinary: See HPI. Negative for discoloration or dysuria.  Skin: Negative for dry skin, itching and rash.   Hematologic/Lymphatic: Negative for bleeding problem. Does not bruise/bleed easily.   Musculoskeletal: Negative for falls and muscle weakness.   Neurological: See HPI. No seizures.   Endocrine: Negative for polydipsia, polyphagia and polyuria.   Allergic/Immunologic: Negative for hives and persistent infections.     EXAM:  Ht 5' 7" (1.702 m)   Wt 82.6 kg (182 lb)   BMI 28.51 kg/m²     General: The patient is a very pleasant 30 y.o. male in no apparent distress, the patient is oriented to person, place and time.  Psych: Normal mood and affect  HEENT: Vision grossly intact, hearing intact to the spoken word.  Lungs: Respirations unlabored.  Gait: Normal station and gait, no difficulty with toe or heel walk.   Skin: Dorsal lumbar skin negative for rashes, lesions, hairy patches and surgical scars. There is negative lumbar tenderness to palpation.  Range of motion: Lumbar range of motion is acceptable.  Spinal Balance: Global saggital and coronal spinal balance acceptable, not significant for scoliosis and kyphosis.  Musculoskeletal: No pain with the range of motion of the bilateral hips. No trochanteric tenderness to palpation.  Vascular: Bilateral lower extremities warm and well perfused, dorsalis pedis pulses 2+ bilaterally.  Neurological: Normal strength and tone in all major motor groups in the bilateral lower extremities. Normal sensation to light touch in the L2-S1 dermatomes bilaterally.  Deep tendon reflexes symmetric 2+ in the bilateral lower extremities.  Negative Babinski bilaterally. Straight leg raise negative bilaterally.    IMAGING:      Today I personally reviewed AP, Lat and Flex/Ex  upright L-spine films that demonstrate mild disc space narrowing at L4-L5 and L5-S1.      Body mass index is 28.51 kg/m².    Hemoglobin A1C   Date Value Ref Range Status   06/07/2011 4.9 4.0 - " 6.2 % Final   04/27/2009 4.9 4.0 - 6.2 % Final   02/14/2005 4.8 4.5 - 6.2 % Final           ASSESSMENT/PLAN:    Donal was seen today for pain.    Diagnoses and all orders for this visit:    Chronic bilateral low back pain without sciatica  -     Ambulatory referral/consult to Physical/Occupational Therapy; Future    Other orders  -     meloxicam (MOBIC) 15 MG tablet; Take 1 tablet (15 mg total) by mouth once daily.  -     methocarbamoL (ROBAXIN) 750 MG Tab; Take 1 tablet (750 mg total) by mouth 3 (three) times daily. for 10 days        Today we discussed at length all of the different treatment options including anti-inflammatories, acetaminophen, rest, ice, heat, physical therapy including strengthening and stretching exercises, home exercises, ROM, aerobic conditioning, aqua therapy, other modalities including ultrasound, massage, and dry needling, epidural steroid injections and finally surgical intervention.      Sent prescriptions for mobic and robaxin to pt's pharmacy and put it orders for physical therapy at [facility]. Pt will f/u in clinic after therapy if pain does not subside, and we will consider lumbar MRI.

## 2020-12-11 ENCOUNTER — PATIENT MESSAGE (OUTPATIENT)
Dept: OTHER | Facility: OTHER | Age: 30
End: 2020-12-11

## 2020-12-18 ENCOUNTER — PATIENT OUTREACH (OUTPATIENT)
Dept: ADMINISTRATIVE | Facility: OTHER | Age: 30
End: 2020-12-18

## 2020-12-18 NOTE — PROGRESS NOTES
LINKS immunization registry not responding  Health Maintenance updated  Chart reviewed for overdue Proactive [facility] ([facility]) health maintenance testing (CRS, Breast Ca, Diabetic Eye Exam)   Orders entered:N/A  
commode/rolling walker

## 2020-12-23 ENCOUNTER — OFFICE VISIT (OUTPATIENT)
Dept: OTOLARYNGOLOGY | Facility: CLINIC | Age: 30
End: 2020-12-23
Payer: COMMERCIAL

## 2020-12-23 VITALS — HEART RATE: 78 BPM | DIASTOLIC BLOOD PRESSURE: 93 MMHG | SYSTOLIC BLOOD PRESSURE: 127 MMHG

## 2020-12-23 DIAGNOSIS — H61.23 BILATERAL IMPACTED CERUMEN: Primary | ICD-10-CM

## 2020-12-23 PROCEDURE — 99499 UNLISTED E&M SERVICE: CPT | Mod: S$GLB,,, | Performed by: NURSE PRACTITIONER

## 2020-12-23 PROCEDURE — 99999 PR PBB SHADOW E&M-EST. PATIENT-LVL III: ICD-10-PCS | Mod: PBBFAC,,, | Performed by: NURSE PRACTITIONER

## 2020-12-23 PROCEDURE — 99999 PR PBB SHADOW E&M-EST. PATIENT-LVL III: CPT | Mod: PBBFAC,,, | Performed by: NURSE PRACTITIONER

## 2020-12-23 PROCEDURE — 99499 NO LOS: ICD-10-PCS | Mod: S$GLB,,, | Performed by: NURSE PRACTITIONER

## 2020-12-23 PROCEDURE — 1126F PR PAIN SEVERITY QUANTIFIED, NO PAIN PRESENT: ICD-10-PCS | Mod: S$GLB,,, | Performed by: NURSE PRACTITIONER

## 2020-12-23 PROCEDURE — 69210 EAR CERUMEN REMOVAL: ICD-10-PCS | Mod: S$GLB,,, | Performed by: NURSE PRACTITIONER

## 2020-12-23 PROCEDURE — 69210 REMOVE IMPACTED EAR WAX UNI: CPT | Mod: S$GLB,,, | Performed by: NURSE PRACTITIONER

## 2020-12-23 PROCEDURE — 1126F AMNT PAIN NOTED NONE PRSNT: CPT | Mod: S$GLB,,, | Performed by: NURSE PRACTITIONER

## 2020-12-23 NOTE — PROCEDURES
Ear Cerumen Removal    Date/Time: 12/23/2020 9:00 AM  Performed by: Kojo Castillo NP  Authorized by: Kojo Castillo NP     Consent Done?:  Yes (Verbal)  Location details:  Both ears  Procedure type: curette    Cerumen  Removal Results:  Cerumen completely removed     Procedure Note:    The patient was brought to the minor procedure room and placed under the operating microscope of the left ear canal which was cleaned of ceruminous debris. Using a combination of suction, curettes and cup forceps the patient's cerumen impaction was removed. The tympanic membrane was evaluated and was unremarkable. The patient tolerated the procedure well. There were no complications.  Procedure Note:    Patient was brought to the minor procedure room and using the operating microscope of the right ear canal which was cleaned of ceruminous debris. There was a significant cerumen impaction.  Using a combination of suction, curettes and cup forceps the patient's cerumen impaction was removed. Tympanic membrane intact. Pt tolerated well. There were no complications.

## 2021-01-12 ENCOUNTER — OFFICE VISIT (OUTPATIENT)
Dept: RHEUMATOLOGY | Facility: CLINIC | Age: 31
End: 2021-01-12
Payer: COMMERCIAL

## 2021-01-12 VITALS
HEART RATE: 78 BPM | WEIGHT: 188.69 LBS | SYSTOLIC BLOOD PRESSURE: 137 MMHG | HEIGHT: 67 IN | DIASTOLIC BLOOD PRESSURE: 97 MMHG | BODY MASS INDEX: 29.62 KG/M2

## 2021-01-12 DIAGNOSIS — M21.40 PES PLANUS, UNSPECIFIED LATERALITY: ICD-10-CM

## 2021-01-12 DIAGNOSIS — M25.551 HIP PAIN, BILATERAL: Primary | ICD-10-CM

## 2021-01-12 DIAGNOSIS — M25.552 HIP PAIN, BILATERAL: Primary | ICD-10-CM

## 2021-01-12 DIAGNOSIS — M54.2 NECK PAIN: ICD-10-CM

## 2021-01-12 PROCEDURE — 3008F PR BODY MASS INDEX (BMI) DOCUMENTED: ICD-10-PCS | Mod: CPTII,S$GLB,, | Performed by: INTERNAL MEDICINE

## 2021-01-12 PROCEDURE — 3080F PR MOST RECENT DIASTOLIC BLOOD PRESSURE >= 90 MM HG: ICD-10-PCS | Mod: CPTII,S$GLB,, | Performed by: INTERNAL MEDICINE

## 2021-01-12 PROCEDURE — 3008F BODY MASS INDEX DOCD: CPT | Mod: CPTII,S$GLB,, | Performed by: INTERNAL MEDICINE

## 2021-01-12 PROCEDURE — 3075F SYST BP GE 130 - 139MM HG: CPT | Mod: CPTII,S$GLB,, | Performed by: INTERNAL MEDICINE

## 2021-01-12 PROCEDURE — 99999 PR PBB SHADOW E&M-EST. PATIENT-LVL IV: ICD-10-PCS | Mod: PBBFAC,,, | Performed by: INTERNAL MEDICINE

## 2021-01-12 PROCEDURE — 1125F PR PAIN SEVERITY QUANTIFIED, PAIN PRESENT: ICD-10-PCS | Mod: S$GLB,,, | Performed by: INTERNAL MEDICINE

## 2021-01-12 PROCEDURE — 99205 PR OFFICE/OUTPT VISIT, NEW, LEVL V, 60-74 MIN: ICD-10-PCS | Mod: S$GLB,,, | Performed by: INTERNAL MEDICINE

## 2021-01-12 PROCEDURE — 1125F AMNT PAIN NOTED PAIN PRSNT: CPT | Mod: S$GLB,,, | Performed by: INTERNAL MEDICINE

## 2021-01-12 PROCEDURE — 99999 PR PBB SHADOW E&M-EST. PATIENT-LVL IV: CPT | Mod: PBBFAC,,, | Performed by: INTERNAL MEDICINE

## 2021-01-12 PROCEDURE — 3075F PR MOST RECENT SYSTOLIC BLOOD PRESS GE 130-139MM HG: ICD-10-PCS | Mod: CPTII,S$GLB,, | Performed by: INTERNAL MEDICINE

## 2021-01-12 PROCEDURE — 99205 OFFICE O/P NEW HI 60 MIN: CPT | Mod: S$GLB,,, | Performed by: INTERNAL MEDICINE

## 2021-01-12 PROCEDURE — 3080F DIAST BP >= 90 MM HG: CPT | Mod: CPTII,S$GLB,, | Performed by: INTERNAL MEDICINE

## 2021-01-12 RX ORDER — CHOLECALCIFEROL (VITAMIN D3) 25 MCG
1000 TABLET ORAL DAILY
COMMUNITY
End: 2023-03-21

## 2021-01-15 ENCOUNTER — HOSPITAL ENCOUNTER (OUTPATIENT)
Dept: RADIOLOGY | Facility: HOSPITAL | Age: 31
Discharge: HOME | End: 2021-01-15
Attending: INTERNAL MEDICINE
Payer: COMMERCIAL

## 2021-01-15 DIAGNOSIS — M54.2 NECK PAIN: ICD-10-CM

## 2021-01-15 PROCEDURE — 72040 X-RAY EXAM NECK SPINE 2-3 VW: CPT | Mod: TC,FY,PO

## 2021-01-15 PROCEDURE — 72040 X-RAY EXAM NECK SPINE 2-3 VW: CPT | Mod: 26,,, | Performed by: RADIOLOGY

## 2021-01-15 PROCEDURE — 72040 XR CERVICAL SPINE FLEXION  AND EXTENSION ONLY: ICD-10-PCS | Mod: 26,,, | Performed by: RADIOLOGY

## 2021-02-01 ENCOUNTER — PATIENT MESSAGE (OUTPATIENT)
Dept: INTERNAL MEDICINE | Facility: CLINIC | Age: 31
End: 2021-02-01

## 2021-02-01 RX ORDER — IRBESARTAN 150 MG/1
150 TABLET ORAL NIGHTLY
Qty: 90 TABLET | Refills: 3 | Status: SHIPPED | OUTPATIENT
Start: 2021-02-01 | End: 2021-03-28 | Stop reason: SDUPTHER

## 2021-02-05 ENCOUNTER — OFFICE VISIT (OUTPATIENT)
Dept: INTERNAL MEDICINE | Facility: CLINIC | Age: 31
End: 2021-02-05
Payer: COMMERCIAL

## 2021-02-05 ENCOUNTER — TELEPHONE (OUTPATIENT)
Dept: INTERNAL MEDICINE | Facility: CLINIC | Age: 31
End: 2021-02-05

## 2021-02-05 VITALS
WEIGHT: 182.56 LBS | HEART RATE: 84 BPM | HEIGHT: 67 IN | DIASTOLIC BLOOD PRESSURE: 88 MMHG | OXYGEN SATURATION: 97 % | RESPIRATION RATE: 16 BRPM | TEMPERATURE: 98 F | BODY MASS INDEX: 28.65 KG/M2 | SYSTOLIC BLOOD PRESSURE: 116 MMHG

## 2021-02-05 DIAGNOSIS — Z00.00 PREVENTATIVE HEALTH CARE: Primary | ICD-10-CM

## 2021-02-05 DIAGNOSIS — R10.33 PERIUMBILICAL ABDOMINAL PAIN: ICD-10-CM

## 2021-02-05 DIAGNOSIS — I10 ESSENTIAL HYPERTENSION: Primary | ICD-10-CM

## 2021-02-05 PROCEDURE — 99214 OFFICE O/P EST MOD 30 MIN: CPT | Mod: S$GLB,,, | Performed by: INTERNAL MEDICINE

## 2021-02-05 PROCEDURE — 3008F PR BODY MASS INDEX (BMI) DOCUMENTED: ICD-10-PCS | Mod: CPTII,S$GLB,, | Performed by: INTERNAL MEDICINE

## 2021-02-05 PROCEDURE — 3074F PR MOST RECENT SYSTOLIC BLOOD PRESSURE < 130 MM HG: ICD-10-PCS | Mod: CPTII,S$GLB,, | Performed by: INTERNAL MEDICINE

## 2021-02-05 PROCEDURE — 1126F AMNT PAIN NOTED NONE PRSNT: CPT | Mod: S$GLB,,, | Performed by: INTERNAL MEDICINE

## 2021-02-05 PROCEDURE — 99999 PR PBB SHADOW E&M-EST. PATIENT-LVL IV: ICD-10-PCS | Mod: PBBFAC,,, | Performed by: INTERNAL MEDICINE

## 2021-02-05 PROCEDURE — 3079F DIAST BP 80-89 MM HG: CPT | Mod: CPTII,S$GLB,, | Performed by: INTERNAL MEDICINE

## 2021-02-05 PROCEDURE — 3008F BODY MASS INDEX DOCD: CPT | Mod: CPTII,S$GLB,, | Performed by: INTERNAL MEDICINE

## 2021-02-05 PROCEDURE — 99999 PR PBB SHADOW E&M-EST. PATIENT-LVL IV: CPT | Mod: PBBFAC,,, | Performed by: INTERNAL MEDICINE

## 2021-02-05 PROCEDURE — 3079F PR MOST RECENT DIASTOLIC BLOOD PRESSURE 80-89 MM HG: ICD-10-PCS | Mod: CPTII,S$GLB,, | Performed by: INTERNAL MEDICINE

## 2021-02-05 PROCEDURE — 3074F SYST BP LT 130 MM HG: CPT | Mod: CPTII,S$GLB,, | Performed by: INTERNAL MEDICINE

## 2021-02-05 PROCEDURE — 99214 PR OFFICE/OUTPT VISIT, EST, LEVL IV, 30-39 MIN: ICD-10-PCS | Mod: S$GLB,,, | Performed by: INTERNAL MEDICINE

## 2021-02-05 PROCEDURE — 1126F PR PAIN SEVERITY QUANTIFIED, NO PAIN PRESENT: ICD-10-PCS | Mod: S$GLB,,, | Performed by: INTERNAL MEDICINE

## 2021-02-05 RX ORDER — COVID-19 MOLECULAR TEST ASSAY
KIT MISCELLANEOUS
COMMUNITY
Start: 2021-01-25 | End: 2021-03-26

## 2021-02-05 RX ORDER — AMITRIPTYLINE HYDROCHLORIDE 25 MG/1
25 TABLET, FILM COATED ORAL NIGHTLY PRN
Qty: 30 TABLET | Refills: 11
Start: 2021-02-05 | End: 2021-03-26 | Stop reason: SDUPTHER

## 2021-02-08 ENCOUNTER — HOSPITAL ENCOUNTER (OUTPATIENT)
Dept: RADIOLOGY | Facility: HOSPITAL | Age: 31
Discharge: HOME | End: 2021-02-08
Attending: INTERNAL MEDICINE
Payer: COMMERCIAL

## 2021-02-08 ENCOUNTER — TELEPHONE (OUTPATIENT)
Dept: INTERNAL MEDICINE | Facility: CLINIC | Age: 31
End: 2021-02-08

## 2021-02-08 DIAGNOSIS — R10.33 PERIUMBILICAL ABDOMINAL PAIN: ICD-10-CM

## 2021-02-08 PROCEDURE — 76705 ECHO EXAM OF ABDOMEN: CPT | Mod: TC

## 2021-02-08 PROCEDURE — 76705 ECHO EXAM OF ABDOMEN: CPT | Mod: 26,,, | Performed by: RADIOLOGY

## 2021-02-08 PROCEDURE — 76705 US SOFT TISSUE, ABDOMEN: ICD-10-PCS | Mod: 26,,, | Performed by: RADIOLOGY

## 2021-02-12 ENCOUNTER — PATIENT MESSAGE (OUTPATIENT)
Dept: ADMINISTRATIVE | Facility: OTHER | Age: 31
End: 2021-02-12

## 2021-02-12 ENCOUNTER — PATIENT MESSAGE (OUTPATIENT)
Dept: INTERNAL MEDICINE | Facility: CLINIC | Age: 31
End: 2021-02-12

## 2021-02-12 DIAGNOSIS — L02.211 ABSCESS OF SKIN OF ABDOMEN: Primary | ICD-10-CM

## 2021-02-12 RX ORDER — DOXYCYCLINE 100 MG/1
100 CAPSULE ORAL 2 TIMES DAILY
Qty: 14 CAPSULE | Refills: 0 | Status: SHIPPED | OUTPATIENT
Start: 2021-02-12 | End: 2021-05-12

## 2021-02-15 ENCOUNTER — OFFICE VISIT (OUTPATIENT)
Dept: SURGERY | Facility: CLINIC | Age: 31
End: 2021-02-15
Payer: COMMERCIAL

## 2021-02-15 VITALS
WEIGHT: 184.06 LBS | SYSTOLIC BLOOD PRESSURE: 147 MMHG | DIASTOLIC BLOOD PRESSURE: 95 MMHG | BODY MASS INDEX: 28.89 KG/M2 | HEIGHT: 67 IN | HEART RATE: 74 BPM

## 2021-02-15 DIAGNOSIS — L02.211 ABSCESS OF SKIN OF ABDOMEN: ICD-10-CM

## 2021-02-15 PROCEDURE — 3077F SYST BP >= 140 MM HG: CPT | Mod: CPTII,S$GLB,, | Performed by: STUDENT IN AN ORGANIZED HEALTH CARE EDUCATION/TRAINING PROGRAM

## 2021-02-15 PROCEDURE — 99213 PR OFFICE/OUTPT VISIT, EST, LEVL III, 20-29 MIN: ICD-10-PCS | Mod: S$GLB,,, | Performed by: STUDENT IN AN ORGANIZED HEALTH CARE EDUCATION/TRAINING PROGRAM

## 2021-02-15 PROCEDURE — 3077F PR MOST RECENT SYSTOLIC BLOOD PRESSURE >= 140 MM HG: ICD-10-PCS | Mod: CPTII,S$GLB,, | Performed by: STUDENT IN AN ORGANIZED HEALTH CARE EDUCATION/TRAINING PROGRAM

## 2021-02-15 PROCEDURE — 3080F PR MOST RECENT DIASTOLIC BLOOD PRESSURE >= 90 MM HG: ICD-10-PCS | Mod: CPTII,S$GLB,, | Performed by: STUDENT IN AN ORGANIZED HEALTH CARE EDUCATION/TRAINING PROGRAM

## 2021-02-15 PROCEDURE — 99213 OFFICE O/P EST LOW 20 MIN: CPT | Mod: S$GLB,,, | Performed by: STUDENT IN AN ORGANIZED HEALTH CARE EDUCATION/TRAINING PROGRAM

## 2021-02-15 PROCEDURE — 99999 PR PBB SHADOW E&M-EST. PATIENT-LVL III: ICD-10-PCS | Mod: PBBFAC,,, | Performed by: SURGERY

## 2021-02-15 PROCEDURE — 3008F BODY MASS INDEX DOCD: CPT | Mod: CPTII,S$GLB,, | Performed by: STUDENT IN AN ORGANIZED HEALTH CARE EDUCATION/TRAINING PROGRAM

## 2021-02-15 PROCEDURE — 3080F DIAST BP >= 90 MM HG: CPT | Mod: CPTII,S$GLB,, | Performed by: STUDENT IN AN ORGANIZED HEALTH CARE EDUCATION/TRAINING PROGRAM

## 2021-02-15 PROCEDURE — 3008F PR BODY MASS INDEX (BMI) DOCUMENTED: ICD-10-PCS | Mod: CPTII,S$GLB,, | Performed by: STUDENT IN AN ORGANIZED HEALTH CARE EDUCATION/TRAINING PROGRAM

## 2021-02-15 PROCEDURE — 1126F AMNT PAIN NOTED NONE PRSNT: CPT | Mod: S$GLB,,, | Performed by: STUDENT IN AN ORGANIZED HEALTH CARE EDUCATION/TRAINING PROGRAM

## 2021-02-15 PROCEDURE — 1126F PR PAIN SEVERITY QUANTIFIED, NO PAIN PRESENT: ICD-10-PCS | Mod: S$GLB,,, | Performed by: STUDENT IN AN ORGANIZED HEALTH CARE EDUCATION/TRAINING PROGRAM

## 2021-02-15 PROCEDURE — 99999 PR PBB SHADOW E&M-EST. PATIENT-LVL III: CPT | Mod: PBBFAC,,, | Performed by: SURGERY

## 2021-02-15 RX ORDER — OMEPRAZOLE 20 MG/1
20 CAPSULE, DELAYED RELEASE ORAL DAILY
COMMUNITY
End: 2021-06-18

## 2021-02-16 ENCOUNTER — PATIENT MESSAGE (OUTPATIENT)
Dept: SURGERY | Facility: CLINIC | Age: 31
End: 2021-02-16

## 2021-02-16 ENCOUNTER — PATIENT MESSAGE (OUTPATIENT)
Dept: INTERNAL MEDICINE | Facility: CLINIC | Age: 31
End: 2021-02-16

## 2021-02-23 ENCOUNTER — PATIENT MESSAGE (OUTPATIENT)
Dept: RHEUMATOLOGY | Facility: CLINIC | Age: 31
End: 2021-02-23

## 2021-03-10 ENCOUNTER — PATIENT MESSAGE (OUTPATIENT)
Dept: INTERNAL MEDICINE | Facility: CLINIC | Age: 31
End: 2021-03-10

## 2021-03-26 ENCOUNTER — OFFICE VISIT (OUTPATIENT)
Dept: UROLOGY | Facility: CLINIC | Age: 31
End: 2021-03-26
Payer: COMMERCIAL

## 2021-03-26 ENCOUNTER — TELEPHONE (OUTPATIENT)
Dept: NEUROLOGY | Facility: CLINIC | Age: 31
End: 2021-03-26

## 2021-03-26 VITALS
SYSTOLIC BLOOD PRESSURE: 141 MMHG | HEIGHT: 67 IN | WEIGHT: 177.69 LBS | HEART RATE: 93 BPM | BODY MASS INDEX: 27.89 KG/M2 | DIASTOLIC BLOOD PRESSURE: 98 MMHG

## 2021-03-26 DIAGNOSIS — N30.10 INTERSTITIAL CYSTITIS: Primary | ICD-10-CM

## 2021-03-26 PROCEDURE — 3080F PR MOST RECENT DIASTOLIC BLOOD PRESSURE >= 90 MM HG: ICD-10-PCS | Mod: CPTII,S$GLB,, | Performed by: UROLOGY

## 2021-03-26 PROCEDURE — 99999 PR PBB SHADOW E&M-EST. PATIENT-LVL III: CPT | Mod: PBBFAC,,, | Performed by: UROLOGY

## 2021-03-26 PROCEDURE — 99213 PR OFFICE/OUTPT VISIT, EST, LEVL III, 20-29 MIN: ICD-10-PCS | Mod: S$GLB,,, | Performed by: UROLOGY

## 2021-03-26 PROCEDURE — 3077F PR MOST RECENT SYSTOLIC BLOOD PRESSURE >= 140 MM HG: ICD-10-PCS | Mod: CPTII,S$GLB,, | Performed by: UROLOGY

## 2021-03-26 PROCEDURE — 3008F BODY MASS INDEX DOCD: CPT | Mod: CPTII,S$GLB,, | Performed by: UROLOGY

## 2021-03-26 PROCEDURE — 3008F PR BODY MASS INDEX (BMI) DOCUMENTED: ICD-10-PCS | Mod: CPTII,S$GLB,, | Performed by: UROLOGY

## 2021-03-26 PROCEDURE — 1126F PR PAIN SEVERITY QUANTIFIED, NO PAIN PRESENT: ICD-10-PCS | Mod: S$GLB,,, | Performed by: UROLOGY

## 2021-03-26 PROCEDURE — 3077F SYST BP >= 140 MM HG: CPT | Mod: CPTII,S$GLB,, | Performed by: UROLOGY

## 2021-03-26 PROCEDURE — 99213 OFFICE O/P EST LOW 20 MIN: CPT | Mod: S$GLB,,, | Performed by: UROLOGY

## 2021-03-26 PROCEDURE — 99999 PR PBB SHADOW E&M-EST. PATIENT-LVL III: ICD-10-PCS | Mod: PBBFAC,,, | Performed by: UROLOGY

## 2021-03-26 PROCEDURE — 3080F DIAST BP >= 90 MM HG: CPT | Mod: CPTII,S$GLB,, | Performed by: UROLOGY

## 2021-03-26 PROCEDURE — 1126F AMNT PAIN NOTED NONE PRSNT: CPT | Mod: S$GLB,,, | Performed by: UROLOGY

## 2021-03-26 RX ORDER — AMITRIPTYLINE HYDROCHLORIDE 25 MG/1
25 TABLET, FILM COATED ORAL NIGHTLY PRN
Qty: 30 TABLET | Refills: 11
Start: 2021-03-26 | End: 2022-03-29

## 2021-03-26 NOTE — TELEPHONE ENCOUNTER
----- Message from Marisel Duke MA sent at 3/26/2021  4:40 PM CDT -----  Good afternoon,    The attached patient was scheduled with Lissette Sanchez for on going headaches however he is a patient of Dr. Stone.  We informed the patient he would need to see Dr. Stone, however we weren't able to find an appointment.     Can someone please assist with getting the patient accommodated?       Thanks,    Marisel BARRIGA MA  [facility] [address] [facility] [address]  Phone:[phone]  Fax:[phone]

## 2021-03-29 ENCOUNTER — PATIENT MESSAGE (OUTPATIENT)
Dept: NEUROLOGY | Facility: CLINIC | Age: 31
End: 2021-03-29

## 2021-03-29 RX ORDER — IRBESARTAN 150 MG/1
150 TABLET ORAL NIGHTLY
Qty: 90 TABLET | Refills: 3 | Status: SHIPPED | OUTPATIENT
Start: 2021-03-29 | End: 2022-02-17 | Stop reason: SDUPTHER

## 2021-06-18 ENCOUNTER — OFFICE VISIT (OUTPATIENT)
Dept: GASTROENTEROLOGY | Facility: CLINIC | Age: 31
End: 2021-06-18
Payer: COMMERCIAL

## 2021-06-18 DIAGNOSIS — K21.9 GASTROESOPHAGEAL REFLUX DISEASE, UNSPECIFIED WHETHER ESOPHAGITIS PRESENT: Primary | ICD-10-CM

## 2021-06-18 PROCEDURE — 99214 PR OFFICE/OUTPT VISIT, EST, LEVL IV, 30-39 MIN: ICD-10-PCS | Mod: 95,,, | Performed by: INTERNAL MEDICINE

## 2021-06-18 PROCEDURE — 99214 OFFICE O/P EST MOD 30 MIN: CPT | Mod: 95,,, | Performed by: INTERNAL MEDICINE

## 2021-06-18 RX ORDER — OMEPRAZOLE 40 MG/1
40 CAPSULE, DELAYED RELEASE ORAL DAILY
Qty: 30 CAPSULE | Refills: 11 | Status: SHIPPED | OUTPATIENT
Start: 2021-06-18 | End: 2022-03-29

## 2021-06-28 ENCOUNTER — OFFICE VISIT (OUTPATIENT)
Dept: INTERNAL MEDICINE | Facility: CLINIC | Age: 31
End: 2021-06-28
Payer: COMMERCIAL

## 2021-06-28 ENCOUNTER — PATIENT MESSAGE (OUTPATIENT)
Dept: INTERNAL MEDICINE | Facility: CLINIC | Age: 31
End: 2021-06-28

## 2021-06-28 VITALS
OXYGEN SATURATION: 98 % | HEART RATE: 87 BPM | DIASTOLIC BLOOD PRESSURE: 82 MMHG | WEIGHT: 188.5 LBS | BODY MASS INDEX: 29.58 KG/M2 | HEIGHT: 67 IN | SYSTOLIC BLOOD PRESSURE: 120 MMHG | TEMPERATURE: 98 F

## 2021-06-28 DIAGNOSIS — R20.2 PARESTHESIAS: ICD-10-CM

## 2021-06-28 PROCEDURE — 99213 OFFICE O/P EST LOW 20 MIN: CPT | Mod: S$GLB,,, | Performed by: INTERNAL MEDICINE

## 2021-06-28 PROCEDURE — 99999 PR PBB SHADOW E&M-EST. PATIENT-LVL III: CPT | Mod: PBBFAC,,, | Performed by: INTERNAL MEDICINE

## 2021-06-28 PROCEDURE — 99999 PR PBB SHADOW E&M-EST. PATIENT-LVL III: ICD-10-PCS | Mod: PBBFAC,,, | Performed by: INTERNAL MEDICINE

## 2021-06-28 PROCEDURE — 99213 PR OFFICE/OUTPT VISIT, EST, LEVL III, 20-29 MIN: ICD-10-PCS | Mod: S$GLB,,, | Performed by: INTERNAL MEDICINE

## 2021-06-28 PROCEDURE — 3008F BODY MASS INDEX DOCD: CPT | Mod: CPTII,S$GLB,, | Performed by: INTERNAL MEDICINE

## 2021-06-28 PROCEDURE — 3008F PR BODY MASS INDEX (BMI) DOCUMENTED: ICD-10-PCS | Mod: CPTII,S$GLB,, | Performed by: INTERNAL MEDICINE

## 2021-06-28 PROCEDURE — 1125F PR PAIN SEVERITY QUANTIFIED, PAIN PRESENT: ICD-10-PCS | Mod: S$GLB,,, | Performed by: INTERNAL MEDICINE

## 2021-06-28 PROCEDURE — 1125F AMNT PAIN NOTED PAIN PRSNT: CPT | Mod: S$GLB,,, | Performed by: INTERNAL MEDICINE

## 2021-08-09 ENCOUNTER — PATIENT MESSAGE (OUTPATIENT)
Dept: INTERNAL MEDICINE | Facility: CLINIC | Age: 31
End: 2021-08-09

## 2021-08-10 ENCOUNTER — PATIENT MESSAGE (OUTPATIENT)
Dept: INTERNAL MEDICINE | Facility: CLINIC | Age: 31
End: 2021-08-10

## 2021-08-23 ENCOUNTER — OFFICE VISIT (OUTPATIENT)
Dept: OTOLARYNGOLOGY | Facility: CLINIC | Age: 31
End: 2021-08-23
Payer: COMMERCIAL

## 2021-08-23 DIAGNOSIS — J01.90 ACUTE RHINOSINUSITIS: Primary | ICD-10-CM

## 2021-08-23 PROCEDURE — 1160F PR REVIEW ALL MEDS BY PRESCRIBER/CLIN PHARMACIST DOCUMENTED: ICD-10-PCS | Mod: CPTII,S$GLB,, | Performed by: NURSE PRACTITIONER

## 2021-08-23 PROCEDURE — 1160F RVW MEDS BY RX/DR IN RCRD: CPT | Mod: CPTII,S$GLB,, | Performed by: NURSE PRACTITIONER

## 2021-08-23 PROCEDURE — 1159F MED LIST DOCD IN RCRD: CPT | Mod: CPTII,S$GLB,, | Performed by: NURSE PRACTITIONER

## 2021-08-23 PROCEDURE — 1126F PR PAIN SEVERITY QUANTIFIED, NO PAIN PRESENT: ICD-10-PCS | Mod: CPTII,S$GLB,, | Performed by: NURSE PRACTITIONER

## 2021-08-23 PROCEDURE — 99999 PR PBB SHADOW E&M-EST. PATIENT-LVL III: ICD-10-PCS | Mod: PBBFAC,,, | Performed by: NURSE PRACTITIONER

## 2021-08-23 PROCEDURE — 99213 PR OFFICE/OUTPT VISIT, EST, LEVL III, 20-29 MIN: ICD-10-PCS | Mod: S$GLB,,, | Performed by: NURSE PRACTITIONER

## 2021-08-23 PROCEDURE — 99999 PR PBB SHADOW E&M-EST. PATIENT-LVL III: CPT | Mod: PBBFAC,,, | Performed by: NURSE PRACTITIONER

## 2021-08-23 PROCEDURE — 99213 OFFICE O/P EST LOW 20 MIN: CPT | Mod: S$GLB,,, | Performed by: NURSE PRACTITIONER

## 2021-08-23 PROCEDURE — 1159F PR MEDICATION LIST DOCUMENTED IN MEDICAL RECORD: ICD-10-PCS | Mod: CPTII,S$GLB,, | Performed by: NURSE PRACTITIONER

## 2021-08-23 PROCEDURE — 1126F AMNT PAIN NOTED NONE PRSNT: CPT | Mod: CPTII,S$GLB,, | Performed by: NURSE PRACTITIONER

## 2021-08-23 RX ORDER — FLUTICASONE PROPIONATE 50 MCG
2 SPRAY, SUSPENSION (ML) NASAL DAILY
Qty: 16 G | Refills: 2 | Status: SHIPPED | OUTPATIENT
Start: 2021-08-23 | End: 2021-11-21

## 2021-09-07 ENCOUNTER — PATIENT MESSAGE (OUTPATIENT)
Dept: INTERNAL MEDICINE | Facility: CLINIC | Age: 31
End: 2021-09-07

## 2021-11-22 ENCOUNTER — IMMUNIZATION (OUTPATIENT)
Dept: INTERNAL MEDICINE | Facility: CLINIC | Age: 31
End: 2021-11-22
Payer: COMMERCIAL

## 2021-11-22 DIAGNOSIS — Z23 NEED FOR VACCINATION: Primary | ICD-10-CM

## 2021-11-22 PROCEDURE — 0004A COVID-19, MRNA, LNP-S, PF, 30 MCG/0.3 ML DOSE VACCINE: CPT | Mod: PBBFAC | Performed by: INTERNAL MEDICINE

## 2021-12-01 NOTE — ED NOTES
Attempted to call report. Unsuccessful.  
B KIMBER Titus notified of pt complaints of severe epigstric pain  
Called Charge nurse for [facility] who is aware of patient need to be in his room. Spoke with Lizz in surgery to inform her where patient would be with surgery pending ASAP. Tech delivered patient to [facility]  
Donal Serrano, a 28 y.o. male presents to the ED via ambulatory  with CC  Right lower abd pain, bloating      Patient identifiers verified verbally with arm band and correct for Donal Serrano.    LOC/ APPEARANCE: The patient is awake, alert and oriented x 4. Pt is speaking appropriately, no slurred speech. Patient resting comfortably and in no acute distress. Pt is clean and well groomed. No JVD visible. Pt reports pain level of 0. Pt updated on POC. Bed low and locked with side rails up x2, call bell in pt reach.  SKIN: Skin is warm dry and intact, and color is consistent with ethnicity. Capillary refill <3 seconds. No breakdown or brusing visible and mucus membranes moist and acyanotic.  MUSCULOSKELETAL: Full range of motion present in all extremities. Hand  equal and leg strength strong +5 bilaterally.  RESPIRATORY: Airway is open and patent. Respirations-unlabored, regular rate, equal bilaterally on inspiration and expiration. No accessory muscle use noted. Lungs clear to auscultation in all fields bilaterally anterior and posterior.   CARDIAC: Patient has regular heart rate.  No peripheral edema noted, and patient has no c/o chest pain. Peripheral pulses present equal and strong throughout.  ABDOMEN: Soft and non-tender to palpation with  distention noted. Normoactive bowel sounds x4 quadrants. Pt has no complaints of abnormal bowel movements, denies blood in stool. Pt reports normal appetite. CO right lower quad pain 8/10  NEUROLOGIC: Eyes open spontaneously and facial expression symmetrical. Pt behavior appropriate to situation, and pt follows commands.  Pt reports sensation present in all extremities when touched with a finger. PERRLA  : No complaints of frequency, burning, urgency or blood in the urine. No complaints of incontinence.  
KIMBER Ko is with pt   
Patient states that he is unable to void at this time.  
Spoke with KIMBER Liz who said she would inform Dr Loving of severe epigastric pain 10/10  
Statement Selected

## 2022-01-06 ENCOUNTER — LAB VISIT (OUTPATIENT)
Dept: PRIMARY CARE CLINIC | Facility: CLINIC | Age: 32
End: 2022-01-06
Payer: COMMERCIAL

## 2022-01-06 ENCOUNTER — PATIENT MESSAGE (OUTPATIENT)
Dept: ADMINISTRATIVE | Facility: OTHER | Age: 32
End: 2022-01-06
Payer: COMMERCIAL

## 2022-01-06 DIAGNOSIS — Z20.822 CONTACT WITH AND (SUSPECTED) EXPOSURE TO COVID-19: ICD-10-CM

## 2022-01-06 LAB
CTP QC/QA: YES
SARS-COV-2 AG RESP QL IA.RAPID: NEGATIVE

## 2022-01-06 PROCEDURE — 87811 SARS-COV-2 COVID19 W/OPTIC: CPT

## 2022-02-16 ENCOUNTER — PATIENT MESSAGE (OUTPATIENT)
Dept: INTERNAL MEDICINE | Facility: CLINIC | Age: 32
End: 2022-02-16
Payer: COMMERCIAL

## 2022-02-17 RX ORDER — IRBESARTAN 150 MG/1
150 TABLET ORAL NIGHTLY
Qty: 90 TABLET | Refills: 3 | Status: SHIPPED | OUTPATIENT
Start: 2022-02-17 | End: 2022-12-01

## 2022-02-17 NOTE — TELEPHONE ENCOUNTER
"The patient states " I am requesting a renewed prescription for my blood pressure medicine. Recently, [facility] contacted me that they are unable to refill it until the prescription is renewed by physician."        Please advise, Thank you.    "

## 2022-03-26 NOTE — PROGRESS NOTES
CC: ANNUAL PE    32 y.o. male presents for Annual PE  Nonsmoker  Non ETOH      HEALTH MAINTENANCE:  Cholesterol/lab: pending  C-scope: n/a  PSA/prostate:n/a  EYE exam: UTD  DDS exam: UTD    VACCINATIONS:  Tdap: 2016  Pneumovax: n/a  Prevnar:n/a  Shingles: n/a  Flu: yearly  Covid: 3/3    MEDCARD: Reviewed    ROS:  No fever, chills, or night sweats  No visual disturbance or d/c  No ear or sinus pain or pressure  No dysphagia or early satiety  No chest pain or palpitations  No cough or wheezing  No GERD or abdominal pain  No change in bowels or blood in stool  No hematuria or dysuria;  + nocturia  No difficulty starting or stopping urinary stream  No skin rashes or lesions  No joint swelling or erythema  No unusual HA's or focal deficits  No cold or heat intolerance  No increased thirst or urination  No unusual bruising or bleeding    Remainder of review negative except as previously noted.    PMHX:Reviewed  PSHX:Reviewed  SHX:Reviewed    PHYSICAL EXAM:  VS: As noted  GENERAL: Well developed well nourished, alert and oriented, NAD  Conversant and co-operative.  EYES: Conjunctiva/lids unremarkable, sclera anicteric, PERRL, EOMI  ENT: Hearing intact.   NECK: Supple w/o lymphadenopathy or thyromegaly  RESPIRATORY: Efforts unlabored, lungs CTAP  CARDIOVASCULAR: Heart RRR, w/o mumur, gallop, or rub. No carotid bruits noted  1+ pedal pulses. No LE edema noted  GASTROINTESTINAL: Bowel sounds +, soft, nontender, nondistended,   no hepatosplenomegaly  Noted  MUSCULOSKELETAL: Gait normal. No CCE.  NEUROLOGY: MAZARIEGOS. No tremor noted.  SKIN: Warm and dry    IMPRESSION:  Annual PE  HTN, slight elevation  HA-migraine/tension  Vitamin D deficiency  GERD, stable- takes PPI 3-4 x yearly for ave of one week    PLAN:  Fasting lab  Flu vaccine  Monitor BP and call in a couple of weeks w/ updated readings  IF remains elevated will increase the irbesartan to 300mg  Call prn  RTC 6 mos routine w/ lab

## 2022-03-29 ENCOUNTER — OFFICE VISIT (OUTPATIENT)
Dept: INTERNAL MEDICINE | Facility: CLINIC | Age: 32
End: 2022-03-29
Payer: COMMERCIAL

## 2022-03-29 VITALS
WEIGHT: 186.06 LBS | SYSTOLIC BLOOD PRESSURE: 120 MMHG | BODY MASS INDEX: 29.2 KG/M2 | HEART RATE: 72 BPM | RESPIRATION RATE: 16 BRPM | OXYGEN SATURATION: 97 % | HEIGHT: 67 IN | DIASTOLIC BLOOD PRESSURE: 94 MMHG

## 2022-03-29 DIAGNOSIS — E55.9 VITAMIN D DEFICIENCY: ICD-10-CM

## 2022-03-29 DIAGNOSIS — Z00.00 ANNUAL PHYSICAL EXAM: Primary | ICD-10-CM

## 2022-03-29 DIAGNOSIS — I10 ESSENTIAL HYPERTENSION: ICD-10-CM

## 2022-03-29 PROCEDURE — 3074F SYST BP LT 130 MM HG: CPT | Mod: CPTII,S$GLB,, | Performed by: INTERNAL MEDICINE

## 2022-03-29 PROCEDURE — 90686 FLU VACCINE (QUAD) GREATER THAN OR EQUAL TO 3YO PRESERVATIVE FREE IM: ICD-10-PCS | Mod: S$GLB,,, | Performed by: INTERNAL MEDICINE

## 2022-03-29 PROCEDURE — 99395 PREV VISIT EST AGE 18-39: CPT | Mod: 25,S$GLB,, | Performed by: INTERNAL MEDICINE

## 2022-03-29 PROCEDURE — 90471 FLU VACCINE (QUAD) GREATER THAN OR EQUAL TO 3YO PRESERVATIVE FREE IM: ICD-10-PCS | Mod: S$GLB,,, | Performed by: INTERNAL MEDICINE

## 2022-03-29 PROCEDURE — 99395 PR PREVENTIVE VISIT,EST,18-39: ICD-10-PCS | Mod: 25,S$GLB,, | Performed by: INTERNAL MEDICINE

## 2022-03-29 PROCEDURE — 90686 IIV4 VACC NO PRSV 0.5 ML IM: CPT | Mod: S$GLB,,, | Performed by: INTERNAL MEDICINE

## 2022-03-29 PROCEDURE — 99999 PR PBB SHADOW E&M-EST. PATIENT-LVL III: ICD-10-PCS | Mod: PBBFAC,,, | Performed by: INTERNAL MEDICINE

## 2022-03-29 PROCEDURE — 3080F DIAST BP >= 90 MM HG: CPT | Mod: CPTII,S$GLB,, | Performed by: INTERNAL MEDICINE

## 2022-03-29 PROCEDURE — 1159F PR MEDICATION LIST DOCUMENTED IN MEDICAL RECORD: ICD-10-PCS | Mod: CPTII,S$GLB,, | Performed by: INTERNAL MEDICINE

## 2022-03-29 PROCEDURE — 4010F ACE/ARB THERAPY RXD/TAKEN: CPT | Mod: CPTII,S$GLB,, | Performed by: INTERNAL MEDICINE

## 2022-03-29 PROCEDURE — 90471 IMMUNIZATION ADMIN: CPT | Mod: S$GLB,,, | Performed by: INTERNAL MEDICINE

## 2022-03-29 PROCEDURE — 4010F PR ACE/ARB THEARPY RXD/TAKEN: ICD-10-PCS | Mod: CPTII,S$GLB,, | Performed by: INTERNAL MEDICINE

## 2022-03-29 PROCEDURE — 3080F PR MOST RECENT DIASTOLIC BLOOD PRESSURE >= 90 MM HG: ICD-10-PCS | Mod: CPTII,S$GLB,, | Performed by: INTERNAL MEDICINE

## 2022-03-29 PROCEDURE — 3074F PR MOST RECENT SYSTOLIC BLOOD PRESSURE < 130 MM HG: ICD-10-PCS | Mod: CPTII,S$GLB,, | Performed by: INTERNAL MEDICINE

## 2022-03-29 PROCEDURE — 3008F BODY MASS INDEX DOCD: CPT | Mod: CPTII,S$GLB,, | Performed by: INTERNAL MEDICINE

## 2022-03-29 PROCEDURE — 3008F PR BODY MASS INDEX (BMI) DOCUMENTED: ICD-10-PCS | Mod: CPTII,S$GLB,, | Performed by: INTERNAL MEDICINE

## 2022-03-29 PROCEDURE — 1159F MED LIST DOCD IN RCRD: CPT | Mod: CPTII,S$GLB,, | Performed by: INTERNAL MEDICINE

## 2022-03-29 PROCEDURE — 99999 PR PBB SHADOW E&M-EST. PATIENT-LVL III: CPT | Mod: PBBFAC,,, | Performed by: INTERNAL MEDICINE

## 2022-04-05 ENCOUNTER — LAB VISIT (OUTPATIENT)
Dept: LAB | Facility: HOSPITAL | Age: 32
End: 2022-04-05
Attending: INTERNAL MEDICINE
Payer: COMMERCIAL

## 2022-04-05 DIAGNOSIS — Z00.00 ANNUAL PHYSICAL EXAM: ICD-10-CM

## 2022-04-05 DIAGNOSIS — E55.9 VITAMIN D DEFICIENCY: ICD-10-CM

## 2022-04-05 LAB
25(OH)D3+25(OH)D2 SERPL-MCNC: 34 NG/ML (ref 30–96)
ALBUMIN SERPL BCP-MCNC: 4.2 G/DL (ref 3.5–5.2)
ALP SERPL-CCNC: 79 U/L (ref 55–135)
ALT SERPL W/O P-5'-P-CCNC: 32 U/L (ref 10–44)
ANION GAP SERPL CALC-SCNC: 12 MMOL/L (ref 8–16)
AST SERPL-CCNC: 21 U/L (ref 10–40)
BASOPHILS # BLD AUTO: 0.04 K/UL (ref 0–0.2)
BASOPHILS NFR BLD: 0.6 % (ref 0–1.9)
BILIRUB SERPL-MCNC: 0.7 MG/DL (ref 0.1–1)
BUN SERPL-MCNC: 10 MG/DL (ref 6–20)
CALCIUM SERPL-MCNC: 9.8 MG/DL (ref 8.7–10.5)
CHLORIDE SERPL-SCNC: 105 MMOL/L (ref 95–110)
CHOLEST SERPL-MCNC: 160 MG/DL (ref 120–199)
CHOLEST/HDLC SERPL: 3.7 {RATIO} (ref 2–5)
CO2 SERPL-SCNC: 20 MMOL/L (ref 23–29)
CREAT SERPL-MCNC: 0.9 MG/DL (ref 0.5–1.4)
DIFFERENTIAL METHOD: ABNORMAL
EOSINOPHIL # BLD AUTO: 0.2 K/UL (ref 0–0.5)
EOSINOPHIL NFR BLD: 3.1 % (ref 0–8)
ERYTHROCYTE [DISTWIDTH] IN BLOOD BY AUTOMATED COUNT: 12.2 % (ref 11.5–14.5)
EST. GFR  (AFRICAN AMERICAN): >60 ML/MIN/1.73 M^2
EST. GFR  (NON AFRICAN AMERICAN): >60 ML/MIN/1.73 M^2
GLUCOSE SERPL-MCNC: 84 MG/DL (ref 70–110)
HCT VFR BLD AUTO: 47.3 % (ref 40–54)
HDLC SERPL-MCNC: 43 MG/DL (ref 40–75)
HDLC SERPL: 26.9 % (ref 20–50)
HGB BLD-MCNC: 15.5 G/DL (ref 14–18)
IMM GRANULOCYTES # BLD AUTO: 0.06 K/UL (ref 0–0.04)
IMM GRANULOCYTES NFR BLD AUTO: 0.8 % (ref 0–0.5)
LDLC SERPL CALC-MCNC: 104.2 MG/DL (ref 63–159)
LYMPHOCYTES # BLD AUTO: 2.1 K/UL (ref 1–4.8)
LYMPHOCYTES NFR BLD: 29.4 % (ref 18–48)
MCH RBC QN AUTO: 28.9 PG (ref 27–31)
MCHC RBC AUTO-ENTMCNC: 32.8 G/DL (ref 32–36)
MCV RBC AUTO: 88 FL (ref 82–98)
MONOCYTES # BLD AUTO: 0.7 K/UL (ref 0.3–1)
MONOCYTES NFR BLD: 9 % (ref 4–15)
NEUTROPHILS # BLD AUTO: 4.1 K/UL (ref 1.8–7.7)
NEUTROPHILS NFR BLD: 57.1 % (ref 38–73)
NONHDLC SERPL-MCNC: 117 MG/DL
NRBC BLD-RTO: 0 /100 WBC
PLATELET # BLD AUTO: 225 K/UL (ref 150–450)
PMV BLD AUTO: 9.9 FL (ref 9.2–12.9)
POTASSIUM SERPL-SCNC: 3.9 MMOL/L (ref 3.5–5.1)
PROT SERPL-MCNC: 7.8 G/DL (ref 6–8.4)
RBC # BLD AUTO: 5.36 M/UL (ref 4.6–6.2)
SODIUM SERPL-SCNC: 137 MMOL/L (ref 136–145)
TRIGL SERPL-MCNC: 64 MG/DL (ref 30–150)
TSH SERPL DL<=0.005 MIU/L-ACNC: 1.4 UIU/ML (ref 0.4–4)
WBC # BLD AUTO: 7.2 K/UL (ref 3.9–12.7)

## 2022-04-05 PROCEDURE — 80053 COMPREHEN METABOLIC PANEL: CPT | Performed by: INTERNAL MEDICINE

## 2022-04-05 PROCEDURE — 36415 COLL VENOUS BLD VENIPUNCTURE: CPT | Mod: PO | Performed by: INTERNAL MEDICINE

## 2022-04-05 PROCEDURE — 80061 LIPID PANEL: CPT | Performed by: INTERNAL MEDICINE

## 2022-04-05 PROCEDURE — 84443 ASSAY THYROID STIM HORMONE: CPT | Performed by: INTERNAL MEDICINE

## 2022-04-05 PROCEDURE — 82306 VITAMIN D 25 HYDROXY: CPT | Performed by: INTERNAL MEDICINE

## 2022-04-05 PROCEDURE — 87389 HIV-1 AG W/HIV-1&-2 AB AG IA: CPT | Performed by: INTERNAL MEDICINE

## 2022-04-05 PROCEDURE — 85025 COMPLETE CBC W/AUTO DIFF WBC: CPT | Performed by: INTERNAL MEDICINE

## 2022-04-06 LAB — HIV 1+2 AB+HIV1 P24 AG SERPL QL IA: NEGATIVE

## 2022-04-08 ENCOUNTER — TELEPHONE (OUTPATIENT)
Dept: INTERNAL MEDICINE | Facility: CLINIC | Age: 32
End: 2022-04-08
Payer: COMMERCIAL

## 2022-04-08 NOTE — TELEPHONE ENCOUNTER
----- Message from Kimi Valladares MD sent at 4/7/2022  9:00 PM CDT -----  Your lab has resulted and your urine specimen was unremarkable.  As anticipated your HIV test was negative.  Your vitamin-D level is in the normal range but it has declined slightly, please continue to take vitamin-D 3 daily.  Your thyroid test was in the normal range.  Your chemistries were unremarkable including kidney, liver, and glucose.  Your total cholesterol was 160, with a slight decrease in your good, HDL, cholesterol and a slight increase in the bad, LDL, cholesterol.  Please continue to eat a healthy diet, keep well hydrated, and exercise to keep these levels in the normal range.  Your CBC was unremarkable, your white blood cells that fight infection were normal and you are not anemic.  Please do not hesitate to call/email if you have any questions or concerns .  Kimi Aguila

## 2022-04-20 NOTE — PROGRESS NOTES
----- Message from Leoncio Blue MD sent at 4/20/2022  7:10 AM CDT -----  Please notify the patient of stable results.  Follow-up as planned.   [facility]    REFERRING PROVIDER: Dr. Baumgarten    CHIEF COMPLAINT: elevated liver enzymes    HPI: This is a 28 y.o. White male with PMH as below referred for elevated liver enzymes. Here with his mother today. He was recently in [place] on safari and developed abd pain and diarrhea. He was treated with tinidazole and norfloxacin and was also on Malarone. He was seen by ID and surgery here upon return and found to have possible acute appendicitis. He was given cipro and Flagyl and has been improving.     He had labs that showed elevated transaminases and alk phos that looked to be improving then increased slightly again last week. He has u/s scheduled for tomorrow. CT on  and  show normal liver and bile ducts. Acute viral hepatitis panel negative . He has been vaccinated for hepatitis A and B. Has not had hepatitis E testing. He does not drink alcohol. No other med changes other than noted above. Does have family h/o liver disease - dad with KHALIL cirrhosis and HCC. Had liver transplant in  but  from complications from his liver disease shortly after transplant.          Pt works as a  and travels frequently. He is leaving [place] for [place].       Denies jaundice, dark urine, abdominal distention, hematemesis, melena, slowed mentation. No abnormal skin rashes. No generalized joint or muscle pain.      Review of patient's allergies indicates:  No Known Allergies    No current outpatient prescriptions on file prior to visit.     No current facility-administered medications on file prior to visit.        PMHX:  has a past medical history of Hypertension; Keloid cicatrix; and Kidney stones.    PSHX:  has a past surgical history that includes Cholecystectomy (2012).    FAMILY HISTORY: Negative for liver disease, reviewed in [facility]    SOCIAL HISTORY:   History   Smoking Status    Never Smoker   Smokeless Tobacco    Never Used       History   Alcohol Use No  "      History   Drug Use No         ROS:   GENERAL: Denies fever, chills, weight loss/gain, fatigue  HEENT: Denies headaches, dizziness, vision/hearing changes  CARDIOVASCULAR: Denies chest pain, palpitations, or edema  RESPIRATORY: Denies dyspnea, cough  GI: (+) abdominal pain, no rectal bleeding, nausea, vomiting. No change in bowel pattern or color  : Denies dysuria, hematuria   SKIN: Denies rash, itching   NEURO: Denies confusion, memory loss, or mood changes  PSYCH: Denies depression or anxiety  HEME/LYMPH: Denies easy bruising or bleeding        PHYSICAL EXAM:   Friendly White male, in no acute distress; alert and oriented to person, place and time  VITALS: BP (!) 145/90 (BP Location: Left arm, Patient Position: Sitting)   Pulse 85   Resp 18   Ht 5' 7" (1.702 m)   Wt 73.5 kg (162 lb 0.6 oz)   SpO2 96%   BMI 25.38 kg/m²   HENT: Normocephalic, without obvious abnormality. Oral mucosa pink and moist. Dentition good.  EYES: Sclerae anicteric. No conjunctival pallor.   NECK: Supple. No masses or cervical adenopathy.  CARDIOVASCULAR: Regular rate and rhythm. No murmurs.  RESPIRATORY: Normal respiratory effort. BBS CTA. No wheezes or crackles.  GI: Soft, non-tender, non-distended. No hepatosplenomegaly. No masses palpable. No ascites.  EXTREMITIES:  No clubbing, cyanosis or edema.  SKIN: Warm and dry. No jaundice. No rashes noted to exposed skin. No telangectasias noted. No palmar erythema.  NEURO:  Normal gate. No asterixis.  PSYCH:  Memory intact. Thought and speech pattern appropriate. Behavior normal. No depression or anxiety noted.      RECENT LABS:    Labs:  Lab Results   Component Value Date    WBC 6.06 07/19/2018    HGB 14.6 07/19/2018    HCT 44.7 07/19/2018     07/19/2018    CHOL 162 02/26/2018    TRIG 81 02/26/2018    HDL 48 02/26/2018     07/19/2018    K 4.1 07/19/2018    CREATININE 0.9 07/19/2018     (H) 07/19/2018     (H) 07/19/2018    ALKPHOS 66 07/19/2018    BILITOT " 0.8 07/19/2018    ALBUMIN 4.3 07/19/2018       DIAGNOSTIC STUDIES:  CT SCAN- 7/19/18  FINDINGS:  The liver, spleen, adrenal glands and kidneys are unremarkable.  There is no evidence bowel obstruction.  There is no evidence of abdominal or pelvic lymphadenopathy.  There is stool seen throughout the colon.  The appendix is within normal limits.    The osseous structures are unremarkable.   Impression       The appendix is within normal limits.       ASSESSMENT:  28 y.o. White male with:  1.  Elevated liver enzymes  -- suspect likely infectious related and will resolve with time  -- will check labs to r/o autoimmune etiology and hepatitis E  -- will monitor to ensure normalization      PLAN:  1. Labs today  2. Labs in 1 and 2 weeks for hepatic panel  3. No f/u needed if enzymes normalize and remain normal      Thank you for allowing me to participate in the care of Donal Ricardo, NP-C

## 2022-06-07 ENCOUNTER — TELEPHONE (OUTPATIENT)
Dept: INTERNAL MEDICINE | Facility: CLINIC | Age: 32
End: 2022-06-07
Payer: COMMERCIAL

## 2022-06-09 ENCOUNTER — PATIENT MESSAGE (OUTPATIENT)
Dept: SPORTS MEDICINE | Facility: CLINIC | Age: 32
End: 2022-06-09
Payer: COMMERCIAL

## 2022-08-16 ENCOUNTER — OFFICE VISIT (OUTPATIENT)
Dept: URGENT CARE | Facility: CLINIC | Age: 32
End: 2022-08-16
Payer: COMMERCIAL

## 2022-08-16 VITALS
DIASTOLIC BLOOD PRESSURE: 98 MMHG | SYSTOLIC BLOOD PRESSURE: 145 MMHG | TEMPERATURE: 98 F | RESPIRATION RATE: 18 BRPM | HEART RATE: 74 BPM | BODY MASS INDEX: 29.19 KG/M2 | OXYGEN SATURATION: 96 % | HEIGHT: 67 IN | WEIGHT: 186 LBS

## 2022-08-16 DIAGNOSIS — R05.8 COUGH DUE TO ACE INHIBITOR: Primary | ICD-10-CM

## 2022-08-16 DIAGNOSIS — U07.1 LAB TEST POSITIVE FOR DETECTION OF COVID-19 VIRUS: ICD-10-CM

## 2022-08-16 DIAGNOSIS — U07.1 COVID-19 VIRUS DETECTED: ICD-10-CM

## 2022-08-16 DIAGNOSIS — T46.4X5A COUGH DUE TO ACE INHIBITOR: Primary | ICD-10-CM

## 2022-08-16 LAB
CTP QC/QA: YES
SARS-COV-2 RDRP RESP QL NAA+PROBE: POSITIVE

## 2022-08-16 PROCEDURE — U0002: ICD-10-PCS | Mod: QW,S$GLB,, | Performed by: FAMILY MEDICINE

## 2022-08-16 PROCEDURE — 99213 OFFICE O/P EST LOW 20 MIN: CPT | Mod: S$GLB,,, | Performed by: FAMILY MEDICINE

## 2022-08-16 PROCEDURE — 3080F DIAST BP >= 90 MM HG: CPT | Mod: CPTII,S$GLB,, | Performed by: FAMILY MEDICINE

## 2022-08-16 PROCEDURE — 1159F MED LIST DOCD IN RCRD: CPT | Mod: CPTII,S$GLB,, | Performed by: FAMILY MEDICINE

## 2022-08-16 PROCEDURE — 1159F PR MEDICATION LIST DOCUMENTED IN MEDICAL RECORD: ICD-10-PCS | Mod: CPTII,S$GLB,, | Performed by: FAMILY MEDICINE

## 2022-08-16 PROCEDURE — 99213 PR OFFICE/OUTPT VISIT, EST, LEVL III, 20-29 MIN: ICD-10-PCS | Mod: S$GLB,,, | Performed by: FAMILY MEDICINE

## 2022-08-16 PROCEDURE — 3008F PR BODY MASS INDEX (BMI) DOCUMENTED: ICD-10-PCS | Mod: CPTII,S$GLB,, | Performed by: FAMILY MEDICINE

## 2022-08-16 PROCEDURE — 3077F SYST BP >= 140 MM HG: CPT | Mod: CPTII,S$GLB,, | Performed by: FAMILY MEDICINE

## 2022-08-16 PROCEDURE — 3080F PR MOST RECENT DIASTOLIC BLOOD PRESSURE >= 90 MM HG: ICD-10-PCS | Mod: CPTII,S$GLB,, | Performed by: FAMILY MEDICINE

## 2022-08-16 PROCEDURE — 4010F ACE/ARB THERAPY RXD/TAKEN: CPT | Mod: CPTII,S$GLB,, | Performed by: FAMILY MEDICINE

## 2022-08-16 PROCEDURE — 3077F PR MOST RECENT SYSTOLIC BLOOD PRESSURE >= 140 MM HG: ICD-10-PCS | Mod: CPTII,S$GLB,, | Performed by: FAMILY MEDICINE

## 2022-08-16 PROCEDURE — 3008F BODY MASS INDEX DOCD: CPT | Mod: CPTII,S$GLB,, | Performed by: FAMILY MEDICINE

## 2022-08-16 PROCEDURE — U0002 COVID-19 LAB TEST NON-CDC: HCPCS | Mod: QW,S$GLB,, | Performed by: FAMILY MEDICINE

## 2022-08-16 PROCEDURE — 4010F PR ACE/ARB THEARPY RXD/TAKEN: ICD-10-PCS | Mod: CPTII,S$GLB,, | Performed by: FAMILY MEDICINE

## 2022-08-16 RX ORDER — LORATADINE 10 MG/1
10 TABLET ORAL DAILY
Qty: 30 TABLET | Refills: 2 | Status: SHIPPED | OUTPATIENT
Start: 2022-08-16 | End: 2023-03-21

## 2022-08-16 RX ORDER — BENZONATATE 100 MG/1
200 CAPSULE ORAL 3 TIMES DAILY PRN
Qty: 30 CAPSULE | Refills: 1 | Status: SHIPPED | OUTPATIENT
Start: 2022-08-16 | End: 2023-03-21

## 2022-08-16 NOTE — PROGRESS NOTES
Subjective:       Patient ID: Donal Serrano is a 32 y.o. male.    Chief Complaint: Sore Throat (Dry Cough - Entered by patient)    HPI  ROS     Objective:      Physical Exam    Assessment:       No diagnosis found.    Plan:                   No follow-ups on file.

## 2022-08-16 NOTE — PATIENT INSTRUCTIONS
Your test was POSITIVE for COVID-19 (coronavirus).       Please isolate yourself at home.  You may leave home and/or return to work once the following conditions are met:    If you were not hospitalized and are not moderately to severely immunocompromised:   More than 5 days since symptoms first appeared AND  More than 24 hours fever free without medications AND  Symptoms are improving  Continue to wear a mask around others for 5 additional days.    If you were hospitalized OR are moderately to severely immunocompromised:  More than 20 days since symptoms first appeared  More than 24 hours fever free without medications  Symptoms have improved    If you had no symptoms but tested positive:  More than 5 days since the date of the first positive test (20 days if moderately to severely immunocompromised). If you develop symptoms, then use the guidelines above.  Continue to wear a mask around others for 5 additional days.      Contact Tracing    As one of the next steps, you will receive a call or text from the [place] Department of Health ([facility]) COVID-19 Tracing Team. See the contact information below so you know not to ignore the health departments call. It is important that you contact them back immediately so they can help.      Contact Tracer Number:  [phone]  Caller ID for most carriers: [facility]     What is contact tracing?  Contact tracing is a process that helps identify everyone who has been in close contact with an infected person. Contact tracers let those people know they may have been exposed and guide them on next steps. Confidentiality is important for everyone; no one will be told who may have exposed them to the virus.  Your involvement is important. The more we know about where and how this virus is spreading, the better chance we have at stopping it from spreading further.  What does exposure mean?  Exposure means you have been within 6 feet for more than 15 minutes with a person who  has or had COVID-19.  What kind of questions do the contact tracers ask?  A contact tracer will confirm your basic contact information including name, address, phone number, and next of kin, as well as asking about any symptoms you may have had. Theyll also ask you how you think you may have gotten sick, such as places where you may have been exposed to the virus, and people you were with. Those names will never be shared with anyone outside of that call, and will only be used to help trace and stop the spread of the virus.   I have privacy concerns. How will the state use my information?  Your privacy about your health is important. All calls are completed using call centers that use the appropriate health privacy protection measures (HIPAA compliance), meaning that your patient information is safe. No one will ever ask you any questions related to immigration status. Your health comes first.   Do I have to participate?  You do not have to participate, but we strongly encourage you to. Contact tracing can help us catch and control new outbreaks as theyre developing to keep your friends and family safe.   What if I dont hear from anyone?  If you dont receive a call within 24 hours, you can call the number above right away to inquire about your status. That line is open from 8:00 am - 8:00 p.m., 7 days a week.  Contact tracing saves lives! Together, we have the power to beat this virus and keep our loved ones and neighbors safe.    For more information see CDC link below.      https://www.cdc.gov/coronavirus/2019-ncov/hcp/guidance-prevent-spread.html#precautions        Sources:  [facility], [place] Department [facility] and [facility]           Sincerely,     Surendra Otto MD

## 2022-08-16 NOTE — PROGRESS NOTES
"Subjective:       Patient ID: Donal Serrano is a 32 y.o. male.    Vitals:  height is 5' 7" (1.702 m) and weight is 84.4 kg (186 lb). His temperature is 98.2 °F (36.8 °C). His blood pressure is 145/98 (abnormal) and his pulse is 74. His respiration is 18 and oxygen saturation is 96%.     Chief Complaint: URI    Pt states yesterday she woke up with a headache, chills,. Fatigue, body aches and  Dry cough , sore throat,  Post nasal drip  ,  Denies fever,   He took zyrtec with mild relief ,  He is vaccanited    URI   This is a new problem. The current episode started yesterday. The problem has been gradually worsening. There has been no fever. Associated symptoms include congestion, coughing, headaches, sinus pain and sneezing. He has tried antihistamine for the symptoms.       HENT: Positive for congestion and sinus pain.    Respiratory: Positive for cough.    Allergic/Immunologic: Positive for sneezing.   Neurological: Positive for headaches.       Objective:      Physical Exam   Constitutional: He is oriented to person, place, and time. He appears well-developed. He is cooperative.  Non-toxic appearance. He does not appear ill. No distress.   HENT:   Head: Normocephalic and atraumatic.   Ears:   Right Ear: Hearing, tympanic membrane, external ear and ear canal normal.   Left Ear: Hearing, tympanic membrane, external ear and ear canal normal.   Nose: Nose normal. No mucosal edema, rhinorrhea or nasal deformity. No epistaxis. Right sinus exhibits no maxillary sinus tenderness and no frontal sinus tenderness. Left sinus exhibits no maxillary sinus tenderness and no frontal sinus tenderness.   Mouth/Throat: Uvula is midline, oropharynx is clear and moist and mucous membranes are normal. Mucous membranes are moist. No trismus in the jaw. Normal dentition. No uvula swelling. No oropharyngeal exudate. Oropharynx is clear.   Eyes: Conjunctivae and lids are normal. Pupils are equal, round, and reactive to light. Right " eye exhibits no discharge. Left eye exhibits no discharge. No scleral icterus. Extraocular movement intact   Neck: Trachea normal and phonation normal. Neck supple.   Cardiovascular: Normal rate, regular rhythm, normal heart sounds and normal pulses.   Pulmonary/Chest: Effort normal and breath sounds normal. No respiratory distress.   Abdominal: Normal appearance and bowel sounds are normal. He exhibits no distension, no pulsatile midline mass and no mass. Soft. There is no abdominal tenderness.   Musculoskeletal: Normal range of motion.         General: No deformity. Normal range of motion.   Neurological: He is alert and oriented to person, place, and time. He exhibits normal muscle tone. Coordination normal.   Skin: Skin is warm, dry, intact, not diaphoretic and not pale.   Psychiatric: His speech is normal and behavior is normal. Judgment and thought content normal.   Nursing note and vitals reviewed.        Assessment:       1. Cough due to ACE inhibitor    2. Lab test positive for detection of COVID-19 virus          Plan:         Cough due to ACE inhibitor  -     POCT COVID-19 Rapid Screening    Lab test positive for detection of COVID-19 virus    Other orders  -     loratadine (CLARITIN) 10 mg tablet; Take 1 tablet (10 mg total) by mouth once daily.  Dispense: 30 tablet; Refill: 2  -     benzonatate (TESSALON PERLES) 100 MG capsule; Take 2 capsules (200 mg total) by mouth 3 (three) times daily as needed for Cough.  Dispense: 30 capsule; Refill: 1               Results for orders placed or performed in visit on 08/16/22   POCT COVID-19 Rapid Screening   Result Value Ref Range    POC Rapid COVID Positive (A) Negative     Acceptable Yes

## 2022-08-16 NOTE — LETTER
August 16, 2022      [facility]  [address] [address]  Phone: [phone]  Fax: [phone]       Patient: Donal Serrano   YOB: 1990  Date of Visit: 08/16/2022    To Whom It May Concern:    Miesha Serrano  was at [facility] on 08/16/2022. The patient may return to work/school on 8/20/22   with no restrictions. If you have any questions or concerns, or if I can be of further assistance, please do not hesitate to contact me.    Sincerely,    Surendra Otto MD

## 2022-08-17 ENCOUNTER — PATIENT MESSAGE (OUTPATIENT)
Dept: INTERNAL MEDICINE | Facility: CLINIC | Age: 32
End: 2022-08-17
Payer: COMMERCIAL

## 2022-08-17 NOTE — TELEPHONE ENCOUNTER
Mucinex to keep the mucus thinned and vigorous hydration, preferable w/ water    Will escript paxlovid

## 2022-09-09 ENCOUNTER — OFFICE VISIT (OUTPATIENT)
Dept: INTERNAL MEDICINE | Facility: CLINIC | Age: 32
End: 2022-09-09
Payer: COMMERCIAL

## 2022-09-09 VITALS
DIASTOLIC BLOOD PRESSURE: 80 MMHG | BODY MASS INDEX: 29.93 KG/M2 | HEIGHT: 67 IN | OXYGEN SATURATION: 98 % | SYSTOLIC BLOOD PRESSURE: 128 MMHG | HEART RATE: 100 BPM | WEIGHT: 190.69 LBS

## 2022-09-09 DIAGNOSIS — J32.9 SINUSITIS, UNSPECIFIED CHRONICITY, UNSPECIFIED LOCATION: Primary | ICD-10-CM

## 2022-09-09 PROCEDURE — 3079F DIAST BP 80-89 MM HG: CPT | Mod: CPTII,S$GLB,, | Performed by: INTERNAL MEDICINE

## 2022-09-09 PROCEDURE — 3074F PR MOST RECENT SYSTOLIC BLOOD PRESSURE < 130 MM HG: ICD-10-PCS | Mod: CPTII,S$GLB,, | Performed by: INTERNAL MEDICINE

## 2022-09-09 PROCEDURE — 3008F PR BODY MASS INDEX (BMI) DOCUMENTED: ICD-10-PCS | Mod: CPTII,S$GLB,, | Performed by: INTERNAL MEDICINE

## 2022-09-09 PROCEDURE — 99213 OFFICE O/P EST LOW 20 MIN: CPT | Mod: S$GLB,,, | Performed by: INTERNAL MEDICINE

## 2022-09-09 PROCEDURE — 99999 PR PBB SHADOW E&M-EST. PATIENT-LVL III: ICD-10-PCS | Mod: PBBFAC,,, | Performed by: INTERNAL MEDICINE

## 2022-09-09 PROCEDURE — 3079F PR MOST RECENT DIASTOLIC BLOOD PRESSURE 80-89 MM HG: ICD-10-PCS | Mod: CPTII,S$GLB,, | Performed by: INTERNAL MEDICINE

## 2022-09-09 PROCEDURE — 4010F ACE/ARB THERAPY RXD/TAKEN: CPT | Mod: CPTII,S$GLB,, | Performed by: INTERNAL MEDICINE

## 2022-09-09 PROCEDURE — 3074F SYST BP LT 130 MM HG: CPT | Mod: CPTII,S$GLB,, | Performed by: INTERNAL MEDICINE

## 2022-09-09 PROCEDURE — 1159F PR MEDICATION LIST DOCUMENTED IN MEDICAL RECORD: ICD-10-PCS | Mod: CPTII,S$GLB,, | Performed by: INTERNAL MEDICINE

## 2022-09-09 PROCEDURE — 3008F BODY MASS INDEX DOCD: CPT | Mod: CPTII,S$GLB,, | Performed by: INTERNAL MEDICINE

## 2022-09-09 PROCEDURE — 99213 PR OFFICE/OUTPT VISIT, EST, LEVL III, 20-29 MIN: ICD-10-PCS | Mod: S$GLB,,, | Performed by: INTERNAL MEDICINE

## 2022-09-09 PROCEDURE — 1159F MED LIST DOCD IN RCRD: CPT | Mod: CPTII,S$GLB,, | Performed by: INTERNAL MEDICINE

## 2022-09-09 PROCEDURE — 99999 PR PBB SHADOW E&M-EST. PATIENT-LVL III: CPT | Mod: PBBFAC,,, | Performed by: INTERNAL MEDICINE

## 2022-09-09 PROCEDURE — 4010F PR ACE/ARB THEARPY RXD/TAKEN: ICD-10-PCS | Mod: CPTII,S$GLB,, | Performed by: INTERNAL MEDICINE

## 2022-09-09 RX ORDER — AMOXICILLIN 875 MG/1
875 TABLET, FILM COATED ORAL 2 TIMES DAILY
Qty: 20 TABLET | Refills: 0 | Status: SHIPPED | OUTPATIENT
Start: 2022-09-09 | End: 2023-02-15 | Stop reason: ALTCHOICE

## 2022-09-09 NOTE — PROGRESS NOTES
32-year-old  Male     For the past 5 days he has been developing congestion in his head and chest, initially was having wheezing but this is not the case reports no shortness of breath.  Is having a sore throat nasal congestion in blowing out and coughing up green mucus.    His wife has a similar situation.  She went urgent care.  Tested negative for strep/COVID/flu.  Was told of a bacterial infection.  On August 16th he tested positive for COVID when he had fever and body aches.    Medical history  Hypertension Medication irbesartan 300 mg      Examination   Weight 190   Pulse 100   Blood pressure 128/80 by me  Pulse ox 99%  Tympanic membranes normal   Nasal mucosa swollen congested with yellow mucus  Oropharynx hyperemic and slightly edematous.  No exudates   Mi enlarged submandibular lymph glands   Chest clear breath sounds good effort   Heart regular rate rhythm    Impression   Sinusitis    Plan   Amoxicillin 875 mg b.i.d. for 10 days   Use over-the-counter Mucinex      Addendum med card states allergy to amoxicillin and azithromycin but he is taking these medications in the past is listed as allergies because the father having history of rarely hepatic cancer

## 2022-11-10 ENCOUNTER — OFFICE VISIT (OUTPATIENT)
Dept: INTERNAL MEDICINE | Facility: CLINIC | Age: 32
End: 2022-11-10
Payer: COMMERCIAL

## 2022-11-10 VITALS
HEART RATE: 90 BPM | HEIGHT: 67 IN | SYSTOLIC BLOOD PRESSURE: 130 MMHG | BODY MASS INDEX: 29.69 KG/M2 | TEMPERATURE: 98 F | DIASTOLIC BLOOD PRESSURE: 94 MMHG | OXYGEN SATURATION: 97 % | WEIGHT: 189.13 LBS

## 2022-11-10 DIAGNOSIS — H66.002 ACUTE SUPPURATIVE OTITIS MEDIA OF LEFT EAR WITHOUT SPONTANEOUS RUPTURE OF TYMPANIC MEMBRANE, RECURRENCE NOT SPECIFIED: Primary | ICD-10-CM

## 2022-11-10 DIAGNOSIS — J02.9 PHARYNGITIS, UNSPECIFIED ETIOLOGY: ICD-10-CM

## 2022-11-10 PROCEDURE — 4010F ACE/ARB THERAPY RXD/TAKEN: CPT | Mod: CPTII,S$GLB,,

## 2022-11-10 PROCEDURE — 99999 PR PBB SHADOW E&M-EST. PATIENT-LVL IV: CPT | Mod: PBBFAC,,,

## 2022-11-10 PROCEDURE — 99214 PR OFFICE/OUTPT VISIT, EST, LEVL IV, 30-39 MIN: ICD-10-PCS | Mod: S$GLB,,,

## 2022-11-10 PROCEDURE — 3008F PR BODY MASS INDEX (BMI) DOCUMENTED: ICD-10-PCS | Mod: CPTII,S$GLB,,

## 2022-11-10 PROCEDURE — 3075F PR MOST RECENT SYSTOLIC BLOOD PRESS GE 130-139MM HG: ICD-10-PCS | Mod: CPTII,S$GLB,,

## 2022-11-10 PROCEDURE — 3080F PR MOST RECENT DIASTOLIC BLOOD PRESSURE >= 90 MM HG: ICD-10-PCS | Mod: CPTII,S$GLB,,

## 2022-11-10 PROCEDURE — 3008F BODY MASS INDEX DOCD: CPT | Mod: CPTII,S$GLB,,

## 2022-11-10 PROCEDURE — 1159F MED LIST DOCD IN RCRD: CPT | Mod: CPTII,S$GLB,,

## 2022-11-10 PROCEDURE — 99214 OFFICE O/P EST MOD 30 MIN: CPT | Mod: S$GLB,,,

## 2022-11-10 PROCEDURE — 1159F PR MEDICATION LIST DOCUMENTED IN MEDICAL RECORD: ICD-10-PCS | Mod: CPTII,S$GLB,,

## 2022-11-10 PROCEDURE — 4010F PR ACE/ARB THEARPY RXD/TAKEN: ICD-10-PCS | Mod: CPTII,S$GLB,,

## 2022-11-10 PROCEDURE — 3080F DIAST BP >= 90 MM HG: CPT | Mod: CPTII,S$GLB,,

## 2022-11-10 PROCEDURE — 3075F SYST BP GE 130 - 139MM HG: CPT | Mod: CPTII,S$GLB,,

## 2022-11-10 PROCEDURE — 99999 PR PBB SHADOW E&M-EST. PATIENT-LVL IV: ICD-10-PCS | Mod: PBBFAC,,,

## 2022-11-10 RX ORDER — DOXYCYCLINE HYCLATE 100 MG
100 TABLET ORAL 2 TIMES DAILY
Qty: 14 TABLET | Refills: 0 | Status: SHIPPED | OUTPATIENT
Start: 2022-11-10 | End: 2022-11-17

## 2022-11-10 NOTE — PROGRESS NOTES
[facility] Note    Chief Complaint      Chief Complaint   Patient presents with    Sore Throat     X 3 DAYS    Otalgia     Left ear X 2 DAYS     History of Present Illness      Donal Serrano is a 32 y.o. male patient of Dr. Aguila who presents today for sore throat and ear pain x 3 days. The sore throat has gradually become more raw, dry and itchy, despite pt drinking plenty water and poweraid. His left ear has a shooting pain that has slowly gotten worse over the past few days. Nothing made the pain worse.  Pt took tylenol so that he could sleep, but this didn't help.  The discomfort occurred during both day and night.  Current pain level is 6/10.      Health Maintenance   Topic Date Due    TETANUS VACCINE  03/21/2026    Hepatitis C Screening  Completed    Lipid Panel  Completed       Past Medical History:   Diagnosis Date    Keloid cicatrix     Kidney stones        Past Surgical History:   Procedure Laterality Date    APPENDECTOMY      CHOLECYSTECTOMY  03/2012    LAPAROSCOPIC APPENDECTOMY N/A 8/20/2018    Procedure: APPENDECTOMY, LAPAROSCOPIC;  Surgeon: Saulo Powell MD;  Location: [facility] OR [address] [place] [address];  Service: General;  Laterality: N/A;       family history includes Cancer in his father; Cirrhosis in his father; Cleft lip in his daughter; Heart disease in his maternal uncle; Hypertension in his maternal grandmother and mother; Liver disease in his paternal grandfather; Melanoma in his maternal grandfather; No Known Problems in his paternal grandmother, sister, and sister; Skin cancer in his maternal grandfather and paternal grandfather.     Social History     Tobacco Use    Smoking status: Never    Smokeless tobacco: Never   Substance Use Topics    Alcohol use: No    Drug use: No       Review of Systems   Constitutional:  Positive for malaise/fatigue. Negative for chills and fever.   HENT:  Positive for congestion, ear discharge, ear pain, sinus pain and sore throat.    Eyes:  "Negative.    Respiratory:  Positive for cough. Negative for sputum production and shortness of breath.    Cardiovascular:  Negative for chest pain and palpitations.   Gastrointestinal:  Negative for abdominal pain, constipation, diarrhea, nausea and vomiting.   Musculoskeletal:  Negative for myalgias.      Outpatient Encounter Medications as of 11/10/2022   Medication Sig Dispense Refill    blood pressure monitor ([employer] EASE) ST size by Other route as needed for High Blood Pressure. 1 each 0    irbesartan (AVAPRO) 150 MG tablet Take 1 tablet (150 mg total) by mouth every evening. 90 tablet 3    vitamin D (VITAMIN D3) 1000 units Tab Take 1,000 Units by mouth once daily.       amoxicillin (AMOXIL) 875 MG tablet Take 1 tablet (875 mg total) by mouth 2 (two) times daily. 20 tablet 0    benzonatate (TESSALON PERLES) 100 MG capsule Take 2 capsules (200 mg total) by mouth 3 (three) times daily as needed for Cough. (Patient not taking: Reported on 11/10/2022) 30 capsule 1    doxycycline (VIBRA-TABS) 100 MG tablet Take 1 tablet (100 mg total) by mouth 2 (two) times daily. for 7 days 14 tablet 0    loratadine (CLARITIN) 10 mg tablet Take 1 tablet (10 mg total) by mouth once daily. (Patient not taking: Reported on 11/10/2022) 30 tablet 2     No facility-administered encounter medications on file as of 11/10/2022.       Review of patient's allergies indicates:   Allergen Reactions    Amoxicillin      Advised by Hepatology to avoid 2/2 to concerns for autoimmune rxn    Zithromax [azithromycin]      Advised by Hepatology to avoid 2/2 to concerns for autoimmune rxn       Physical Exam      Vital Signs  Temp: 98.2 °F (36.8 °C)  Pulse: 90  SpO2: 97 %  BP: (!) 130/94  Pain Score:   6  Pain Loc: Throat  Height and Weight  Height: 5' 7" (170.2 cm)  Weight: 85.8 kg (189 lb 2.5 oz)  BSA (Calculated - sq m): 2.01 sq meters  BMI (Calculated): 29.6  Weight in (lb) to have BMI = 25: 159.3    Physical Exam  Vitals and nursing note reviewed. "   Constitutional:       Appearance: He is ill-appearing.   HENT:      Head: Normocephalic and atraumatic.      Right Ear: Hearing and tympanic membrane normal. There is impacted cerumen.      Left Ear: Hearing normal. Tenderness present. A middle ear effusion is present. There is impacted cerumen. Tympanic membrane is injected, erythematous and bulging.      Nose: Nose normal.      Mouth/Throat:      Mouth: Mucous membranes are dry.      Pharynx: Uvula midline. Oropharyngeal exudate, posterior oropharyngeal erythema and uvula swelling present.   Cardiovascular:      Rate and Rhythm: Normal rate and regular rhythm.      Pulses: Normal pulses.      Heart sounds: Normal heart sounds.   Pulmonary:      Effort: Pulmonary effort is normal.      Breath sounds: Normal breath sounds and air entry.   Lymphadenopathy:      Head:      Right side of head: Submental, submandibular, tonsillar, preauricular and posterior auricular adenopathy present.      Left side of head: Submental, submandibular, tonsillar, preauricular and posterior auricular adenopathy present.      Comments: Pain and Tenderness noted   Skin:     General: Skin is warm and dry.   Neurological:      Mental Status: He is alert.        Laboratory:  CBC:  Lab Results   Component Value Date    WBC 7.20 04/05/2022    RBC 5.36 04/05/2022    HGB 15.5 04/05/2022    HCT 47.3 04/05/2022     04/05/2022    MCV 88 04/05/2022    MCH 28.9 04/05/2022    MCHC 32.8 04/05/2022    MCHC 33.8 08/06/2020    MCHC 33.2 10/23/2019     CMP:  Lab Results   Component Value Date    GLU 84 04/05/2022    CALCIUM 9.8 04/05/2022    ALBUMIN 4.2 04/05/2022    PROT 7.8 04/05/2022     04/05/2022    K 3.9 04/05/2022    CO2 20 (L) 04/05/2022     04/05/2022    BUN 10 04/05/2022    ALKPHOS 79 04/05/2022    ALT 32 04/05/2022    AST 21 04/05/2022    BILITOT 0.7 04/05/2022    BILITOT 0.5 08/06/2020    BILITOT 0.5 10/23/2019     URINALYSIS:  Lab Results   Component Value Date    COLORU  Yellow 04/05/2022    SPECGRAV 1.010 04/05/2022    PHUR 6.0 04/05/2022    PROTEINUA Negative 04/05/2022    BACTERIA Rare 07/18/2011    NITRITE Negative 04/05/2022    LEUKOCYTESUR Negative 04/05/2022    UROBILINOGEN Negative 08/20/2018      LIPIDS:  Lab Results   Component Value Date    TSH 1.400 04/05/2022    TSH 0.841 08/06/2020    TSH 0.661 01/21/2019    HDL 43 04/05/2022    HDL 48 02/26/2018    HDL 49 06/07/2011    CHOL 160 04/05/2022    CHOL 162 02/26/2018    CHOL 152 06/07/2011    TRIG 64 04/05/2022    TRIG 81 02/26/2018    TRIG 37 06/07/2011    LDLCALC 104.2 04/05/2022    LDLCALC 97.8 02/26/2018    LDLCALC 95.6 06/07/2011    CHOLHDL 26.9 04/05/2022    CHOLHDL 29.6 02/26/2018    CHOLHDL 32.2 06/07/2011    NONHDLCHOL 117 04/05/2022    NONHDLCHOL 114 02/26/2018    TOTALCHOLEST 3.7 04/05/2022    TOTALCHOLEST 3.4 02/26/2018    TOTALCHOLEST 3.1 06/07/2011     TSH:  Lab Results   Component Value Date    TSH 1.400 04/05/2022    TSH 0.841 08/06/2020    TSH 0.661 01/21/2019     A1C:  Lab Results   Component Value Date    HGBA1C 4.9 06/07/2011    HGBA1C 4.9 04/27/2009    HGBA1C 4.8 02/14/2005         Assessment/Plan     Donal Serrano is a 32 y.o.male with:    Acute suppurative otitis media of left ear without spontaneous rupture of tympanic membrane, recurrence not specified    Pharyngitis, unspecified etiology    Other orders  -     doxycycline (VIBRA-TABS) 100 MG tablet; Take 1 tablet (100 mg total) by mouth 2 (two) times daily. for 7 days  Dispense: 14 tablet; Refill: 0   -Take zyrtec, plain mucinex and flonase for symptomatic treatment.  Drink plenty fluids to rehydrate and include pedialyte.    I spent 36 minutes on the day of this encounter for preparing for, evaluating, treating, and managing this patient.        -Continue current medications and maintain follow up with specialists.  Return to clinic in Follow up in about 3 days (around 11/13/2022), or if symptoms worsen or fail to improve.        Gelnys TUCKER  [facility] [place]

## 2022-11-16 ENCOUNTER — PATIENT MESSAGE (OUTPATIENT)
Dept: ADMINISTRATIVE | Facility: HOSPITAL | Age: 32
End: 2022-11-16
Payer: COMMERCIAL

## 2022-12-30 ENCOUNTER — PATIENT MESSAGE (OUTPATIENT)
Dept: INTERNAL MEDICINE | Facility: CLINIC | Age: 32
End: 2022-12-30
Payer: COMMERCIAL

## 2023-02-03 ENCOUNTER — OFFICE VISIT (OUTPATIENT)
Dept: CARDIOLOGY | Facility: CLINIC | Age: 33
End: 2023-02-03
Payer: COMMERCIAL

## 2023-02-03 VITALS
RESPIRATION RATE: 20 BRPM | HEIGHT: 67 IN | DIASTOLIC BLOOD PRESSURE: 87 MMHG | SYSTOLIC BLOOD PRESSURE: 135 MMHG | WEIGHT: 190.94 LBS | HEART RATE: 87 BPM | BODY MASS INDEX: 29.97 KG/M2

## 2023-02-03 DIAGNOSIS — I10 ESSENTIAL HYPERTENSION: ICD-10-CM

## 2023-02-03 DIAGNOSIS — R00.0 RAPID HEARTBEAT: Primary | ICD-10-CM

## 2023-02-03 DIAGNOSIS — R00.2 HEART PALPITATIONS: ICD-10-CM

## 2023-02-03 PROCEDURE — 3075F SYST BP GE 130 - 139MM HG: CPT | Mod: CPTII,S$GLB,, | Performed by: INTERNAL MEDICINE

## 2023-02-03 PROCEDURE — 93000 ELECTROCARDIOGRAM COMPLETE: CPT | Mod: S$GLB,,, | Performed by: INTERNAL MEDICINE

## 2023-02-03 PROCEDURE — 1160F RVW MEDS BY RX/DR IN RCRD: CPT | Mod: CPTII,S$GLB,, | Performed by: INTERNAL MEDICINE

## 2023-02-03 PROCEDURE — 1159F MED LIST DOCD IN RCRD: CPT | Mod: CPTII,S$GLB,, | Performed by: INTERNAL MEDICINE

## 2023-02-03 PROCEDURE — 3079F PR MOST RECENT DIASTOLIC BLOOD PRESSURE 80-89 MM HG: ICD-10-PCS | Mod: CPTII,S$GLB,, | Performed by: INTERNAL MEDICINE

## 2023-02-03 PROCEDURE — 3008F PR BODY MASS INDEX (BMI) DOCUMENTED: ICD-10-PCS | Mod: CPTII,S$GLB,, | Performed by: INTERNAL MEDICINE

## 2023-02-03 PROCEDURE — 3075F PR MOST RECENT SYSTOLIC BLOOD PRESS GE 130-139MM HG: ICD-10-PCS | Mod: CPTII,S$GLB,, | Performed by: INTERNAL MEDICINE

## 2023-02-03 PROCEDURE — 3008F BODY MASS INDEX DOCD: CPT | Mod: CPTII,S$GLB,, | Performed by: INTERNAL MEDICINE

## 2023-02-03 PROCEDURE — 99999 PR PBB SHADOW E&M-EST. PATIENT-LVL III: ICD-10-PCS | Mod: PBBFAC,,, | Performed by: INTERNAL MEDICINE

## 2023-02-03 PROCEDURE — 99204 PR OFFICE/OUTPT VISIT, NEW, LEVL IV, 45-59 MIN: ICD-10-PCS | Mod: 25,S$GLB,, | Performed by: INTERNAL MEDICINE

## 2023-02-03 PROCEDURE — 1159F PR MEDICATION LIST DOCUMENTED IN MEDICAL RECORD: ICD-10-PCS | Mod: CPTII,S$GLB,, | Performed by: INTERNAL MEDICINE

## 2023-02-03 PROCEDURE — 99999 PR PBB SHADOW E&M-EST. PATIENT-LVL III: CPT | Mod: PBBFAC,,, | Performed by: INTERNAL MEDICINE

## 2023-02-03 PROCEDURE — 99204 OFFICE O/P NEW MOD 45 MIN: CPT | Mod: 25,S$GLB,, | Performed by: INTERNAL MEDICINE

## 2023-02-03 PROCEDURE — 1160F PR REVIEW ALL MEDS BY PRESCRIBER/CLIN PHARMACIST DOCUMENTED: ICD-10-PCS | Mod: CPTII,S$GLB,, | Performed by: INTERNAL MEDICINE

## 2023-02-03 PROCEDURE — 93000 EKG 12-LEAD: ICD-10-PCS | Mod: S$GLB,,, | Performed by: INTERNAL MEDICINE

## 2023-02-03 PROCEDURE — 3079F DIAST BP 80-89 MM HG: CPT | Mod: CPTII,S$GLB,, | Performed by: INTERNAL MEDICINE

## 2023-02-03 NOTE — PROGRESS NOTES
HISTORY:    33 yo M w a h/o hypertension presenting for initial evaluation by me.    Pt comes in to discuss intermittent elevated heart rates. He notes 2 episodes of his heart rates running in the low 100s while at rest and without symptoms. Transient lasting minutes. No h/o palpitations, light headedness, or dizziness.    Activity levels normal. Does 6-8k steps/day. Average heart rates on apple watch in the low 70s throughout the day and 100 while going on walks.     Pt overall feels well. Works as a . Has a 2 year old daughter.    Diagnosed with hypertension 5 years ago. Started on irbesartan 150x1 5 years ago. Does not check Bps at home.     PHYSICAL EXAM:    Vitals:    02/03/23 0859   Resp: 20       NAD, A+Ox3.  No jvd, no bruit.  RRR nml s1,s2. No murmurs.  CTA B no wheezes or crackles.  2+ B radial and 1+ B DP/PT. No edema.    LABS/STUDIES (imaging reviewed during clinic visit):    2022 CBC and CMP normal.   and HDL 43.  Triglycerides 64.  TSH normal.    ECG January 2023 sinus rhythm no Q-waves or ST changes.    TTE 2020 normal LV size and function.  Normal diastology.  CVP 3.     ASSESSMENT & PLAN:    1. Rapid heartbeat    2. Essential hypertension    3. Heart palpitations        Orders Placed This Encounter    IN OFFICE EKG 12-LEAD (to [facility])      Recent episode of transient heart rate elevations not concerning. No symptoms. Monitor for now.    Bps appear above goal. Cont irbesartan 150x1. Pt will start a home BP log. Pt will make some diet modifications and build upon his exercise regimen of walking. Goal weight 160.    Deidre Cm MD

## 2023-02-04 ENCOUNTER — PATIENT MESSAGE (OUTPATIENT)
Dept: INTERNAL MEDICINE | Facility: CLINIC | Age: 33
End: 2023-02-04
Payer: COMMERCIAL

## 2023-02-11 NOTE — PROGRESS NOTES
CC: HTN and anxiety    33 y.o. male w/ anxiety  And panic attack that occurred one morning around 4 am and woke w/ brain racing  + heart palpitations and paresthesias oral   and fingertips  Had several days of diminished sleep  Tx: Meditation   Walking 45' at night and practice breathing  Also, had amitriptyline for OAB that helped him sleep  Minimal caffeine  New job, - as of 1/1/2023,  loves job but more demands      HTN   Tx :irbesartan 150mg  Denied HA or dizziness  BP today==>136/76    MEDCARD: Reviewed  ROS:  No fever, chills ,or night sweats  No cough or wheezing  No focal deficits or paresthesia  Remainder of review negative except as previously noted    PMHX: Reviewed  SHX: Reviewed-   FHX: Reviewed    PE:  VSS:   GEN: WDWN, A&O, NAD, conversant and co-operative  EYES: Conj/lids unremarkable, sclera anicteric  RESP: Efforts unlabored  Lungs CTA  CV: Heart RRR  GI: BS + , soft, tender inferior umbilicus area w//o erythema or warmth  MSK: Gait normal. No CCE  NEURO: MAZAIREGOS. No tremor or facial asymmetry  DTR's 1+ and equal UE  SKIN: Warm and dry    IMPRESSION:  Anxiety, exacerbation in setting of stressful new job  Panic attack, 1st episode  Insomnia, improved w/ amitriptyline  HTN, stable      PLAN:  Rx amitriptyline  Reviewed SSRI  TRIAL Buspar 7.5mg - sedation possible  Continue present meds  RTC 4 wks  Call prn

## 2023-02-15 ENCOUNTER — OFFICE VISIT (OUTPATIENT)
Dept: INTERNAL MEDICINE | Facility: CLINIC | Age: 33
End: 2023-02-15
Payer: COMMERCIAL

## 2023-02-15 VITALS
BODY MASS INDEX: 29.41 KG/M2 | TEMPERATURE: 98 F | DIASTOLIC BLOOD PRESSURE: 76 MMHG | HEART RATE: 88 BPM | RESPIRATION RATE: 16 BRPM | OXYGEN SATURATION: 99 % | WEIGHT: 187.38 LBS | HEIGHT: 67 IN | SYSTOLIC BLOOD PRESSURE: 136 MMHG

## 2023-02-15 DIAGNOSIS — F41.0 PANIC ATTACK AS REACTION TO STRESS: ICD-10-CM

## 2023-02-15 DIAGNOSIS — I10 ESSENTIAL HYPERTENSION: ICD-10-CM

## 2023-02-15 DIAGNOSIS — F43.0 PANIC ATTACK AS REACTION TO STRESS: ICD-10-CM

## 2023-02-15 DIAGNOSIS — F41.9 ANXIETY: Primary | ICD-10-CM

## 2023-02-15 DIAGNOSIS — G47.00 INSOMNIA, UNSPECIFIED TYPE: ICD-10-CM

## 2023-02-15 PROCEDURE — 99999 PR PBB SHADOW E&M-EST. PATIENT-LVL III: ICD-10-PCS | Mod: PBBFAC,,, | Performed by: INTERNAL MEDICINE

## 2023-02-15 PROCEDURE — 99214 OFFICE O/P EST MOD 30 MIN: CPT | Mod: S$GLB,,, | Performed by: INTERNAL MEDICINE

## 2023-02-15 PROCEDURE — 99214 PR OFFICE/OUTPT VISIT, EST, LEVL IV, 30-39 MIN: ICD-10-PCS | Mod: S$GLB,,, | Performed by: INTERNAL MEDICINE

## 2023-02-15 PROCEDURE — 3075F SYST BP GE 130 - 139MM HG: CPT | Mod: CPTII,S$GLB,, | Performed by: INTERNAL MEDICINE

## 2023-02-15 PROCEDURE — 99999 PR PBB SHADOW E&M-EST. PATIENT-LVL III: CPT | Mod: PBBFAC,,, | Performed by: INTERNAL MEDICINE

## 2023-02-15 PROCEDURE — 1159F MED LIST DOCD IN RCRD: CPT | Mod: CPTII,S$GLB,, | Performed by: INTERNAL MEDICINE

## 2023-02-15 PROCEDURE — 3008F PR BODY MASS INDEX (BMI) DOCUMENTED: ICD-10-PCS | Mod: CPTII,S$GLB,, | Performed by: INTERNAL MEDICINE

## 2023-02-15 PROCEDURE — 3008F BODY MASS INDEX DOCD: CPT | Mod: CPTII,S$GLB,, | Performed by: INTERNAL MEDICINE

## 2023-02-15 PROCEDURE — 1159F PR MEDICATION LIST DOCUMENTED IN MEDICAL RECORD: ICD-10-PCS | Mod: CPTII,S$GLB,, | Performed by: INTERNAL MEDICINE

## 2023-02-15 PROCEDURE — 3078F PR MOST RECENT DIASTOLIC BLOOD PRESSURE < 80 MM HG: ICD-10-PCS | Mod: CPTII,S$GLB,, | Performed by: INTERNAL MEDICINE

## 2023-02-15 PROCEDURE — 3075F PR MOST RECENT SYSTOLIC BLOOD PRESS GE 130-139MM HG: ICD-10-PCS | Mod: CPTII,S$GLB,, | Performed by: INTERNAL MEDICINE

## 2023-02-15 PROCEDURE — 3078F DIAST BP <80 MM HG: CPT | Mod: CPTII,S$GLB,, | Performed by: INTERNAL MEDICINE

## 2023-02-15 RX ORDER — AMITRIPTYLINE HYDROCHLORIDE 25 MG/1
25 TABLET, FILM COATED ORAL NIGHTLY PRN
Qty: 30 TABLET | Refills: 2 | Status: SHIPPED | OUTPATIENT
Start: 2023-02-15 | End: 2024-02-15

## 2023-02-15 RX ORDER — BUSPIRONE HYDROCHLORIDE 7.5 MG/1
7.5 TABLET ORAL
Qty: 30 TABLET | Refills: 2 | Status: SHIPPED | OUTPATIENT
Start: 2023-02-15 | End: 2023-08-08 | Stop reason: SDUPTHER

## 2023-03-21 ENCOUNTER — OFFICE VISIT (OUTPATIENT)
Dept: INTERNAL MEDICINE | Facility: CLINIC | Age: 33
End: 2023-03-21
Payer: COMMERCIAL

## 2023-03-21 VITALS
DIASTOLIC BLOOD PRESSURE: 88 MMHG | WEIGHT: 191.81 LBS | HEIGHT: 67 IN | BODY MASS INDEX: 30.1 KG/M2 | RESPIRATION RATE: 20 BRPM | HEART RATE: 82 BPM | TEMPERATURE: 97 F | OXYGEN SATURATION: 98 % | SYSTOLIC BLOOD PRESSURE: 130 MMHG

## 2023-03-21 DIAGNOSIS — F41.9 ANXIETY: ICD-10-CM

## 2023-03-21 DIAGNOSIS — Z00.00 ANNUAL PHYSICAL EXAM: Primary | ICD-10-CM

## 2023-03-21 DIAGNOSIS — I10 ESSENTIAL HYPERTENSION: ICD-10-CM

## 2023-03-21 PROCEDURE — 4010F ACE/ARB THERAPY RXD/TAKEN: CPT | Mod: CPTII,S$GLB,,

## 2023-03-21 PROCEDURE — 3075F PR MOST RECENT SYSTOLIC BLOOD PRESS GE 130-139MM HG: ICD-10-PCS | Mod: CPTII,S$GLB,,

## 2023-03-21 PROCEDURE — 3008F PR BODY MASS INDEX (BMI) DOCUMENTED: ICD-10-PCS | Mod: CPTII,S$GLB,,

## 2023-03-21 PROCEDURE — 3075F SYST BP GE 130 - 139MM HG: CPT | Mod: CPTII,S$GLB,,

## 2023-03-21 PROCEDURE — 99395 PR PREVENTIVE VISIT,EST,18-39: ICD-10-PCS | Mod: S$GLB,,,

## 2023-03-21 PROCEDURE — 1159F PR MEDICATION LIST DOCUMENTED IN MEDICAL RECORD: ICD-10-PCS | Mod: CPTII,S$GLB,,

## 2023-03-21 PROCEDURE — 99999 PR PBB SHADOW E&M-EST. PATIENT-LVL IV: CPT | Mod: PBBFAC,,,

## 2023-03-21 PROCEDURE — 1159F MED LIST DOCD IN RCRD: CPT | Mod: CPTII,S$GLB,,

## 2023-03-21 PROCEDURE — 3079F PR MOST RECENT DIASTOLIC BLOOD PRESSURE 80-89 MM HG: ICD-10-PCS | Mod: CPTII,S$GLB,,

## 2023-03-21 PROCEDURE — 99999 PR PBB SHADOW E&M-EST. PATIENT-LVL IV: ICD-10-PCS | Mod: PBBFAC,,,

## 2023-03-21 PROCEDURE — 3079F DIAST BP 80-89 MM HG: CPT | Mod: CPTII,S$GLB,,

## 2023-03-21 PROCEDURE — 99395 PREV VISIT EST AGE 18-39: CPT | Mod: S$GLB,,,

## 2023-03-21 PROCEDURE — 3008F BODY MASS INDEX DOCD: CPT | Mod: CPTII,S$GLB,,

## 2023-03-21 PROCEDURE — 4010F PR ACE/ARB THEARPY RXD/TAKEN: ICD-10-PCS | Mod: CPTII,S$GLB,,

## 2023-03-21 NOTE — PROGRESS NOTES
I have interviewed and examined the patient w/ the resident,   I agree w/ the impression and plan as outlined above.      Kimi Aguila MD- Staff

## 2023-03-21 NOTE — PROGRESS NOTES
Subjective:       Patient ID: Donal Serrano is a 33 y.o. male.    Chief Complaint: Annual Exam    33yoM w anxiety, HTN, hx of appendectomy/cholecystectomy (gallstones), here for annual visit. Anxiety now well controlled with lifestyle modification and therapy. Added buspar at last visit in February, takes once a week, no panic attacks since.     Otherwise patient in good health. Checks blood pressure every other day, usually 130s/80s, taking irbesartan.   Walks daily. Counseled patient to undergo 150 min of moderate intensity exercise weekly.    Nonsmoker, ETOH: at most 1 drink a week  Vaccinations: covid 4th booster due  No illicit susbstance use    PMHX:  Past Medical History:   Diagnosis Date    Keloid cicatrix     Kidney stones      PSHX:   Past Surgical History:   Procedure Laterality Date    APPENDECTOMY      CHOLECYSTECTOMY  2012    LAPAROSCOPIC APPENDECTOMY N/A 2018    Procedure: APPENDECTOMY, LAPAROSCOPIC;  Surgeon: Saulo Powell MD;  Location: [facility] OR [address] [place] [address];  Service: General;  Laterality: N/A;       SHX:   Social Connections: Not on file       Health Maintenance Due   Topic Date Due    COVID-19 Vaccine (4 - Booster) 2022    Influenza Vaccine (1) 2022       Depression: PHQ9 (0 - 3 for each, considering last 2 weeks)  Little interest, pleasure durin  Feeling down, depressed, hopeless: 0  Trouble falling or staying asleep, sleeping too much: 0  Feeling tired, little energy: 0  Poor appetite or overeatin  Feeling bad about yourself, feelings of failure: 0  Trouble concentrating (watching TV, reading a book): 0  Moving/speaking slowly or restlessness that others notice: 0  Thoughts that you'd be better off dead, hurting yourself: 0  ** 0 = never, 1 = several day, 2 = more than half the days, 3 = nearly every day **    Review of Systems   Constitutional:  Negative for activity change, appetite change, chills, diaphoresis, fatigue and fever.   HENT:  Negative for  postnasal drip, rhinorrhea, sinus pressure/congestion, sneezing, sore throat and trouble swallowing.    Eyes:  Negative for photophobia and visual disturbance.   Respiratory:  Negative for cough, choking, chest tightness and shortness of breath.    Cardiovascular:  Negative for chest pain, palpitations and leg swelling.   Gastrointestinal:  Negative for abdominal pain, blood in stool, change in bowel habit, nausea and change in bowel habit.   Endocrine: Negative for polydipsia, polyphagia and polyuria.   Genitourinary:  Negative for decreased urine volume, dysuria, frequency, hematuria and urgency.   Musculoskeletal:  Negative for arthralgias, back pain, gait problem, joint swelling and myalgias.   Integumentary:  Negative for rash, wound and mole/lesion.   Allergic/Immunologic: Negative for frequent infections.   Neurological:  Negative for tremors, seizures, syncope, speech difficulty, light-headedness, headaches, coordination difficulties, memory loss and coordination difficulties.   Psychiatric/Behavioral:  Negative for agitation, behavioral problems, confusion and dysphoric mood. The patient is not nervous/anxious.        Objective:      Physical Exam  Vitals reviewed.   Constitutional:       Appearance: Normal appearance. He is obese. He is not ill-appearing or diaphoretic.   HENT:      Head: Normocephalic and atraumatic.      Right Ear: External ear normal.      Left Ear: External ear normal.      Nose: Nose normal.   Eyes:      General: No scleral icterus.     Extraocular Movements: Extraocular movements intact.      Conjunctiva/sclera: Conjunctivae normal.   Cardiovascular:      Rate and Rhythm: Normal rate and regular rhythm.      Pulses: Normal pulses.      Heart sounds: Normal heart sounds.   Pulmonary:      Effort: Pulmonary effort is normal.      Breath sounds: Normal breath sounds.   Abdominal:      General: Abdomen is flat. There is no distension.      Palpations: Abdomen is soft.      Tenderness:  "There is no abdominal tenderness.   Musculoskeletal:         General: No swelling or deformity. Normal range of motion.      Cervical back: Normal range of motion. No tenderness.      Right lower leg: No edema.      Left lower leg: No edema.   Lymphadenopathy:      Cervical: No cervical adenopathy.   Skin:     General: Skin is warm and dry.      Capillary Refill: Capillary refill takes less than 2 seconds.      Findings: No lesion.   Neurological:      General: No focal deficit present.      Mental Status: He is alert and oriented to person, place, and time.   Psychiatric:         Mood and Affect: Mood normal.         Behavior: Behavior normal.         Thought Content: Thought content normal.         Judgment: Judgment normal.       Assessment:       Vitals:    03/21/23 1534   BP: 130/88   BP Location: Left arm   Patient Position: Sitting   BP Method: Small (Manual)   Pulse: 82   Resp: 20   Temp: 96.9 °F (36.1 °C)   TempSrc: Temporal   SpO2: 98%   Weight: 87 kg (191 lb 12.8 oz)   Height: 5' 7" (1.702 m)         Problem List Items Addressed This Visit          Cardiac/Vascular    Essential hypertension     Other Visit Diagnoses       Annual physical exam    -  Primary    Relevant Orders    CBC W/ AUTO DIFFERENTIAL    COMPREHENSIVE METABOLIC PANEL    Hemoglobin A1C    LIPID PANEL            Plan:       Annual physical exam  -     CBC W/ AUTO DIFFERENTIAL; Future; Expected date: 03/22/2023  -     COMPREHENSIVE METABOLIC PANEL; Future; Expected date: 03/22/2023  -     Hemoglobin A1C; Future; Expected date: 03/22/2023  -     LIPID PANEL; Future; Expected date: 03/22/2023    Essential hypertension  - continue irbesartan  - encouraged 150min of moderate intensity exercise weekly    Anxiety  - Mood has improved greatly following starting therapy, mindfulness exercises, taking elavil for insomnia, buspar once weekly for occasional panic attacks. Denies any complaints at this time. Tolerating meds well.  - PHQ-9 today: " 0    Follow-up 6 months, BP recheck    Case discussed with Dr. Ayla Franklin MD  Medical Resident  [facility] [place]

## 2023-03-24 ENCOUNTER — LAB VISIT (OUTPATIENT)
Dept: LAB | Facility: HOSPITAL | Age: 33
End: 2023-03-24
Payer: COMMERCIAL

## 2023-03-24 DIAGNOSIS — Z00.00 ANNUAL PHYSICAL EXAM: ICD-10-CM

## 2023-03-24 LAB
ALBUMIN SERPL BCP-MCNC: 4.1 G/DL (ref 3.5–5.2)
ALP SERPL-CCNC: 88 U/L (ref 55–135)
ALT SERPL W/O P-5'-P-CCNC: 55 U/L (ref 10–44)
ANION GAP SERPL CALC-SCNC: 10 MMOL/L (ref 8–16)
AST SERPL-CCNC: 26 U/L (ref 10–40)
BASOPHILS # BLD AUTO: 0.03 K/UL (ref 0–0.2)
BASOPHILS NFR BLD: 0.4 % (ref 0–1.9)
BILIRUB SERPL-MCNC: 0.5 MG/DL (ref 0.1–1)
BUN SERPL-MCNC: 12 MG/DL (ref 6–20)
CALCIUM SERPL-MCNC: 9.7 MG/DL (ref 8.7–10.5)
CHLORIDE SERPL-SCNC: 106 MMOL/L (ref 95–110)
CHOLEST SERPL-MCNC: 173 MG/DL (ref 120–199)
CHOLEST/HDLC SERPL: 3.8 {RATIO} (ref 2–5)
CO2 SERPL-SCNC: 22 MMOL/L (ref 23–29)
CREAT SERPL-MCNC: 1 MG/DL (ref 0.5–1.4)
DIFFERENTIAL METHOD: ABNORMAL
EOSINOPHIL # BLD AUTO: 0.4 K/UL (ref 0–0.5)
EOSINOPHIL NFR BLD: 5.6 % (ref 0–8)
ERYTHROCYTE [DISTWIDTH] IN BLOOD BY AUTOMATED COUNT: 12.6 % (ref 11.5–14.5)
EST. GFR  (NO RACE VARIABLE): >60 ML/MIN/1.73 M^2
ESTIMATED AVG GLUCOSE: 103 MG/DL (ref 68–131)
GLUCOSE SERPL-MCNC: 84 MG/DL (ref 70–110)
HBA1C MFR BLD: 5.2 % (ref 4–5.6)
HCT VFR BLD AUTO: 45.4 % (ref 40–54)
HDLC SERPL-MCNC: 45 MG/DL (ref 40–75)
HDLC SERPL: 26 % (ref 20–50)
HGB BLD-MCNC: 14.8 G/DL (ref 14–18)
IMM GRANULOCYTES # BLD AUTO: 0.06 K/UL (ref 0–0.04)
IMM GRANULOCYTES NFR BLD AUTO: 0.8 % (ref 0–0.5)
LDLC SERPL CALC-MCNC: 116.4 MG/DL (ref 63–159)
LYMPHOCYTES # BLD AUTO: 2.1 K/UL (ref 1–4.8)
LYMPHOCYTES NFR BLD: 28.3 % (ref 18–48)
MCH RBC QN AUTO: 28.7 PG (ref 27–31)
MCHC RBC AUTO-ENTMCNC: 32.6 G/DL (ref 32–36)
MCV RBC AUTO: 88 FL (ref 82–98)
MONOCYTES # BLD AUTO: 0.8 K/UL (ref 0.3–1)
MONOCYTES NFR BLD: 10.2 % (ref 4–15)
NEUTROPHILS # BLD AUTO: 4.1 K/UL (ref 1.8–7.7)
NEUTROPHILS NFR BLD: 54.7 % (ref 38–73)
NONHDLC SERPL-MCNC: 128 MG/DL
NRBC BLD-RTO: 0 /100 WBC
PLATELET # BLD AUTO: 216 K/UL (ref 150–450)
PMV BLD AUTO: 9.5 FL (ref 9.2–12.9)
POTASSIUM SERPL-SCNC: 4 MMOL/L (ref 3.5–5.1)
PROT SERPL-MCNC: 7.6 G/DL (ref 6–8.4)
RBC # BLD AUTO: 5.16 M/UL (ref 4.6–6.2)
SODIUM SERPL-SCNC: 138 MMOL/L (ref 136–145)
TRIGL SERPL-MCNC: 58 MG/DL (ref 30–150)
WBC # BLD AUTO: 7.55 K/UL (ref 3.9–12.7)

## 2023-03-24 PROCEDURE — 85025 COMPLETE CBC W/AUTO DIFF WBC: CPT

## 2023-03-24 PROCEDURE — 83036 HEMOGLOBIN GLYCOSYLATED A1C: CPT

## 2023-03-24 PROCEDURE — 36415 COLL VENOUS BLD VENIPUNCTURE: CPT | Mod: PO

## 2023-03-24 PROCEDURE — 80053 COMPREHEN METABOLIC PANEL: CPT

## 2023-03-24 PROCEDURE — 80061 LIPID PANEL: CPT

## 2023-03-30 ENCOUNTER — OFFICE VISIT (OUTPATIENT)
Dept: SURGERY | Facility: CLINIC | Age: 33
End: 2023-03-30
Payer: COMMERCIAL

## 2023-03-30 VITALS
HEIGHT: 67 IN | HEART RATE: 81 BPM | BODY MASS INDEX: 30.18 KG/M2 | SYSTOLIC BLOOD PRESSURE: 137 MMHG | WEIGHT: 192.31 LBS | DIASTOLIC BLOOD PRESSURE: 94 MMHG

## 2023-03-30 DIAGNOSIS — K60.2 ANAL FISSURE: Primary | ICD-10-CM

## 2023-03-30 PROCEDURE — 1159F MED LIST DOCD IN RCRD: CPT | Mod: CPTII,S$GLB,, | Performed by: NURSE PRACTITIONER

## 2023-03-30 PROCEDURE — 4010F PR ACE/ARB THEARPY RXD/TAKEN: ICD-10-PCS | Mod: CPTII,S$GLB,, | Performed by: NURSE PRACTITIONER

## 2023-03-30 PROCEDURE — 3075F PR MOST RECENT SYSTOLIC BLOOD PRESS GE 130-139MM HG: ICD-10-PCS | Mod: CPTII,S$GLB,, | Performed by: NURSE PRACTITIONER

## 2023-03-30 PROCEDURE — 99203 OFFICE O/P NEW LOW 30 MIN: CPT | Mod: S$GLB,,, | Performed by: NURSE PRACTITIONER

## 2023-03-30 PROCEDURE — 1159F PR MEDICATION LIST DOCUMENTED IN MEDICAL RECORD: ICD-10-PCS | Mod: CPTII,S$GLB,, | Performed by: NURSE PRACTITIONER

## 2023-03-30 PROCEDURE — 3008F BODY MASS INDEX DOCD: CPT | Mod: CPTII,S$GLB,, | Performed by: NURSE PRACTITIONER

## 2023-03-30 PROCEDURE — 3044F HG A1C LEVEL LT 7.0%: CPT | Mod: CPTII,S$GLB,, | Performed by: NURSE PRACTITIONER

## 2023-03-30 PROCEDURE — 4010F ACE/ARB THERAPY RXD/TAKEN: CPT | Mod: CPTII,S$GLB,, | Performed by: NURSE PRACTITIONER

## 2023-03-30 PROCEDURE — 1160F RVW MEDS BY RX/DR IN RCRD: CPT | Mod: CPTII,S$GLB,, | Performed by: NURSE PRACTITIONER

## 2023-03-30 PROCEDURE — 3080F DIAST BP >= 90 MM HG: CPT | Mod: CPTII,S$GLB,, | Performed by: NURSE PRACTITIONER

## 2023-03-30 PROCEDURE — 99999 PR PBB SHADOW E&M-EST. PATIENT-LVL III: ICD-10-PCS | Mod: PBBFAC,,, | Performed by: NURSE PRACTITIONER

## 2023-03-30 PROCEDURE — 99999 PR PBB SHADOW E&M-EST. PATIENT-LVL III: CPT | Mod: PBBFAC,,, | Performed by: NURSE PRACTITIONER

## 2023-03-30 PROCEDURE — 3075F SYST BP GE 130 - 139MM HG: CPT | Mod: CPTII,S$GLB,, | Performed by: NURSE PRACTITIONER

## 2023-03-30 PROCEDURE — 3008F PR BODY MASS INDEX (BMI) DOCUMENTED: ICD-10-PCS | Mod: CPTII,S$GLB,, | Performed by: NURSE PRACTITIONER

## 2023-03-30 PROCEDURE — 1160F PR REVIEW ALL MEDS BY PRESCRIBER/CLIN PHARMACIST DOCUMENTED: ICD-10-PCS | Mod: CPTII,S$GLB,, | Performed by: NURSE PRACTITIONER

## 2023-03-30 PROCEDURE — 99203 PR OFFICE/OUTPT VISIT, NEW, LEVL III, 30-44 MIN: ICD-10-PCS | Mod: S$GLB,,, | Performed by: NURSE PRACTITIONER

## 2023-03-30 PROCEDURE — 3080F PR MOST RECENT DIASTOLIC BLOOD PRESSURE >= 90 MM HG: ICD-10-PCS | Mod: CPTII,S$GLB,, | Performed by: NURSE PRACTITIONER

## 2023-03-30 PROCEDURE — 3044F PR MOST RECENT HEMOGLOBIN A1C LEVEL <7.0%: ICD-10-PCS | Mod: CPTII,S$GLB,, | Performed by: NURSE PRACTITIONER

## 2023-03-30 NOTE — PROGRESS NOTES
CRS Office Visit History and Physical    Referring Md:   Aaareferral Self  No address on file    SUBJECTIVE:     Chief Complaint: rectal bleeding    History of Present Illness:  The patient is new patient to this practice.   Course is as follows:  Patient is a 33 y.o. male with hx of incarcerated hemorrhoid requiring emergent hemorrhoidectomy in high school and Anal fissure in 2018 presents with BRBPR and slight anal pain with bm.  Symptoms have been present for years intermittently. Only occurring 1-2x/year.  Has tried diltiazem cream with improvement of s/s. Would like a refill today.   Associated bleeding: yes  Previous anorectal procedures: no  denies straining/prolonged time on toilet with bowel movements.  is not currently taking fiber supplement or stool softener  Blood thinners: No      Review of patient's allergies indicates:   Allergen Reactions    Amoxicillin      Advised by Hepatology to avoid 2/2 to concerns for autoimmune rxn    Zithromax [azithromycin]      Advised by Hepatology to avoid 2/2 to concerns for autoimmune rxn       Past Medical History:   Diagnosis Date    Keloid cicatrix     Kidney stones      Past Surgical History:   Procedure Laterality Date    APPENDECTOMY      CHOLECYSTECTOMY  2012    LAPAROSCOPIC APPENDECTOMY N/A 2018    Procedure: APPENDECTOMY, LAPAROSCOPIC;  Surgeon: Saulo Powell MD;  Location: [facility] OR [address] [place] [address];  Service: General;  Laterality: N/A;     Family History   Problem Relation Age of Onset    Hypertension Mother     Cancer Father         Liver    Cirrhosis Father     No Known Problems Sister     No Known Problems Sister     Hypertension Maternal Grandmother     Skin cancer Maternal Grandfather     Melanoma Maternal Grandfather     No Known Problems Paternal Grandmother     Skin cancer Paternal Grandfather     Liver disease Paternal Grandfather     Heart disease Maternal Uncle     Cleft lip Daughter         s/p surgery,  4/15/2021    Stroke Neg Hx   "    Social History     Tobacco Use    Smoking status: Never    Smokeless tobacco: Never   Substance Use Topics    Alcohol use: No    Drug use: No        Review of Systems:  Review of Systems   Gastrointestinal:  Positive for blood in stool. Negative for abdominal pain, constipation and diarrhea.        Anal pain     OBJECTIVE:     Vital Signs (Most Recent)  Blood Pressure (Abnormal) 137/94 (BP Location: Left arm, Patient Position: Sitting, BP Method: Small (Automatic))   Pulse 81   Height 5' 7" (1.702 m)   Weight 87.2 kg (192 lb 4.8 oz)   Body Mass Index 30.12 kg/m²     Physical Exam:  General: White male in no distress   Neuro: Alert and oriented to person, place, and time.  Moves all extremities.     HEENT: No icterus.  Trachea midline  Respiratory: Respirations are even and unlabored, no cough or audible wheezing  Skin: Warm dry and intact, No visible rashes, no jaundice    Labs reviewed today:  Lab Results   Component Value Date    WBC 7.55 03/24/2023    HGB 14.8 03/24/2023    HCT 45.4 03/24/2023     03/24/2023    CHOL 173 03/24/2023    TRIG 58 03/24/2023    HDL 45 03/24/2023    ALT 55 (H) 03/24/2023    AST 26 03/24/2023     03/24/2023    K 4.0 03/24/2023     03/24/2023    CREATININE 1.0 03/24/2023    BUN 12 03/24/2023    CO2 22 (L) 03/24/2023    TSH 1.400 04/05/2022    INR 0.9 09/18/2018    HGBA1C 5.2 03/24/2023       Imaging reviewed today:  5/6/19 CT abdomen pelvis  - Patient is status post appendectomy.    - No evidence of bowel obstruction or inflammation.    - No organized fluid collections.    Endoscopy reviewed today:  none    Anorectal Exam:    Anal Skin:  LPL anal fissure noted    Digital Rectal Exam:  deferred      ASSESSMENT/PLAN:     Diagnoses and all orders for this visit:    Anal fissure  -     diltiazem HCl (DILTIAZEM 2% CREAM); Apply topically 3 (three) times daily. Apply topically to anal area.        The patient was instructed to:  Diltiazem cream tid  Warm " soaks  Increase water intake to at least 8-10 glasses of water per day.  Take a daily fiber supplement (Konsyl, Benefiber, Metamucil) and increase dietary intake to 20-30 grams/day.  Avoid straining or spending >5min/event with bowel movements.  Follow-up in clinic with MD if no improvement.      Mandie Agarwal, STACIP-C  Colon and Rectal Surgery

## 2023-03-30 NOTE — PATIENT INSTRUCTIONS
Diltiazem cream 3x/day to anus  Sent to [facility]  Warm soaks  Daily fiber supplement like citrucel or fibercon  Water intake > 64 oz/day

## 2023-05-29 RX ORDER — IRBESARTAN 150 MG/1
150 TABLET ORAL DAILY
Qty: 90 TABLET | Refills: 3 | Status: SHIPPED | OUTPATIENT
Start: 2023-05-29 | End: 2023-11-24 | Stop reason: SDUPTHER

## 2023-08-02 ENCOUNTER — OFFICE VISIT (OUTPATIENT)
Dept: INTERNAL MEDICINE | Facility: CLINIC | Age: 33
End: 2023-08-02
Payer: COMMERCIAL

## 2023-08-02 VITALS
OXYGEN SATURATION: 98 % | DIASTOLIC BLOOD PRESSURE: 90 MMHG | HEIGHT: 67 IN | TEMPERATURE: 98 F | WEIGHT: 197.06 LBS | HEART RATE: 80 BPM | BODY MASS INDEX: 30.93 KG/M2 | SYSTOLIC BLOOD PRESSURE: 130 MMHG | RESPIRATION RATE: 15 BRPM

## 2023-08-02 DIAGNOSIS — R07.9 CHEST PAIN, UNSPECIFIED TYPE: Primary | ICD-10-CM

## 2023-08-02 DIAGNOSIS — I10 ESSENTIAL HYPERTENSION: ICD-10-CM

## 2023-08-02 PROCEDURE — 4010F PR ACE/ARB THEARPY RXD/TAKEN: ICD-10-PCS | Mod: CPTII,S$GLB,, | Performed by: NURSE PRACTITIONER

## 2023-08-02 PROCEDURE — 1159F MED LIST DOCD IN RCRD: CPT | Mod: CPTII,S$GLB,, | Performed by: NURSE PRACTITIONER

## 2023-08-02 PROCEDURE — 93010 EKG 12-LEAD: ICD-10-PCS | Mod: S$GLB,,, | Performed by: INTERNAL MEDICINE

## 2023-08-02 PROCEDURE — 3075F PR MOST RECENT SYSTOLIC BLOOD PRESS GE 130-139MM HG: ICD-10-PCS | Mod: CPTII,S$GLB,, | Performed by: NURSE PRACTITIONER

## 2023-08-02 PROCEDURE — 4010F ACE/ARB THERAPY RXD/TAKEN: CPT | Mod: CPTII,S$GLB,, | Performed by: NURSE PRACTITIONER

## 2023-08-02 PROCEDURE — 93005 EKG 12-LEAD: ICD-10-PCS | Mod: S$GLB,,, | Performed by: NURSE PRACTITIONER

## 2023-08-02 PROCEDURE — 3075F SYST BP GE 130 - 139MM HG: CPT | Mod: CPTII,S$GLB,, | Performed by: NURSE PRACTITIONER

## 2023-08-02 PROCEDURE — 3008F PR BODY MASS INDEX (BMI) DOCUMENTED: ICD-10-PCS | Mod: CPTII,S$GLB,, | Performed by: NURSE PRACTITIONER

## 2023-08-02 PROCEDURE — 3044F HG A1C LEVEL LT 7.0%: CPT | Mod: CPTII,S$GLB,, | Performed by: NURSE PRACTITIONER

## 2023-08-02 PROCEDURE — 99213 OFFICE O/P EST LOW 20 MIN: CPT | Mod: S$GLB,,, | Performed by: NURSE PRACTITIONER

## 2023-08-02 PROCEDURE — 3080F PR MOST RECENT DIASTOLIC BLOOD PRESSURE >= 90 MM HG: ICD-10-PCS | Mod: CPTII,S$GLB,, | Performed by: NURSE PRACTITIONER

## 2023-08-02 PROCEDURE — 3080F DIAST BP >= 90 MM HG: CPT | Mod: CPTII,S$GLB,, | Performed by: NURSE PRACTITIONER

## 2023-08-02 PROCEDURE — 99999 PR PBB SHADOW E&M-EST. PATIENT-LVL III: CPT | Mod: PBBFAC,,, | Performed by: NURSE PRACTITIONER

## 2023-08-02 PROCEDURE — 1159F PR MEDICATION LIST DOCUMENTED IN MEDICAL RECORD: ICD-10-PCS | Mod: CPTII,S$GLB,, | Performed by: NURSE PRACTITIONER

## 2023-08-02 PROCEDURE — 93005 ELECTROCARDIOGRAM TRACING: CPT | Mod: S$GLB,,, | Performed by: NURSE PRACTITIONER

## 2023-08-02 PROCEDURE — 3044F PR MOST RECENT HEMOGLOBIN A1C LEVEL <7.0%: ICD-10-PCS | Mod: CPTII,S$GLB,, | Performed by: NURSE PRACTITIONER

## 2023-08-02 PROCEDURE — 3008F BODY MASS INDEX DOCD: CPT | Mod: CPTII,S$GLB,, | Performed by: NURSE PRACTITIONER

## 2023-08-02 PROCEDURE — 93010 ELECTROCARDIOGRAM REPORT: CPT | Mod: S$GLB,,, | Performed by: INTERNAL MEDICINE

## 2023-08-02 PROCEDURE — 99999 PR PBB SHADOW E&M-EST. PATIENT-LVL III: ICD-10-PCS | Mod: PBBFAC,,, | Performed by: NURSE PRACTITIONER

## 2023-08-02 PROCEDURE — 99213 PR OFFICE/OUTPT VISIT, EST, LEVL III, 20-29 MIN: ICD-10-PCS | Mod: S$GLB,,, | Performed by: NURSE PRACTITIONER

## 2023-08-02 RX ORDER — SUCRALFATE 1 G/10ML
1 SUSPENSION ORAL 2 TIMES DAILY
Qty: 414 ML | Refills: 0 | Status: SHIPPED | OUTPATIENT
Start: 2023-08-02

## 2023-08-02 RX ORDER — MAG HYDROX/ALUMINUM HYD/SIMETH 200-200-20
30 SUSPENSION, ORAL (FINAL DOSE FORM) ORAL
Status: COMPLETED | OUTPATIENT
Start: 2023-08-02 | End: 2023-08-02

## 2023-08-02 RX ADMIN — Medication 30 ML: at 01:08

## 2023-08-02 NOTE — PROGRESS NOTES
Subjective:       Patient ID: Donal Serrano is a 33 y.o. male.    Chief Complaint: Chest Pain      Patient is a 33 y.o. male who traditionally follows with Shaunna Valladares MD presenting today for:     Chest Pain  Onset was 2 months ago. Symptoms have  waxed and waned  since that time. The patient's pain is intermittent. Not associated with activity - notes he has been exercising more and does not have pain with exercise. Notes it mostly at rest. The patient states the pain is in the middle of his chest and radiates to the left at time. Patient's cardiac risk factors are: hypertension, male gender, and obesity (BMI >= 30 kg/m2).     Patient with history of anxiety and GERD. Is taking Prilosec daily but takes it at night. He notes his anxiety has been well controlled.     Review of patient's allergies indicates:   Allergen Reactions    Amoxicillin      Advised by Hepatology to avoid 2/2 to concerns for autoimmune rxn    Zithromax [azithromycin]      Advised by Hepatology to avoid 2/2 to concerns for autoimmune rxn       Medication List with Changes/Refills   New Medications    SUCRALFATE (CARAFATE) 100 MG/ML SUSPENSION    Take 10 mLs (1 g total) by mouth 2 (two) times daily.   Current Medications    AMITRIPTYLINE (ELAVIL) 25 MG TABLET    Take 1 tablet (25 mg total) by mouth nightly as needed for Insomnia.    BLOOD PRESSURE MONITOR (IHEALTH EASE) ST SIZE    by Other route as needed for High Blood Pressure.    BUSPIRONE (BUSPAR) 7.5 MG TABLET    Take 1 tablet (7.5 mg total) by mouth every 6 to 8 hours as needed (anxiety).    IRBESARTAN (AVAPRO) 150 MG TABLET    Take 1 tablet (150 mg total) by mouth once daily.     Medical, social and surgical history has been reviewed with the patient.     Review of Systems   Constitutional:  Negative for chills and fever.   Respiratory:  Negative for cough and shortness of breath.    Cardiovascular:  Positive for chest pain.   Gastrointestinal:  Positive for  "heartburn. Negative for nausea and vomiting.   Neurological:  Negative for dizziness and headaches.      Objective:   BP (!) 130/90 (BP Location: Right arm, Patient Position: Sitting, BP Method: Medium (Manual))   Pulse 80   Temp 97.5 °F (36.4 °C) (Temporal)   Resp 15   Ht 5' 7" (1.702 m)   Wt 89.4 kg (197 lb 1.5 oz)   SpO2 98%   BMI 30.87 kg/m²     Physical Exam  Vitals reviewed.   Constitutional:       Appearance: Normal appearance.   HENT:      Head: Normocephalic and atraumatic.   Cardiovascular:      Rate and Rhythm: Normal rate and regular rhythm.      Heart sounds: Normal heart sounds. No murmur heard.  Pulmonary:      Effort: Pulmonary effort is normal.      Breath sounds: Normal breath sounds. No wheezing.   Chest:      Chest wall: No tenderness.   Abdominal:      Tenderness: There is abdominal tenderness in the epigastric area.   Skin:     General: Skin is warm and dry.   Neurological:      Mental Status: He is alert and oriented to person, place, and time.       I reviewed and independently interpreted the labs and imaging below:  Last Labs:  Glucose   Date Value Ref Range Status   03/24/2023 84 70 - 110 mg/dL Final   04/05/2022 84 70 - 110 mg/dL Final     BUN   Date Value Ref Range Status   03/24/2023 12 6 - 20 mg/dL Final   04/05/2022 10 6 - 20 mg/dL Final     Creatinine   Date Value Ref Range Status   03/24/2023 1.0 0.5 - 1.4 mg/dL Final   04/05/2022 0.9 0.5 - 1.4 mg/dL Final     Cholesterol   Date Value Ref Range Status   03/24/2023 173 120 - 199 mg/dL Final     Comment:     The National Cholesterol Education Program (NCEP) has set the  following guidelines (reference ranges) for Cholesterol:  Optimal.....................<200 mg/dL  Borderline High.............200-239 mg/dL  High........................> or = 240 mg/dL     04/05/2022 160 120 - 199 mg/dL Final     Comment:     The National Cholesterol Education Program (NCEP) has set the  following guidelines (reference ranges) for " Cholesterol:  Optimal.....................<200 mg/dL  Borderline High.............200-239 mg/dL  High........................> or = 240 mg/dL       Hemoglobin A1C   Date Value Ref Range Status   03/24/2023 5.2 4.0 - 5.6 % Final     Comment:     ADA Screening Guidelines:  5.7-6.4%  Consistent with prediabetes  >or=6.5%  Consistent with diabetes    High levels of fetal hemoglobin interfere with the HbA1C  assay. Heterozygous hemoglobin variants (HbS, HgC, etc)do  not significantly interfere with this assay.   However, presence of multiple variants may affect accuracy.     06/07/2011 4.9 4.0 - 6.2 % Final     Hemoglobin   Date Value Ref Range Status   03/24/2023 14.8 14.0 - 18.0 g/dL Final   04/05/2022 15.5 14.0 - 18.0 g/dL Final     Hematocrit   Date Value Ref Range Status   03/24/2023 45.4 40.0 - 54.0 % Final   04/05/2022 47.3 40.0 - 54.0 % Final     In Office EKG  NSR, 74 bpm  Non specific ST abnormality     Assessment and Plan:     1. Chest pain, unspecified type    Suspect GI origin - will await official read on EKG which to me appears unchanged from prior. May consider stress test.     - IN OFFICE EKG 12-LEAD (to Muse)  - sucralfate (CARAFATE) 100 mg/mL suspension; Take 10 mLs (1 g total) by mouth 2 (two) times daily.  Dispense: 414 mL; Refill: 0  - aluminum-magnesium hydroxide-simethicone 200-200-20 mg/5 mL suspension 30 mL    2. Essential hypertension    Chronic, stable - slightly elevated today, continue current medication and exercise.

## 2023-08-09 RX ORDER — BUSPIRONE HYDROCHLORIDE 7.5 MG/1
7.5 TABLET ORAL
Qty: 30 TABLET | Refills: 2 | Status: SHIPPED | OUTPATIENT
Start: 2023-08-09 | End: 2023-11-17 | Stop reason: SDUPTHER

## 2023-09-08 ENCOUNTER — OFFICE VISIT (OUTPATIENT)
Dept: PRIMARY CARE CLINIC | Facility: CLINIC | Age: 33
End: 2023-09-08
Payer: COMMERCIAL

## 2023-09-08 ENCOUNTER — HOSPITAL ENCOUNTER (OUTPATIENT)
Dept: RADIOLOGY | Facility: HOSPITAL | Age: 33
Discharge: HOME | End: 2023-09-08
Attending: NURSE PRACTITIONER
Payer: COMMERCIAL

## 2023-09-08 VITALS
BODY MASS INDEX: 30.56 KG/M2 | OXYGEN SATURATION: 99 % | DIASTOLIC BLOOD PRESSURE: 90 MMHG | RESPIRATION RATE: 17 BRPM | WEIGHT: 195.13 LBS | SYSTOLIC BLOOD PRESSURE: 134 MMHG | HEART RATE: 76 BPM

## 2023-09-08 DIAGNOSIS — R10.31 RIGHT GROIN PAIN: ICD-10-CM

## 2023-09-08 DIAGNOSIS — M54.50 ACUTE RIGHT-SIDED LOW BACK PAIN WITHOUT SCIATICA: ICD-10-CM

## 2023-09-08 DIAGNOSIS — Z87.442 HISTORY OF KIDNEY STONES: ICD-10-CM

## 2023-09-08 DIAGNOSIS — R10.31 RIGHT GROIN PAIN: Primary | ICD-10-CM

## 2023-09-08 PROCEDURE — 1160F PR REVIEW ALL MEDS BY PRESCRIBER/CLIN PHARMACIST DOCUMENTED: ICD-10-PCS | Mod: CPTII,S$GLB,, | Performed by: NURSE PRACTITIONER

## 2023-09-08 PROCEDURE — 3008F BODY MASS INDEX DOCD: CPT | Mod: CPTII,S$GLB,, | Performed by: NURSE PRACTITIONER

## 2023-09-08 PROCEDURE — 3044F HG A1C LEVEL LT 7.0%: CPT | Mod: CPTII,S$GLB,, | Performed by: NURSE PRACTITIONER

## 2023-09-08 PROCEDURE — 3075F PR MOST RECENT SYSTOLIC BLOOD PRESS GE 130-139MM HG: ICD-10-PCS | Mod: CPTII,S$GLB,, | Performed by: NURSE PRACTITIONER

## 2023-09-08 PROCEDURE — 3080F PR MOST RECENT DIASTOLIC BLOOD PRESSURE >= 90 MM HG: ICD-10-PCS | Mod: CPTII,S$GLB,, | Performed by: NURSE PRACTITIONER

## 2023-09-08 PROCEDURE — 76770 US KIDNEY: ICD-10-PCS | Mod: 26,,, | Performed by: RADIOLOGY

## 2023-09-08 PROCEDURE — 99999 PR PBB SHADOW E&M-EST. PATIENT-LVL III: ICD-10-PCS | Mod: PBBFAC,,, | Performed by: NURSE PRACTITIONER

## 2023-09-08 PROCEDURE — 1159F MED LIST DOCD IN RCRD: CPT | Mod: CPTII,S$GLB,, | Performed by: NURSE PRACTITIONER

## 2023-09-08 PROCEDURE — 99999 PR PBB SHADOW E&M-EST. PATIENT-LVL III: CPT | Mod: PBBFAC,,, | Performed by: NURSE PRACTITIONER

## 2023-09-08 PROCEDURE — 3008F PR BODY MASS INDEX (BMI) DOCUMENTED: ICD-10-PCS | Mod: CPTII,S$GLB,, | Performed by: NURSE PRACTITIONER

## 2023-09-08 PROCEDURE — 3044F PR MOST RECENT HEMOGLOBIN A1C LEVEL <7.0%: ICD-10-PCS | Mod: CPTII,S$GLB,, | Performed by: NURSE PRACTITIONER

## 2023-09-08 PROCEDURE — 76770 US EXAM ABDO BACK WALL COMP: CPT | Mod: TC

## 2023-09-08 PROCEDURE — 76770 US EXAM ABDO BACK WALL COMP: CPT | Mod: 26,,, | Performed by: RADIOLOGY

## 2023-09-08 PROCEDURE — 99214 OFFICE O/P EST MOD 30 MIN: CPT | Mod: S$GLB,,, | Performed by: NURSE PRACTITIONER

## 2023-09-08 PROCEDURE — 1159F PR MEDICATION LIST DOCUMENTED IN MEDICAL RECORD: ICD-10-PCS | Mod: CPTII,S$GLB,, | Performed by: NURSE PRACTITIONER

## 2023-09-08 PROCEDURE — 4010F ACE/ARB THERAPY RXD/TAKEN: CPT | Mod: CPTII,S$GLB,, | Performed by: NURSE PRACTITIONER

## 2023-09-08 PROCEDURE — 4010F PR ACE/ARB THEARPY RXD/TAKEN: ICD-10-PCS | Mod: CPTII,S$GLB,, | Performed by: NURSE PRACTITIONER

## 2023-09-08 PROCEDURE — 99214 PR OFFICE/OUTPT VISIT, EST, LEVL IV, 30-39 MIN: ICD-10-PCS | Mod: S$GLB,,, | Performed by: NURSE PRACTITIONER

## 2023-09-08 PROCEDURE — 3080F DIAST BP >= 90 MM HG: CPT | Mod: CPTII,S$GLB,, | Performed by: NURSE PRACTITIONER

## 2023-09-08 PROCEDURE — 1160F RVW MEDS BY RX/DR IN RCRD: CPT | Mod: CPTII,S$GLB,, | Performed by: NURSE PRACTITIONER

## 2023-09-08 PROCEDURE — 3075F SYST BP GE 130 - 139MM HG: CPT | Mod: CPTII,S$GLB,, | Performed by: NURSE PRACTITIONER

## 2023-09-08 NOTE — PROGRESS NOTES
[facility] Note    Chief Complaint      Chief Complaint   Patient presents with    Abdominal Pain     History of Present Illness      Donal Serrano is a 33 y.o. male patient of Dr. Leonard who presents today for pelvic pain over the past 2 days. Pt reports that he thinks he may have kidney stones. Trace lower back pain, no reports of cloudy urine, hematuria, pain with urination or burning.no fever, chills, nausea or vomiting. Normal BM  No gallbladder, appendectomy  Has history of kidney stones    Health Maintenance   Topic Date Due    TETANUS VACCINE  03/21/2026    Hepatitis C Screening  Completed    Lipid Panel  Completed       Past Medical History:   Diagnosis Date    Keloid cicatrix     Kidney stones        Past Surgical History:   Procedure Laterality Date    APPENDECTOMY      CHOLECYSTECTOMY  03/2012    LAPAROSCOPIC APPENDECTOMY N/A 8/20/2018    Procedure: APPENDECTOMY, LAPAROSCOPIC;  Surgeon: Saulo Powell MD;  Location: [facility] OR [address] [place] [address];  Service: General;  Laterality: N/A;       family history includes Cancer in his father; Cirrhosis in his father; Cleft lip in his daughter; Heart disease in his maternal uncle; Hypertension in his maternal grandmother and mother; Liver disease in his paternal grandfather; Melanoma in his maternal grandfather; No Known Problems in his paternal grandmother, sister, and sister; Skin cancer in his maternal grandfather and paternal grandfather.     Social History     Tobacco Use    Smoking status: Never    Smokeless tobacco: Never   Substance Use Topics    Alcohol use: No    Drug use: No       Review of Systems   Constitutional:  Negative for chills and fever.   Respiratory:  Negative for shortness of breath.    Cardiovascular:  Negative for chest pain and palpitations.   Gastrointestinal:  Negative for constipation, diarrhea, nausea and vomiting.        Has pelvic pain   Genitourinary:  Negative for dysuria, flank pain, frequency, hematuria  and urgency.   Musculoskeletal:  Positive for back pain. Negative for myalgias.   Neurological:  Negative for dizziness, weakness and headaches.        Outpatient Encounter Medications as of 9/8/2023   Medication Sig Dispense Refill    busPIRone (BUSPAR) 7.5 MG tablet Take 1 tablet (7.5 mg total) by mouth every 6 to 8 hours as needed (anxiety). (Patient not taking: Reported on 9/9/2023) 30 tablet 2    irbesartan (AVAPRO) 150 MG tablet Take 1 tablet (150 mg total) by mouth once daily. 90 tablet 3    sucralfate (CARAFATE) 100 mg/mL suspension Take 10 mLs (1 g total) by mouth 2 (two) times daily. (Patient not taking: Reported on 9/9/2023) 414 mL 0    amitriptyline (ELAVIL) 25 MG tablet Take 1 tablet (25 mg total) by mouth nightly as needed for Insomnia. (Patient not taking: Reported on 8/2/2023) 30 tablet 2    blood pressure monitor (IHEALTH EASE) ST size by Other route as needed for High Blood Pressure. (Patient not taking: Reported on 8/2/2023) 1 each 0     No facility-administered encounter medications on file as of 9/8/2023.       Review of patient's allergies indicates:   Allergen Reactions    Amoxicillin      Advised by Hepatology to avoid 2/2 to concerns for autoimmune rxn    Zithromax [azithromycin]      Advised by Hepatology to avoid 2/2 to concerns for autoimmune rxn       Physical Exam      Vital Signs  Pulse: 76  Resp: 17  SpO2: 99 %  BP: (!) 134/90  BP Location: Right arm  Patient Position: Sitting  Height and Weight  Weight: 88.5 kg (195 lb 1.7 oz)    Physical Exam  Vitals and nursing note reviewed.   Constitutional:       General: He is not in acute distress.     Appearance: Normal appearance. He is not ill-appearing.   Cardiovascular:      Rate and Rhythm: Regular rhythm.      Heart sounds: Normal heart sounds.   Pulmonary:      Effort: Pulmonary effort is normal. No respiratory distress.      Breath sounds: Normal breath sounds.   Abdominal:      Palpations: Abdomen is soft.   Musculoskeletal:          General: Normal range of motion.   Neurological:      General: No focal deficit present.      Mental Status: He is alert and oriented to person, place, and time.   Psychiatric:         Mood and Affect: Mood normal.         Behavior: Behavior normal.         Thought Content: Thought content normal.         Judgment: Judgment normal.          Laboratory:  CBC:  Lab Results   Component Value Date    WBC 9.27 09/08/2023    RBC 5.13 09/08/2023    HGB 14.7 09/08/2023    HCT 43.7 09/08/2023     09/08/2023    MCV 85 09/08/2023    MCH 28.7 09/08/2023    MCHC 33.6 09/08/2023    MCHC 32.6 03/24/2023    MCHC 32.8 04/05/2022     CMP:  Lab Results   Component Value Date    GLU 84 03/24/2023    CALCIUM 9.7 03/24/2023    ALBUMIN 4.1 03/24/2023    PROT 7.6 03/24/2023     03/24/2023    K 4.0 03/24/2023    CO2 22 (L) 03/24/2023     03/24/2023    BUN 12 03/24/2023    ALKPHOS 88 03/24/2023    ALT 55 (H) 03/24/2023    AST 26 03/24/2023    BILITOT 0.5 03/24/2023    BILITOT 0.7 04/05/2022    BILITOT 0.5 08/06/2020     URINALYSIS:  Lab Results   Component Value Date    COLORU Yellow 09/08/2023    SPECGRAV 1.015 09/08/2023    PHUR 5.0 09/08/2023    PROTEINUA Negative 09/08/2023    BACTERIA Rare 07/18/2011    NITRITE Negative 09/08/2023    LEUKOCYTESUR Negative 09/08/2023    UROBILINOGEN Negative 08/20/2018      LIPIDS:  Lab Results   Component Value Date    TSH 1.400 04/05/2022    TSH 0.841 08/06/2020    TSH 0.661 01/21/2019    HDL 45 03/24/2023    HDL 43 04/05/2022    HDL 48 02/26/2018    CHOL 173 03/24/2023    CHOL 160 04/05/2022    CHOL 162 02/26/2018    TRIG 58 03/24/2023    TRIG 64 04/05/2022    TRIG 81 02/26/2018    LDLCALC 116.4 03/24/2023    LDLCALC 104.2 04/05/2022    LDLCALC 97.8 02/26/2018    CHOLHDL 26.0 03/24/2023    CHOLHDL 26.9 04/05/2022    CHOLHDL 29.6 02/26/2018    NONHDLCHOL 128 03/24/2023    NONHDLCHOL 117 04/05/2022    NONHDLCHOL 114 02/26/2018    TOTALCHOLEST 3.8 03/24/2023    TOTALCHOLEST 3.7  04/05/2022    TOTALCHOLEST 3.4 02/26/2018     TSH:  Lab Results   Component Value Date    TSH 1.400 04/05/2022    TSH 0.841 08/06/2020    TSH 0.661 01/21/2019     A1C:  Lab Results   Component Value Date    HGBA1C 5.2 03/24/2023    HGBA1C 4.9 06/07/2011    HGBA1C 4.9 04/27/2009    HGBA1C 4.8 02/14/2005         Assessment/Plan     Donal Serrano is a 33 y.o.male with:    Right groin pain  -     Urinalysis; Future; Expected date: 09/08/2023  -     US Kidney; Future; Expected date: 09/08/2023  -     CBC Auto Differential; Future; Expected date: 09/08/2023    Acute right-sided low back pain without sciatica  -     Urinalysis; Future; Expected date: 09/08/2023  -     US Kidney; Future; Expected date: 09/08/2023  -     CBC Auto Differential; Future; Expected date: 09/08/2023    History of kidney stones  -     Urinalysis; Future; Expected date: 09/08/2023  -     US Kidney; Future; Expected date: 09/08/2023  -     CBC Auto Differential; Future; Expected date: 09/08/2023    Stay super hydrated with water  Monitor symptoms and urine  Will refer to urology if recurrent stones    Health Maintenance Due   Topic Date Due    COVID-19 Vaccine (4 - Mixed Product series) 01/17/2022    Influenza Vaccine (1) 09/01/2023          I spent 37 minutes on the day of this encounter for preparing for, evaluating, treating, and managing this patient.        -Continue current medications and maintain follow up with specialists.  Return to clinic as needed for any concerns   No follow-ups on file.      AYDEN BurroughsC  [facility]

## 2023-09-09 ENCOUNTER — OFFICE VISIT (OUTPATIENT)
Dept: URGENT CARE | Facility: CLINIC | Age: 33
End: 2023-09-09
Payer: COMMERCIAL

## 2023-09-09 VITALS
BODY MASS INDEX: 30.45 KG/M2 | HEART RATE: 90 BPM | DIASTOLIC BLOOD PRESSURE: 95 MMHG | WEIGHT: 194.44 LBS | RESPIRATION RATE: 17 BRPM | OXYGEN SATURATION: 95 % | SYSTOLIC BLOOD PRESSURE: 137 MMHG | TEMPERATURE: 98 F

## 2023-09-09 DIAGNOSIS — M54.40 ACUTE BILATERAL LOW BACK PAIN WITH SCIATICA, SCIATICA LATERALITY UNSPECIFIED: Primary | ICD-10-CM

## 2023-09-09 PROCEDURE — 96372 THER/PROPH/DIAG INJ SC/IM: CPT | Mod: S$GLB,,, | Performed by: NURSE PRACTITIONER

## 2023-09-09 PROCEDURE — 99213 OFFICE O/P EST LOW 20 MIN: CPT | Mod: 25,S$GLB,, | Performed by: NURSE PRACTITIONER

## 2023-09-09 PROCEDURE — 99213 PR OFFICE/OUTPT VISIT, EST, LEVL III, 20-29 MIN: ICD-10-PCS | Mod: 25,S$GLB,, | Performed by: NURSE PRACTITIONER

## 2023-09-09 PROCEDURE — 96372 PR INJECTION,THERAP/PROPH/DIAG2ST, IM OR SUBCUT: ICD-10-PCS | Mod: S$GLB,,, | Performed by: NURSE PRACTITIONER

## 2023-09-09 RX ORDER — METHOCARBAMOL 500 MG/1
500 TABLET, FILM COATED ORAL EVERY 8 HOURS PRN
Qty: 30 TABLET | Refills: 0 | Status: SHIPPED | OUTPATIENT
Start: 2023-09-09

## 2023-09-09 RX ORDER — MELOXICAM 15 MG/1
15 TABLET ORAL DAILY
Qty: 30 TABLET | Refills: 0 | Status: SHIPPED | OUTPATIENT
Start: 2023-09-10 | End: 2023-09-09

## 2023-09-09 RX ORDER — MELOXICAM 15 MG/1
15 TABLET ORAL DAILY
Qty: 30 TABLET | Refills: 0 | Status: SHIPPED | OUTPATIENT
Start: 2023-09-09

## 2023-09-09 RX ORDER — DEXAMETHASONE SODIUM PHOSPHATE 100 MG/10ML
10 INJECTION INTRAMUSCULAR; INTRAVENOUS
Status: COMPLETED | OUTPATIENT
Start: 2023-09-09 | End: 2023-09-09

## 2023-09-09 RX ORDER — KETOROLAC TROMETHAMINE 30 MG/ML
30 INJECTION, SOLUTION INTRAMUSCULAR; INTRAVENOUS
Status: DISCONTINUED | OUTPATIENT
Start: 2023-09-09 | End: 2023-09-09

## 2023-09-09 RX ADMIN — DEXAMETHASONE SODIUM PHOSPHATE 10 MG: 100 INJECTION INTRAMUSCULAR; INTRAVENOUS at 02:09

## 2023-09-09 NOTE — PATIENT INSTRUCTIONS
Rest     Alternate heat and ice for first 48 hours then  apply heat. You may do gentle stretching if tolerable.    Moist warm compresss to area several times daily.  May use a heating pad on LOW to provide heat over a towel which was dampended with warm water. DO NOT FALL ASLEEP WITH HEATING PAD ON.  Do not stay in one position to long.  When sleeping on your back place a pillow under knees to reduce tension on back.  If sleeping on your side, place pillow between knees to keep spine in better alinement.  Wear supportive shoes such as tennis shoes for support of the lower back.  Take any medication as directed.    If you lose control of your bowel and/or bladder, please go to the nearest Emergency Department immediately.  If you lose sensation in between your legs by your genitalia and/or rectum, please go to the nearest Emergency Department immediately.  If you lose control or sensation of any extremity, please go to the nearest Emergency Department immediately.

## 2023-09-09 NOTE — PROGRESS NOTES
Subjective:      Patient ID: Donal Serrano is a 33 y.o. male.    Vitals:  weight is 88.2 kg (194 lb 7.1 oz). His oral temperature is 98.2 °F (36.8 °C). His blood pressure is 137/95 (abnormal) and his pulse is 90. His respiration is 17 and oxygen saturation is 95%.     Chief Complaint: Back Pain (Entered by patient)    34 y/o male presents to  today with c/o bilateral back pain. Pt states that he was helping someone move today, lifting heavy items. He did not experience any pain in the moment, but later-when he went from getting out of his truck, the pain hit him at once. Pain is significant, with antalgic gait. Bilat back pain radiates to upper buttock on the right side; and to hip on the left side.  He experienced something similar in 2019, in which meloxicam and methocarbamol helped. Other than that, he denies h/o significant back pain or scoliosis.     Back Pain  The current episode started today. The problem occurs constantly. The problem has been gradually worsening since onset. The quality of the pain is described as aching. The pain radiates to the left thigh. The pain is at a severity of 8/10. The pain is mild. The symptoms are aggravated by bending, position and standing. Pertinent negatives include no chest pain, dysuria, fever, headaches, leg pain, numbness or tingling. He has tried NSAIDs for the symptoms. The treatment provided no relief.       Constitution: Negative for fever.   Cardiovascular:  Negative for chest pain.   Genitourinary:  Negative for dysuria.   Musculoskeletal:  Positive for back pain.   Neurological:  Negative for headaches and numbness.      Objective:     Physical Exam   Constitutional: He is oriented to person, place, and time.  Non-toxic appearance. He does not appear ill. No distress.   HENT:   Head: Normocephalic.   Nose: Nose normal.   Mouth/Throat: Mucous membranes are moist.   Cardiovascular: Normal rate.   Pulmonary/Chest: Effort normal.   Abdominal: Normal  appearance.   Musculoskeletal:         General: Tenderness and signs of injury present. No swelling or deformity.      Comments: Antalgic gait noted. No pain to palpation of the lower back.    Neurological: He is alert and oriented to person, place, and time.   Skin: Skin is warm, dry and not diaphoretic.   Psychiatric: His behavior is normal. Mood normal.   Nursing note and vitals reviewed.      Assessment:     1. Acute bilateral low back pain with sciatica, sciatica laterality unspecified        Plan:   Declines toradol due to family h/o toradol allergy.    Acute bilateral low back pain with sciatica, sciatica laterality unspecified  -     methocarbamoL (ROBAXIN) 500 MG Tab; Take 1 tablet (500 mg total) by mouth every 8 (eight) hours as needed (muscle pain/spasms).  Dispense: 30 tablet; Refill: 0  -     dexAMETHasone injection 10 mg  -     meloxicam (MOBIC) 15 MG tablet; Take 1 tablet (15 mg total) by mouth once daily.  Dispense: 30 tablet; Refill: 0    Other orders  -     Discontinue: meloxicam (MOBIC) 15 MG tablet; Take 1 tablet (15 mg total) by mouth once daily.  Dispense: 30 tablet; Refill: 0  -     Discontinue: ketorolac injection 30 mg      Patient Instructions   Rest     Alternate heat and ice for first 48 hours then  apply heat. You may do gentle stretching if tolerable.    Moist warm compresss to area several times daily.  May use a heating pad on LOW to provide heat over a towel which was dampended with warm water. DO NOT FALL ASLEEP WITH HEATING PAD ON.  Do not stay in one position to long.  When sleeping on your back place a pillow under knees to reduce tension on back.  If sleeping on your side, place pillow between knees to keep spine in better alinement.  Wear supportive shoes such as tennis shoes for support of the lower back.  Take any medication as directed.    If you lose control of your bowel and/or bladder, please go to the nearest Emergency Department immediately.  If you lose sensation in  between your legs by your genitalia and/or rectum, please go to the nearest Emergency Department immediately.  If you lose control or sensation of any extremity, please go to the nearest Emergency Department immediately.

## 2023-09-12 ENCOUNTER — TELEPHONE (OUTPATIENT)
Dept: INTERNAL MEDICINE | Facility: CLINIC | Age: 33
End: 2023-09-12
Payer: COMMERCIAL

## 2023-09-12 NOTE — TELEPHONE ENCOUNTER
----- Message from Rocio Hardwick sent at 9/12/2023 10:24 AM CDT -----  Contact: [phone]  Type: Returning a call    Who left a message? Patient Does not Know.     When did the practice call? today    Comments: Try to reschedule but the first available is on 03/2024. Patient does not has a voice mail. He prefer a respond thru the portal.

## 2023-10-26 ENCOUNTER — OFFICE VISIT (OUTPATIENT)
Dept: FAMILY MEDICINE | Facility: CLINIC | Age: 33
End: 2023-10-26
Payer: COMMERCIAL

## 2023-10-26 VITALS — HEIGHT: 67 IN | WEIGHT: 194 LBS | BODY MASS INDEX: 30.45 KG/M2

## 2023-10-26 DIAGNOSIS — R05.1 ACUTE COUGH: ICD-10-CM

## 2023-10-26 DIAGNOSIS — J06.9 VIRAL UPPER RESPIRATORY ILLNESS: Primary | ICD-10-CM

## 2023-10-26 PROCEDURE — 1160F RVW MEDS BY RX/DR IN RCRD: CPT | Mod: CPTII,95,, | Performed by: NURSE PRACTITIONER

## 2023-10-26 PROCEDURE — 4010F PR ACE/ARB THEARPY RXD/TAKEN: ICD-10-PCS | Mod: CPTII,95,, | Performed by: NURSE PRACTITIONER

## 2023-10-26 PROCEDURE — 3044F PR MOST RECENT HEMOGLOBIN A1C LEVEL <7.0%: ICD-10-PCS | Mod: CPTII,95,, | Performed by: NURSE PRACTITIONER

## 2023-10-26 PROCEDURE — 3008F BODY MASS INDEX DOCD: CPT | Mod: CPTII,95,, | Performed by: NURSE PRACTITIONER

## 2023-10-26 PROCEDURE — 1159F MED LIST DOCD IN RCRD: CPT | Mod: CPTII,95,, | Performed by: NURSE PRACTITIONER

## 2023-10-26 PROCEDURE — 99213 OFFICE O/P EST LOW 20 MIN: CPT | Mod: 95,,, | Performed by: NURSE PRACTITIONER

## 2023-10-26 PROCEDURE — 1159F PR MEDICATION LIST DOCUMENTED IN MEDICAL RECORD: ICD-10-PCS | Mod: CPTII,95,, | Performed by: NURSE PRACTITIONER

## 2023-10-26 PROCEDURE — 3044F HG A1C LEVEL LT 7.0%: CPT | Mod: CPTII,95,, | Performed by: NURSE PRACTITIONER

## 2023-10-26 PROCEDURE — 1160F PR REVIEW ALL MEDS BY PRESCRIBER/CLIN PHARMACIST DOCUMENTED: ICD-10-PCS | Mod: CPTII,95,, | Performed by: NURSE PRACTITIONER

## 2023-10-26 PROCEDURE — 3008F PR BODY MASS INDEX (BMI) DOCUMENTED: ICD-10-PCS | Mod: CPTII,95,, | Performed by: NURSE PRACTITIONER

## 2023-10-26 PROCEDURE — 4010F ACE/ARB THERAPY RXD/TAKEN: CPT | Mod: CPTII,95,, | Performed by: NURSE PRACTITIONER

## 2023-10-26 PROCEDURE — 99213 PR OFFICE/OUTPT VISIT, EST, LEVL III, 20-29 MIN: ICD-10-PCS | Mod: 95,,, | Performed by: NURSE PRACTITIONER

## 2023-10-26 RX ORDER — PROMETHAZINE HYDROCHLORIDE AND DEXTROMETHORPHAN HYDROBROMIDE 6.25; 15 MG/5ML; MG/5ML
5 SYRUP ORAL NIGHTLY PRN
Qty: 240 ML | Refills: 0 | Status: SHIPPED | OUTPATIENT
Start: 2023-10-26 | End: 2023-11-05

## 2023-10-26 RX ORDER — BENZONATATE 100 MG/1
100 CAPSULE ORAL 3 TIMES DAILY PRN
Qty: 30 CAPSULE | Refills: 1 | Status: SHIPPED | OUTPATIENT
Start: 2023-10-26 | End: 2023-11-05

## 2023-10-26 NOTE — PROGRESS NOTES
The patient location is: work  The chief complaint leading to consultation is: cough    Visit type: audiovisual    Face to Face time with patient: 15 minutes of total time spent on the encounter, which includes face to face time and non-face to face time preparing to see the patient (eg, review of tests), Obtaining and/or reviewing separately obtained history, Documenting clinical information in the electronic or other health record, Independently interpreting results (not separately reported) and communicating results to the patient/family/caregiver, or Care coordination (not separately reported).     Each patient to whom he or she provides medical services by telemedicine is:  (1) informed of the relationship between the physician and patient and the respective role of any other health care provider with respect to management of the patient; and (2) notified that he or she may decline to receive medical services by telemedicine and may withdraw from such care at any time.    Notes:     Subjective:       Patient ID: Donal Serrano is a 33 y.o. male.    Chief Complaint: Cough  Donal Serrano presents today for Evaluation & management of cough. Last seen in  9/9/2023. This is his first visit with me.     Cough  This is a new problem. The current episode started in the past 7 days. The problem has been gradually worsening. The problem occurs every few minutes. The cough is Productive of sputum. Associated symptoms include headaches, nasal congestion and postnasal drip. Pertinent negatives include no chills, fever, shortness of breath or wheezing. Nothing aggravates the symptoms. He has tried OTC cough suppressant, body position changes, cool air and rest for the symptoms. The treatment provided no relief. There is no history of asthma, bronchiectasis, bronchitis, COPD, emphysema, environmental allergies or pneumonia.     Patient Active Problem List   Diagnosis    Essential hypertension    Elevated  liver enzymes    Heart palpitations    Elevated CPK    Right lower quadrant pain    Tension headache    Migraine without aura and without status migrainosus, not intractable    Bilateral occipital neuralgia    Paresthesias       Current Outpatient Medications:     amitriptyline (ELAVIL) 25 MG tablet, Take 1 tablet (25 mg total) by mouth nightly as needed for Insomnia. (Patient not taking: Reported on 8/2/2023), Disp: 30 tablet, Rfl: 2    benzonatate (TESSALON) 100 MG capsule, Take 1 capsule (100 mg total) by mouth 3 (three) times daily as needed for Cough., Disp: 30 capsule, Rfl: 1    blood pressure monitor ([employer] EASE) ST size, by Other route as needed for High Blood Pressure. (Patient not taking: Reported on 8/2/2023), Disp: 1 each, Rfl: 0    busPIRone (BUSPAR) 7.5 MG tablet, Take 1 tablet (7.5 mg total) by mouth every 6 to 8 hours as needed (anxiety). (Patient not taking: Reported on 9/9/2023), Disp: 30 tablet, Rfl: 2    irbesartan (AVAPRO) 150 MG tablet, Take 1 tablet (150 mg total) by mouth once daily., Disp: 90 tablet, Rfl: 3    meloxicam (MOBIC) 15 MG tablet, Take 1 tablet (15 mg total) by mouth once daily., Disp: 30 tablet, Rfl: 0    methocarbamoL (ROBAXIN) 500 MG Tab, Take 1 tablet (500 mg total) by mouth every 8 (eight) hours as needed (muscle pain/spasms)., Disp: 30 tablet, Rfl: 0    promethazine-dextromethorphan (PROMETHAZINE-DM) 6.25-15 mg/5 mL Syrp, Take 5 mLs by mouth nightly as needed (cough)., Disp: 240 mL, Rfl: 0    sucralfate (CARAFATE) 100 mg/mL suspension, Take 10 mLs (1 g total) by mouth 2 (two) times daily. (Patient not taking: Reported on 9/9/2023), Disp: 414 mL, Rfl: 0    The following portions of the patient's history were reviewed and updated as appropriate: allergies, past family history, past medical history, past social history and past surgical history.    Review of Systems   Constitutional:  Negative for chills, fatigue and fever.   HENT:  Positive for postnasal drip.   "  Respiratory:  Positive for cough. Negative for shortness of breath and wheezing.    Allergic/Immunologic: Negative for environmental allergies.   Neurological:  Positive for headaches.       Objective:      Ht 5' 7" (1.702 m)   Wt 88 kg (194 lb)   BMI 30.38 kg/m²     Physical Exam  Constitutional:       General: He is not in acute distress.     Appearance: Normal appearance.   Pulmonary:      Effort: Pulmonary effort is normal.   Musculoskeletal:         General: Normal range of motion.   Neurological:      Mental Status: He is alert and oriented to person, place, and time.   Psychiatric:         Mood and Affect: Mood normal.         Behavior: Behavior normal.         Assessment:       1. Viral upper respiratory illness    2. Acute cough        Plan:   Donal was seen today for cough.    Diagnoses and all orders for this visit:    Viral upper respiratory illness    Acute cough  -     promethazine-dextromethorphan (PROMETHAZINE-DM) 6.25-15 mg/5 mL Syrp; Take 5 mLs by mouth nightly as needed (cough).  -     benzonatate (TESSALON) 100 MG capsule; Take 1 capsule (100 mg total) by mouth 3 (three) times daily as needed for Cough.      F/U if symptoms do not improve and/or worsen.    "

## 2023-11-17 RX ORDER — BUSPIRONE HYDROCHLORIDE 7.5 MG/1
7.5 TABLET ORAL
Qty: 30 TABLET | Refills: 2 | Status: SHIPPED | OUTPATIENT
Start: 2023-11-17 | End: 2024-11-16

## 2023-11-25 RX ORDER — IRBESARTAN 150 MG/1
150 TABLET ORAL DAILY
Qty: 90 TABLET | Refills: 3 | Status: SHIPPED | OUTPATIENT
Start: 2023-11-25

## 2024-02-11 ENCOUNTER — OFFICE VISIT (OUTPATIENT)
Dept: URGENT CARE | Facility: CLINIC | Age: 34
End: 2024-02-11
Payer: COMMERCIAL

## 2024-02-11 VITALS
DIASTOLIC BLOOD PRESSURE: 99 MMHG | SYSTOLIC BLOOD PRESSURE: 140 MMHG | BODY MASS INDEX: 30.45 KG/M2 | HEIGHT: 67 IN | HEART RATE: 97 BPM | WEIGHT: 194 LBS | TEMPERATURE: 98 F | OXYGEN SATURATION: 97 %

## 2024-02-11 DIAGNOSIS — H66.91 RIGHT ACUTE OTITIS MEDIA: ICD-10-CM

## 2024-02-11 DIAGNOSIS — B96.89 BACTERIAL SINUSITIS: Primary | ICD-10-CM

## 2024-02-11 DIAGNOSIS — G44.209 TENSION-TYPE HEADACHE, NOT INTRACTABLE, UNSPECIFIED CHRONICITY PATTERN: ICD-10-CM

## 2024-02-11 DIAGNOSIS — J32.9 BACTERIAL SINUSITIS: Primary | ICD-10-CM

## 2024-02-11 PROCEDURE — 96372 THER/PROPH/DIAG INJ SC/IM: CPT | Mod: S$GLB,,, | Performed by: NURSE PRACTITIONER

## 2024-02-11 PROCEDURE — 99213 OFFICE O/P EST LOW 20 MIN: CPT | Mod: 25,S$GLB,, | Performed by: NURSE PRACTITIONER

## 2024-02-11 RX ORDER — CEFDINIR 300 MG/1
300 CAPSULE ORAL 2 TIMES DAILY
Qty: 20 CAPSULE | Refills: 0 | Status: SHIPPED | OUTPATIENT
Start: 2024-02-11 | End: 2024-02-21

## 2024-02-11 RX ORDER — IPRATROPIUM BROMIDE 21 UG/1
2 SPRAY, METERED NASAL 3 TIMES DAILY
Qty: 30 ML | Refills: 0 | Status: SHIPPED | OUTPATIENT
Start: 2024-02-11 | End: 2024-02-18

## 2024-02-11 RX ORDER — DEXAMETHASONE SODIUM PHOSPHATE 100 MG/10ML
10 INJECTION INTRAMUSCULAR; INTRAVENOUS
Status: COMPLETED | OUTPATIENT
Start: 2024-02-11 | End: 2024-02-11

## 2024-02-11 RX ORDER — KETOROLAC TROMETHAMINE 30 MG/ML
30 INJECTION, SOLUTION INTRAMUSCULAR; INTRAVENOUS
Status: COMPLETED | OUTPATIENT
Start: 2024-02-11 | End: 2024-02-11

## 2024-02-11 RX ADMIN — KETOROLAC TROMETHAMINE 30 MG: 30 INJECTION, SOLUTION INTRAMUSCULAR; INTRAVENOUS at 01:02

## 2024-02-11 RX ADMIN — DEXAMETHASONE SODIUM PHOSPHATE 10 MG: 100 INJECTION INTRAMUSCULAR; INTRAVENOUS at 01:02

## 2024-02-11 NOTE — PROGRESS NOTES
"Subjective:      Patient ID: Donal Serrano is a 34 y.o. male.    Vitals:  height is 5' 7" (1.702 m) and weight is 88 kg (194 lb). His oral temperature is 98 °F (36.7 °C). His blood pressure is 140/99 (abnormal) and his pulse is 97. His oxygen saturation is 97%.     Chief Complaint: Headache (Entered by patient)    This is a 34 y.o. male who presents today with a chief complaint of right sinus pressure and pain; right ear pain; right jaw pain; and headache. Has h/o migraines-but this feels different. He took tylenol this morning. He states he has been having nasal congestion over the last few days.     Headache   This is a new problem. The current episode started today. The problem occurs constantly. The problem has been unchanged. The pain is located in the Right unilateral region. The pain radiates to the face. The quality of the pain is described as aching and pulsating. The pain is at a severity of 8/10. The pain is severe. Associated symptoms include ear pain and sinus pressure. Pertinent negatives include no abdominal pain, abnormal behavior, anorexia, back pain, blurred vision, coughing, dizziness, drainage, eye pain, eye redness, eye watering, facial sweating, fever, hearing loss, insomnia, loss of balance, muscle aches, nausea, neck pain, numbness, phonophobia, photophobia, rhinorrhea, scalp tenderness, seizures, sore throat, swollen glands, tingling, tinnitus, visual change, vomiting, weakness or weight loss. The symptoms are aggravated by chewing. He has tried acetaminophen for the symptoms. The treatment provided mild relief. His past medical history is significant for hypertension and migraine headaches. There is no history of cancer, cluster headaches, immunosuppression, migraines in the family, obesity, pseudotumor cerebri, recent head traumas, sinus disease or TMJ.       Constitution: Negative for fever.   HENT:  Positive for ear pain, sinus pain and sinus pressure. Negative for tinnitus, " hearing loss, sore throat and trouble swallowing.    Neck: Negative for neck pain.   Eyes:  Negative for eye pain, eye redness, photophobia and blurred vision.   Respiratory:  Negative for cough.    Gastrointestinal:  Negative for abdominal pain, nausea and vomiting.   Musculoskeletal:  Negative for back pain.   Neurological:  Positive for headaches and history of migraines. Negative for dizziness, loss of balance, numbness and seizures.   Psychiatric/Behavioral:  The patient does not have insomnia.       Objective:     Physical Exam   Constitutional: He is oriented to person, place, and time. He appears well-developed. He is cooperative.  Non-toxic appearance. He does not appear ill. No distress.   HENT:   Head: Normocephalic.   Ears:   Right Ear: Hearing, external ear and ear canal normal. impacted cerumen  Left Ear: Hearing, tympanic membrane, external ear and ear canal normal.      Comments: Right ear cleared of cerumen, then tympanic membrane noted to be erythematous and bulging. No mastoiditis.   Nose: Congestion present. No mucosal edema, rhinorrhea or nasal deformity. No epistaxis. Right sinus exhibits no maxillary sinus tenderness and no frontal sinus tenderness. Left sinus exhibits no maxillary sinus tenderness and no frontal sinus tenderness.   Mouth/Throat: Uvula is midline, oropharynx is clear and moist and mucous membranes are normal. Mucous membranes are moist. No trismus in the jaw. Normal dentition. No uvula swelling. No oropharyngeal exudate, posterior oropharyngeal edema or posterior oropharyngeal erythema. Oropharynx is clear.      Comments: Pt can open and close mouth well, with no difficulty.   Eyes: Lids are normal. No scleral icterus.   Neck: Trachea normal and phonation normal. Neck supple. No edema present. No erythema present. No neck rigidity present.   Cardiovascular: Normal rate and regular rhythm.   Pulmonary/Chest: Effort normal and breath sounds normal. No respiratory distress. He  has no decreased breath sounds. He has no rhonchi.   Abdominal: Normal appearance.   Musculoskeletal: Normal range of motion.         General: No deformity. Normal range of motion.      Cervical back: He exhibits no tenderness.   Lymphadenopathy:     He has no cervical adenopathy.   Neurological: He is alert and oriented to person, place, and time. He exhibits normal muscle tone. Coordination normal.   Skin: Skin is warm, dry, intact, not diaphoretic and not pale.   Psychiatric: His speech is normal and behavior is normal. Judgment and thought content normal.   Nursing note and vitals reviewed.      Assessment:     1. Bacterial sinusitis    2. Right acute otitis media    3. Tension-type headache, not intractable, unspecified chronicity pattern        Plan:       Bacterial sinusitis  Comments:  right sided  Orders:  -     cefdinir (OMNICEF) 300 MG capsule; Take 1 capsule (300 mg total) by mouth 2 (two) times daily. for 10 days  Dispense: 20 capsule; Refill: 0  -     ipratropium (ATROVENT) 21 mcg (0.03 %) nasal spray; 2 sprays by Each Nostril route 3 (three) times daily. for 7 days  Dispense: 30 mL; Refill: 0    Right acute otitis media  -     cefdinir (OMNICEF) 300 MG capsule; Take 1 capsule (300 mg total) by mouth 2 (two) times daily. for 10 days  Dispense: 20 capsule; Refill: 0  -     ipratropium (ATROVENT) 21 mcg (0.03 %) nasal spray; 2 sprays by Each Nostril route 3 (three) times daily. for 7 days  Dispense: 30 mL; Refill: 0    Tension-type headache, not intractable, unspecified chronicity pattern  -     dexAMETHasone injection 10 mg  -     ketorolac injection 30 mg      Patient Instructions   Alternate tylenol and ibuprofen at home for pain relief  Oral fluids-hot tea with honey  Rest  Hot, steamy showers  Blow nose often

## 2024-02-11 NOTE — PATIENT INSTRUCTIONS
Alternate tylenol and ibuprofen at home for pain relief  Oral fluids-hot tea with honey  Rest  Hot, steamy showers  Blow nose often

## 2024-02-28 ENCOUNTER — TELEPHONE (OUTPATIENT)
Dept: OTOLARYNGOLOGY | Facility: CLINIC | Age: 34
End: 2024-02-28
Payer: COMMERCIAL

## 2024-02-28 NOTE — TELEPHONE ENCOUNTER
Spoke with patient and informed him that [facility] does not see adults and only sees pediatric children we cancelled appointment and he will reschedule with an adult ENT. He agreed verbally.

## 2024-02-28 NOTE — TELEPHONE ENCOUNTER
Left message to call back about appointment and left message on portal DO NOT SEE ADULT PATIENTS.

## 2024-03-28 ENCOUNTER — PATIENT MESSAGE (OUTPATIENT)
Dept: OPTOMETRY | Facility: CLINIC | Age: 34
End: 2024-03-28
Payer: COMMERCIAL

## 2024-04-24 NOTE — PROGRESS NOTES
Subjective:       Patient ID: Donal Serrano is a 28 y.o. male.    Chief Complaint: get established (pressure been up lately)    HPI   28 y.o. Male here for annual exam.     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  Past Surgical History:  No date: CHOLECYSTECTOMY  Social History    Marital status: Single              Spouse name:                       Years of education:                 Number of children:               Occupational History  Occupation          Employer            Comment                                                     Social History Main Topics    Smoking status: Never Smoker                                                                Smokeless tobacco: Never Used                        Alcohol use: No              Drug use: No              Sexual activity: Not Currently        Review of patient's allergies indicates:  No Known Allergies  Mr. Serrano had no medications administered during this visit.    Review of Systems   Constitutional: Negative for activity change, appetite change, chills, diaphoresis, fatigue, fever and unexpected weight change.   HENT: Positive for postnasal drip and rhinorrhea. Negative for congestion, mouth sores, sinus pressure, sneezing, sore throat, trouble swallowing and voice change.    Eyes: Negative for discharge, itching and visual disturbance.   Respiratory: Positive for cough. Negative for chest tightness, shortness of breath and wheezing.    Cardiovascular: Negative for chest pain, palpitations and leg swelling.   Gastrointestinal: Negative for abdominal pain, blood in stool, constipation, diarrhea, nausea and vomiting.   Endocrine: Negative for cold intolerance and heat intolerance.   Genitourinary: Negative for difficulty urinating, dysuria, flank pain, hematuria and urgency.    Musculoskeletal: Negative for arthralgias, back pain, myalgias and neck pain.   Skin: Negative for rash and wound.   Allergic/Immunologic: Negative for environmental allergies and food allergies.   Neurological: Negative for dizziness, tremors, seizures, syncope, weakness and headaches.   Hematological: Negative for adenopathy. Does not bruise/bleed easily.   Psychiatric/Behavioral: Negative for confusion, sleep disturbance and suicidal ideas. The patient is not nervous/anxious.        Objective:      Physical Exam   Constitutional: He is oriented to person, place, and time. He appears well-developed and well-nourished. No distress.   HENT:   Head: Normocephalic and atraumatic.   Right Ear: External ear normal.   Left Ear: External ear normal.   Nose: Mucosal edema and rhinorrhea present.   Mouth/Throat: Oropharynx is clear and moist. No oropharyngeal exudate.   Eyes: Conjunctivae and EOM are normal. Pupils are equal, round, and reactive to light. Right eye exhibits no discharge. Left eye exhibits no discharge. No scleral icterus.   Neck: Normal range of motion. Neck supple. No JVD present. No thyromegaly present.   Cardiovascular: Normal rate, regular rhythm, normal heart sounds and intact distal pulses.    No murmur heard.  Pulmonary/Chest: Effort normal and breath sounds normal. No respiratory distress. He has no wheezes. He has no rales.   Abdominal: Soft. Bowel sounds are normal. He exhibits no distension. There is no tenderness. There is no guarding.   Musculoskeletal: He exhibits no edema.   Lymphadenopathy:     He has no cervical adenopathy.   Neurological: He is alert and oriented to person, place, and time. No cranial nerve deficit. Coordination normal.   Skin: Skin is warm and dry. No rash noted. He is not diaphoretic. No pallor.   Psychiatric: He has a normal mood and affect. Judgment normal.       Assessment:       1. Annual physical exam    2. Cough    3. Allergic sinusitis        Plan:    1.  Complete blood work, UA       Vaccines: Influenza (declined); Tetanus (declined)       Eye exam: declined   2. Rx Tessalon Perles PRN   3. Rx Flonase/Xyzal    4. F/u in 1 yr      4 = No assist / stand by assistance

## 2024-05-14 ENCOUNTER — OFFICE VISIT (OUTPATIENT)
Dept: INTERNAL MEDICINE | Facility: CLINIC | Age: 34
End: 2024-05-14
Payer: COMMERCIAL

## 2024-05-14 VITALS
SYSTOLIC BLOOD PRESSURE: 152 MMHG | WEIGHT: 199.06 LBS | DIASTOLIC BLOOD PRESSURE: 110 MMHG | HEART RATE: 85 BPM | OXYGEN SATURATION: 99 % | BODY MASS INDEX: 31.24 KG/M2 | HEIGHT: 67 IN | RESPIRATION RATE: 18 BRPM | TEMPERATURE: 97 F

## 2024-05-14 DIAGNOSIS — E66.9 CLASS 1 OBESITY WITH SERIOUS COMORBIDITY AND BODY MASS INDEX (BMI) OF 31.0 TO 31.9 IN ADULT, UNSPECIFIED OBESITY TYPE: ICD-10-CM

## 2024-05-14 DIAGNOSIS — I10 ESSENTIAL HYPERTENSION: ICD-10-CM

## 2024-05-14 DIAGNOSIS — Z00.00 ENCOUNTER FOR ANNUAL HEALTH EXAMINATION: Primary | ICD-10-CM

## 2024-05-14 PROCEDURE — 99395 PREV VISIT EST AGE 18-39: CPT | Mod: S$GLB,,, | Performed by: NURSE PRACTITIONER

## 2024-05-14 PROCEDURE — 3008F BODY MASS INDEX DOCD: CPT | Mod: CPTII,S$GLB,, | Performed by: NURSE PRACTITIONER

## 2024-05-14 PROCEDURE — 3080F DIAST BP >= 90 MM HG: CPT | Mod: CPTII,S$GLB,, | Performed by: NURSE PRACTITIONER

## 2024-05-14 PROCEDURE — 99999 PR PBB SHADOW E&M-EST. PATIENT-LVL IV: CPT | Mod: PBBFAC,,, | Performed by: NURSE PRACTITIONER

## 2024-05-14 PROCEDURE — 4010F ACE/ARB THERAPY RXD/TAKEN: CPT | Mod: CPTII,S$GLB,, | Performed by: NURSE PRACTITIONER

## 2024-05-14 PROCEDURE — 1160F RVW MEDS BY RX/DR IN RCRD: CPT | Mod: CPTII,S$GLB,, | Performed by: NURSE PRACTITIONER

## 2024-05-14 PROCEDURE — 1159F MED LIST DOCD IN RCRD: CPT | Mod: CPTII,S$GLB,, | Performed by: NURSE PRACTITIONER

## 2024-05-14 PROCEDURE — 3077F SYST BP >= 140 MM HG: CPT | Mod: CPTII,S$GLB,, | Performed by: NURSE PRACTITIONER

## 2024-05-14 RX ORDER — IRBESARTAN 300 MG/1
300 TABLET ORAL DAILY
Qty: 90 TABLET | Refills: 3 | Status: SHIPPED | OUTPATIENT
Start: 2024-05-14

## 2024-05-14 NOTE — PROGRESS NOTES
Subjective:       Patient ID: Donal Serrano is a 34 y.o. male.    Chief Complaint: Annual Wellness Visit    Donal Serrnao is a 34 y.o. male who presents today for an annual wellness visit.     Patient was having headaches which prompted him to check his blood pressure at home. He notes that the pressure in the AM was usually in the 130/90s and in the /95. He takes his blood pressure medication at night. He does not smoke. Has a positive family history of HTN. He has gained weight in recent years and he does not follow a low salt diet.     Review of patient's allergies indicates:   Allergen Reactions    Amoxicillin      Advised by Hepatology to avoid 2/2 to concerns for autoimmune rxn    Zithromax [azithromycin]      Advised by Hepatology to avoid 2/2 to concerns for autoimmune rxn      Medication List with Changes/Refills   Current Medications    BLOOD PRESSURE MONITOR ([employer] EASE) ST SIZE    by Other route as needed for High Blood Pressure.    BUSPIRONE (BUSPAR) 7.5 MG TABLET    Take 1 tablet (7.5 mg total) by mouth every 6 to 8 hours as needed (anxiety).   Changed and/or Refilled Medications    Modified Medication Previous Medication    IRBESARTAN (AVAPRO) 300 MG TABLET irbesartan (AVAPRO) 150 MG tablet       Take 1 tablet (300 mg total) by mouth once daily.    Take 1 tablet (150 mg total) by mouth once daily.   Discontinued Medications    AMITRIPTYLINE (ELAVIL) 25 MG TABLET    Take 1 tablet (25 mg total) by mouth nightly as needed for Insomnia.    MELOXICAM (MOBIC) 15 MG TABLET    Take 1 tablet (15 mg total) by mouth once daily.    METHOCARBAMOL (ROBAXIN) 500 MG TAB    Take 1 tablet (500 mg total) by mouth every 8 (eight) hours as needed (muscle pain/spasms).    SUCRALFATE (CARAFATE) 100 MG/ML SUSPENSION    Take 10 mLs (1 g total) by mouth 2 (two) times daily.       Health Maintenance    Tetanus/Tdap: 03/21/2016  HIV Screen: 04/05/2022      Patient ambulates on his own without an assistive  "device.     On average, how many days per week do you do moderate to strenuous exercise:  (like a brisk walk or jog; this does not include your job/work)  1-3   On average, how many minutes do you exercise at this level each day? 90  We encourage you to start exercising if you do not already, continue at your current level or increase your level of activity.     Do you eat fruits and vegetable every day?  Often, but not daily    Do you go to the dentist regularly? Yes  When was you last exam:     Do you go to the eye doctor regularly? Yes  When was your last exam:    Have you often been bothered by feeling down, depressed, or hopeless?  Not at all      Review of Systems   Constitutional:  Negative for chills and fever.   Respiratory:  Negative for cough and shortness of breath.    Cardiovascular:  Negative for chest pain.   Neurological:  Positive for headaches. Negative for dizziness.     Objective:     BP (!) 152/110 (BP Location: Right arm, Patient Position: Sitting)   Pulse 85   Temp 97.2 °F (36.2 °C) (Temporal)   Resp 18   Ht 5' 7" (1.702 m)   Wt 90.3 kg (199 lb 1.2 oz)   SpO2 99%   BMI 31.18 kg/m²     Physical Exam  Vitals reviewed.   Constitutional:       Appearance: Normal appearance.   HENT:      Head: Normocephalic and atraumatic.   Cardiovascular:      Rate and Rhythm: Normal rate and regular rhythm.      Heart sounds: Normal heart sounds. No murmur heard.  Pulmonary:      Effort: Pulmonary effort is normal.      Breath sounds: Normal breath sounds. No wheezing.   Skin:     General: Skin is warm and dry.   Neurological:      Mental Status: He is alert and oriented to person, place, and time.       BP Readings from Last 3 Encounters:   05/14/24 (!) 152/110   02/11/24 (!) 140/99   09/09/23 (!) 137/95     Wt Readings from Last 3 Encounters:   05/14/24 90.3 kg (199 lb 1.2 oz)   02/11/24 88 kg (194 lb)   10/26/23 88 kg (194 lb)       I reviewed and independently interpreted the labs and imaging " below:  Last Labs:  Glucose   Date Value Ref Range Status   03/24/2023 84 70 - 110 mg/dL Final   04/05/2022 84 70 - 110 mg/dL Final     BUN   Date Value Ref Range Status   03/24/2023 12 6 - 20 mg/dL Final   04/05/2022 10 6 - 20 mg/dL Final     Creatinine   Date Value Ref Range Status   03/24/2023 1.0 0.5 - 1.4 mg/dL Final   04/05/2022 0.9 0.5 - 1.4 mg/dL Final     Cholesterol   Date Value Ref Range Status   03/24/2023 173 120 - 199 mg/dL Final     Comment:     The National Cholesterol Education Program (NCEP) has set the  following guidelines (reference ranges) for Cholesterol:  Optimal.....................<200 mg/dL  Borderline High.............200-239 mg/dL  High........................> or = 240 mg/dL     04/05/2022 160 120 - 199 mg/dL Final     Comment:     The National Cholesterol Education Program (NCEP) has set the  following guidelines (reference ranges) for Cholesterol:  Optimal.....................<200 mg/dL  Borderline High.............200-239 mg/dL  High........................> or = 240 mg/dL       Hemoglobin A1C   Date Value Ref Range Status   03/24/2023 5.2 4.0 - 5.6 % Final     Comment:     ADA Screening Guidelines:  5.7-6.4%  Consistent with prediabetes  >or=6.5%  Consistent with diabetes    High levels of fetal hemoglobin interfere with the HbA1C  assay. Heterozygous hemoglobin variants (HbS, HgC, etc)do  not significantly interfere with this assay.   However, presence of multiple variants may affect accuracy.     06/07/2011 4.9 4.0 - 6.2 % Final     Hemoglobin   Date Value Ref Range Status   09/08/2023 14.7 14.0 - 18.0 g/dL Final   03/24/2023 14.8 14.0 - 18.0 g/dL Final     Hematocrit   Date Value Ref Range Status   09/08/2023 43.7 40.0 - 54.0 % Final   03/24/2023 45.4 40.0 - 54.0 % Final     Vit D, 25-Hydroxy   Date Value Ref Range Status   04/05/2022 34 30 - 96 ng/mL Final     Comment:     Vitamin D deficiency.........<10 ng/mL                              Vitamin D insufficiency......10-29  ng/mL       Vitamin D sufficiency........> or equal to 30 ng/mL  Vitamin D toxicity............>100 ng/mL     01/15/2021 40 30 - 96 ng/mL Final     Comment:     Vitamin D deficiency.........<10 ng/mL                              Vitamin D insufficiency......10-29 ng/mL       Vitamin D sufficiency........> or equal to 30 ng/mL  Vitamin D toxicity............>100 ng/mL         Assessment and Plan:     1. Encounter for annual health examination    --Nutrition: Discussed the importance of moderate caffeine intake. Adhering to a low sodium, low saturated fat/low cholesterol diet.   --Exercise: Discussed the importance of regular exercise. At least 30 minutes 5 days per week.   --Immunizations reviewed.  --Discussed benefits of maintaining up to date health maintenance.    - CBC Auto Differential; Future  - Comprehensive Metabolic Panel; Future  - Lipid Panel; Future  - TSH; Future    2. Essential hypertension    Chronic, elevated, increase current medication, RTC in 1 week for BP check      - Ambulatory referral/consult to Bariatric/Obesity Medicine; Future  - CBC Auto Differential; Future  - Comprehensive Metabolic Panel; Future  - Lipid Panel; Future  - TSH; Future  - irbesartan (AVAPRO) 300 MG tablet; Take 1 tablet (300 mg total) by mouth once daily.  Dispense: 90 tablet; Refill: 3    3. Class 1 obesity with serious comorbidity and body mass index (BMI) of 31.0 to 31.9 in adult, unspecified obesity type    Chronic, stable    - Ambulatory referral/consult to Bariatric/Obesity Medicine; Future

## 2024-05-17 ENCOUNTER — TELEPHONE (OUTPATIENT)
Dept: INTERNAL MEDICINE | Facility: CLINIC | Age: 34
End: 2024-05-17
Payer: COMMERCIAL

## 2024-05-17 DIAGNOSIS — I10 ESSENTIAL HYPERTENSION: ICD-10-CM

## 2024-05-17 DIAGNOSIS — Z00.00 ENCOUNTER FOR ANNUAL HEALTH EXAMINATION: Primary | ICD-10-CM

## 2024-05-17 NOTE — TELEPHONE ENCOUNTER
Pt states went to [place] to do labs and labels weren't placed on collection tubes so he is requesting to redo labs for next Friday at [facility].    Lab orders placed and scheduled
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
## 2024-05-27 ENCOUNTER — PATIENT OUTREACH (OUTPATIENT)
Dept: ADMINISTRATIVE | Facility: HOSPITAL | Age: 34
End: 2024-05-27
Payer: COMMERCIAL

## 2024-06-24 ENCOUNTER — TELEPHONE (OUTPATIENT)
Dept: PULMONOLOGY | Facility: CLINIC | Age: 34
End: 2024-06-24
Payer: COMMERCIAL

## 2024-06-24 DIAGNOSIS — R06.02 SOB (SHORTNESS OF BREATH): Primary | ICD-10-CM

## 2024-07-03 ENCOUNTER — HOSPITAL ENCOUNTER (EMERGENCY)
Facility: HOSPITAL | Age: 34
Discharge: HOME | End: 2024-07-03
Attending: EMERGENCY MEDICINE
Payer: COMMERCIAL

## 2024-07-03 VITALS
SYSTOLIC BLOOD PRESSURE: 142 MMHG | OXYGEN SATURATION: 99 % | RESPIRATION RATE: 18 BRPM | WEIGHT: 185 LBS | TEMPERATURE: 98 F | BODY MASS INDEX: 28.98 KG/M2 | HEART RATE: 94 BPM | DIASTOLIC BLOOD PRESSURE: 91 MMHG

## 2024-07-03 DIAGNOSIS — R07.9 CHEST PAIN: Primary | ICD-10-CM

## 2024-07-03 LAB
ALBUMIN SERPL BCP-MCNC: 3.9 G/DL (ref 3.5–5.2)
ALP SERPL-CCNC: 78 U/L (ref 55–135)
ALT SERPL W/O P-5'-P-CCNC: 40 U/L (ref 10–44)
ANION GAP SERPL CALC-SCNC: 9 MMOL/L (ref 8–16)
AST SERPL-CCNC: 35 U/L (ref 10–40)
BASOPHILS # BLD AUTO: 0.03 K/UL (ref 0–0.2)
BASOPHILS NFR BLD: 0.4 % (ref 0–1.9)
BILIRUB SERPL-MCNC: 0.4 MG/DL (ref 0.1–1)
BNP SERPL-MCNC: <10 PG/ML (ref 0–99)
BUN SERPL-MCNC: 12 MG/DL (ref 6–20)
CALCIUM SERPL-MCNC: 9.1 MG/DL (ref 8.7–10.5)
CHLORIDE SERPL-SCNC: 107 MMOL/L (ref 95–110)
CO2 SERPL-SCNC: 20 MMOL/L (ref 23–29)
CREAT SERPL-MCNC: 1 MG/DL (ref 0.5–1.4)
D DIMER PPP IA.FEU-MCNC: 0.28 MG/L FEU
DIFFERENTIAL METHOD BLD: ABNORMAL
EOSINOPHIL # BLD AUTO: 0.4 K/UL (ref 0–0.5)
EOSINOPHIL NFR BLD: 4.5 % (ref 0–8)
ERYTHROCYTE [DISTWIDTH] IN BLOOD BY AUTOMATED COUNT: 11.9 % (ref 11.5–14.5)
EST. GFR  (NO RACE VARIABLE): >60 ML/MIN/1.73 M^2
GLUCOSE SERPL-MCNC: 127 MG/DL (ref 70–110)
HCT VFR BLD AUTO: 42.9 % (ref 40–54)
HGB BLD-MCNC: 14.9 G/DL (ref 14–18)
IMM GRANULOCYTES # BLD AUTO: 0.07 K/UL (ref 0–0.04)
IMM GRANULOCYTES NFR BLD AUTO: 0.8 % (ref 0–0.5)
LIPASE SERPL-CCNC: 20 U/L (ref 4–60)
LYMPHOCYTES # BLD AUTO: 1.5 K/UL (ref 1–4.8)
LYMPHOCYTES NFR BLD: 17.3 % (ref 18–48)
MCH RBC QN AUTO: 29.2 PG (ref 27–31)
MCHC RBC AUTO-ENTMCNC: 34.7 G/DL (ref 32–36)
MCV RBC AUTO: 84 FL (ref 82–98)
MONOCYTES # BLD AUTO: 0.5 K/UL (ref 0.3–1)
MONOCYTES NFR BLD: 5.9 % (ref 4–15)
NEUTROPHILS # BLD AUTO: 6 K/UL (ref 1.8–7.7)
NEUTROPHILS NFR BLD: 71.1 % (ref 38–73)
NRBC BLD-RTO: 0 /100 WBC
OHS QRS DURATION: 86 MS
OHS QRS DURATION: 88 MS
OHS QTC CALCULATION: 457 MS
OHS QTC CALCULATION: 462 MS
PLATELET # BLD AUTO: 199 K/UL (ref 150–450)
PMV BLD AUTO: 9.4 FL (ref 9.2–12.9)
POTASSIUM SERPL-SCNC: 3.4 MMOL/L (ref 3.5–5.1)
PROT SERPL-MCNC: 7.3 G/DL (ref 6–8.4)
RBC # BLD AUTO: 5.11 M/UL (ref 4.6–6.2)
SODIUM SERPL-SCNC: 136 MMOL/L (ref 136–145)
TROPONIN I SERPL DL<=0.01 NG/ML-MCNC: <0.006 NG/ML (ref 0–0.03)
TROPONIN I SERPL DL<=0.01 NG/ML-MCNC: <0.006 NG/ML (ref 0–0.03)
WBC # BLD AUTO: 8.46 K/UL (ref 3.9–12.7)

## 2024-07-03 PROCEDURE — 99285 EMERGENCY DEPT VISIT HI MDM: CPT | Mod: 25

## 2024-07-03 PROCEDURE — 85025 COMPLETE CBC W/AUTO DIFF WBC: CPT

## 2024-07-03 PROCEDURE — 96374 THER/PROPH/DIAG INJ IV PUSH: CPT

## 2024-07-03 PROCEDURE — 93005 ELECTROCARDIOGRAM TRACING: CPT

## 2024-07-03 PROCEDURE — 93010 ELECTROCARDIOGRAM REPORT: CPT | Mod: ,,, | Performed by: INTERNAL MEDICINE

## 2024-07-03 PROCEDURE — 83880 ASSAY OF NATRIURETIC PEPTIDE: CPT

## 2024-07-03 PROCEDURE — 80053 COMPREHEN METABOLIC PANEL: CPT

## 2024-07-03 PROCEDURE — 83690 ASSAY OF LIPASE: CPT

## 2024-07-03 PROCEDURE — 84484 ASSAY OF TROPONIN QUANT: CPT | Mod: 91

## 2024-07-03 PROCEDURE — 63600175 PHARM REV CODE 636 W HCPCS

## 2024-07-03 PROCEDURE — 25000003 PHARM REV CODE 250

## 2024-07-03 PROCEDURE — 85379 FIBRIN DEGRADATION QUANT: CPT

## 2024-07-03 RX ORDER — LIDOCAINE HYDROCHLORIDE 20 MG/ML
15 SOLUTION OROPHARYNGEAL ONCE
Status: COMPLETED | OUTPATIENT
Start: 2024-07-03 | End: 2024-07-03

## 2024-07-03 RX ORDER — ALUMINUM HYDROXIDE, MAGNESIUM HYDROXIDE, AND SIMETHICONE 1200; 120; 1200 MG/30ML; MG/30ML; MG/30ML
30 SUSPENSION ORAL ONCE
Status: COMPLETED | OUTPATIENT
Start: 2024-07-03 | End: 2024-07-03

## 2024-07-03 RX ORDER — ONDANSETRON HYDROCHLORIDE 2 MG/ML
4 INJECTION, SOLUTION INTRAVENOUS
Status: COMPLETED | OUTPATIENT
Start: 2024-07-03 | End: 2024-07-03

## 2024-07-03 RX ADMIN — ONDANSETRON 4 MG: 2 INJECTION INTRAMUSCULAR; INTRAVENOUS at 03:07

## 2024-07-03 RX ADMIN — LIDOCAINE HYDROCHLORIDE 15 ML: 20 SOLUTION ORAL at 03:07

## 2024-07-03 RX ADMIN — ALUMINUM HYDROXIDE, MAGNESIUM HYDROXIDE, AND SIMETHICONE 30 ML: 1200; 120; 1200 SUSPENSION ORAL at 03:07

## 2024-07-03 NOTE — ED NOTES
Patient comes into the emergency department by POV with complaints of CP. Patient states that CP started about 20 minutes ago. Pt states CP is in epigastric area, states it feels worse than acid reflux pain. CP radiates to back. Pt also endorses nausea, diarrhea. Patient denies fall./injury, SOB.    LOC: The patient is awake, alert and aware of environment with an appropriate affect, the patient is oriented x 3 and speaking appropriately.   APPEARANCE: Patient appears comfortable and in no acute distress, patient is clean and well groomed.  SKIN: The skin is warm and dry, color consistent with ethnicity, patient has normal skin turgor and moist mucus membranes, skin intact, no breakdown or bruising noted.   MUSCULOSKELETAL: Patient moving all extremities spontaneously, no swelling noted.  RESPIRATORY: Airway is open and patent, respirations are spontaneous, patient has a normal effort and rate, no accessory muscle.  CARDIAC: Pt placed on cardiac monitor. Patient has a normal rate and regular rhythm, no edema noted, capillary refill < 3 seconds. Pt reports epigastric CP that started 20 minutes ago.  GASTRO: Soft and non tender to palpation, no distention noted. Nausea diarrhea reported.  : Pt denies any pain or frequency with urination.  NEURO: Pt opens eyes spontaneously, behavior appropriate to situation, follows commands, facial expression symmetrical, bilateral hand grasp equal and even, purposeful motor response noted, normal sensation in all extremities when touched with a finger.

## 2024-07-03 NOTE — ED PROVIDER NOTES
Encounter Date: 7/3/2024       History     Chief Complaint   Patient presents with    Chest Pain     Pt reports nausea and midsternal chest pain that radiates to back and started at appx 1400. Pt has hx of HTN.      34-year-old male with history of kidney stones and hypertension presents to the ED regarding midsternal chest pain onset 20 minutes prior to arrival.  States the pain started out as sharp but is now dissipating and more of a pressure-like sensation.  Was originally 8/10 and now 5/10.  The pain does radiate straight into his upper back.  Admits to nausea but no vomiting.  He did have 1 episode of diarrhea prior to arrival but no constipation.  No other bowel movements.  He denies shortness of breath, fever, URI symptoms, or other complaints at this time.  He does have history of GERD but states it does not feel similar and more inferior than his typical reflux symptoms.  Of note, patient did start exhibit 6 weeks ago. H/o cholecystectomy.  He has a nonsmoker and denies any alcohol or recreational drug use.    The history is provided by the patient and medical records.     Review of patient's allergies indicates:   Allergen Reactions    Amoxicillin      Advised by Hepatology to avoid 2/2 to concerns for autoimmune rxn    Zithromax [azithromycin]      Advised by Hepatology to avoid 2/2 to concerns for autoimmune rxn     Past Medical History:   Diagnosis Date    Keloid cicatrix     Kidney stones      Past Surgical History:   Procedure Laterality Date    APPENDECTOMY      CHOLECYSTECTOMY  03/2012    LAPAROSCOPIC APPENDECTOMY N/A 8/20/2018    Procedure: APPENDECTOMY, LAPAROSCOPIC;  Surgeon: Saulo Powell MD;  Location: [facility] OR [address] [place] [address];  Service: General;  Laterality: N/A;     Family History   Problem Relation Name Age of Onset    Hypertension Mother      Cancer Father          Liver    Cirrhosis Father      No Known Problems Sister Lelia     No Known Problems Sister Alma     Hypertension Maternal  Grandmother      Skin cancer Maternal Grandfather      Melanoma Maternal Grandfather      No Known Problems Paternal Grandmother      Skin cancer Paternal Grandfather      Liver disease Paternal Grandfather      Heart disease Maternal Uncle      Cleft lip Daughter Shirley         s/p surgery,  4/15/2021    Stroke Neg Hx       Social History     Tobacco Use    Smoking status: Never    Smokeless tobacco: Never   Substance Use Topics    Alcohol use: No    Drug use: No     Review of Systems  See HPI  Physical Exam     Initial Vitals [24 1411]   BP Pulse Resp Temp SpO2   (!) 158/95 96 16 97.7 °F (36.5 °C) 97 %      MAP       --         Physical Exam    Vitals reviewed.  Constitutional: He appears well-developed and well-nourished. He is not diaphoretic. No distress.   HENT:   Head: Normocephalic and atraumatic.   Neck: Neck supple.   Cardiovascular:  Normal rate, regular rhythm and normal heart sounds.     Exam reveals no gallop and no friction rub.       No murmur heard.  Pulmonary/Chest: Breath sounds normal. No respiratory distress. He has no wheezes. He has no rhonchi. He has no rales. He exhibits no tenderness.   Abdominal: Abdomen is soft and protuberant. He exhibits no distension. There is abdominal tenderness in the epigastric area.   No skin changes There is no rebound and no guarding.   Musculoskeletal:      Cervical back: Neck supple.     Psychiatric: He has a normal mood and affect.         ED Course   Procedures  Labs Reviewed   CBC W/ AUTO DIFFERENTIAL - Abnormal; Notable for the following components:       Result Value    Immature Granulocytes 0.8 (*)     Immature Grans (Abs) 0.07 (*)     Lymph % 17.3 (*)     All other components within normal limits   COMPREHENSIVE METABOLIC PANEL - Abnormal; Notable for the following components:    Potassium 3.4 (*)     CO2 20 (*)     Glucose 127 (*)     All other components within normal limits   TROPONIN I   B-TYPE NATRIURETIC PEPTIDE   LIPASE   D DIMER,  QUANTITATIVE   TROPONIN I     EKG Readings: (Independently Interpreted)   Initial Reading: No STEMI. Rhythm: Normal Sinus Rhythm. Heart Rate: 97. Ectopy: No Ectopy. Conduction: Normal. ST Segments: Normal ST Segments. T Waves: Normal.     ECG Results              EKG 12-lead (Final result)        Collection Time Result Time QRS Duration OHS QTC Calculation    07/03/24 14:48:28 07/03/24 14:54:51 86 462                     Final result by Interface, Lab In [facility] (07/03/24 14:54:55)                   Narrative:    Test Reason : R07.9,    Vent. Rate : 106 BPM     Atrial Rate : 106 BPM     P-R Int : 180 ms          QRS Dur : 086 ms      QT Int : 348 ms       P-R-T Axes : 030 043 021 degrees     QTc Int : 462 ms    Sinus tachycardia  Otherwise normal ECG  When compared with ECG of 03-JUL-2024 14:13,  No significant change was found  Confirmed by Geoffrey MCRAE MD ([id number]) on 7/3/2024 2:54:48 PM    Referred By: [email or web address]   SELF           Confirmed By:Geoffrey MCRAE MD                                     EKG 12-lead (Final result)        Collection Time Result Time QRS Duration OHS QTC Calculation    07/03/24 14:13:34 07/03/24 14:20:07 88 457                     Final result by Interface, Lab In [facility] (07/03/24 14:20:16)                   Narrative:    Test Reason : R07.9,    Vent. Rate : 097 BPM     Atrial Rate : 097 BPM     P-R Int : 180 ms          QRS Dur : 088 ms      QT Int : 360 ms       P-R-T Axes : 033 047 016 degrees     QTc Int : 457 ms    Normal sinus rhythm  Normal ECG  When compared with ECG of 02-AUG-2023 13:50,  No significant change was found  Confirmed by SILVIANO RODRIGUEZ MD ([id number]) on 7/3/2024 2:20:04 PM    Referred By:             Confirmed By:SILVIANO RODRIGUEZ MD                                  Imaging Results              X-Ray Chest PA And Lateral (Final result)  Result time 07/03/24 15:33:36      Final result by Jose G Garcia MD (07/03/24 15:33:36)                   Impression:      No acute  process.      Electronically signed by: Jose G Garcia MD  Date:    07/03/2024  Time:    15:33               Narrative:    EXAMINATION:  XR CHEST PA AND LATERAL    CLINICAL HISTORY:  Chest Pain;    TECHNIQUE:  PA and lateral views of the chest were performed.    COMPARISON:  None    FINDINGS:  The trachea is unremarkable.  The cardiomediastinal silhouette is within normal limits.  The hilar structures are unremarkable.  There is no evidence of free air beneath hemidiaphragms.  There are no pleural effusions.  There is no evidence of a pneumothorax.  There is no evidence of pneumomediastinum.  No airspace opacity is present.  The osseous structures are unremarkable.                                       Medications   ondansetron injection 4 mg (4 mg Intravenous Given 7/3/24 1526)   aluminum-magnesium hydroxide-simethicone 200-200-20 mg/5 mL suspension 30 mL (30 mLs Oral Given 7/3/24 1526)     And   LIDOcaine viscous HCl 2% oral solution 15 mL (15 mLs Oral Given 7/3/24 1526)     Medical Decision Making  Amount and/or Complexity of Data Reviewed  Labs: ordered. Decision-making details documented in ED Course.  Radiology: ordered. Decision-making details documented in ED Course.    Risk  OTC drugs.  Prescription drug management.         APC / Resident Notes:   Emergent evaluation of 34-year-old male presenting with midsternal chest pain that radiates into his back onset 20 minutes prior to arrival.  Hypertensive with other vitals stable.  Clinically well-appearing, in no acute distress.  See physical exam findings above.  Her suspicion for GI etiology such as gastritis or acute pancreatitis. Will assess for ACS with 2 troponins. EKG unremarkable. Low suspicion for dissection though given pain radiating to the back will D-dimer. Will get GI cocktail and reassess.    My differential diagnoses include but are not limited to:   Gastritis, acute pancreatitis, GERD, PUD, ACS, pericarditis, aortic dissection, electrolyte  abnormality, JULIA, ozempic side effect    See ED course.  I have reviewed the patient's records and discussed with my supervising physician.        ED Course as of 07/03/24 5510   Wed Jul 03, 2024   1536 CBC auto differential(!)  No leukocytosis or anemia [KB]   1537 Comprehensive metabolic panel(!)  Grossly unremarkable.  Mild hypokalemia with other electrolytes WNL.  No JULIA. [KB]   1537 Lipase: 20 [KB]   1537 Troponin I: <0.006 [KB]   1537 BNP: <10 [KB]   1537 D-Dimer: 0.28 [KB]   1537 X-Ray Chest PA And Lateral  Cardiac silhouette within normal limits. No consolidation, pleural effusion, or pneumothorax noted. No bony abnormalities. [KB]   1848 Troponin I: <0.006 [KB]   1848 Patient educated on findings. Reports symptoms resolved with GI cocktail. Likely gastritis. Advised to follow up with PCP and strict ED precautions given with all questions answered. He verbalized understanding and agreed to plan. Vitals are stable and safe for discharge.  [KB]      ED Course User Index  [KB] Tereza Ruiz PA-C                           Clinical Impression:  Final diagnoses:  [R07.9] Chest pain (Primary)          ED Disposition Condition    Discharge Stable          ED Prescriptions    None       Follow-up Information       Follow up With Specialties Details Why Contact Info    Kimi Valladares MD Internal Medicine Schedule an appointment as soon as possible for a visit   [address] [facility] [address]  [phone]      [facility] - Emergency Dept Emergency Medicine Go to  If symptoms worsen [address]  [facility] [id number] [phone]             Tereza Ruiz PA-C  07/03/24 1240

## 2024-07-12 ENCOUNTER — HOSPITAL ENCOUNTER (EMERGENCY)
Facility: HOSPITAL | Age: 34
Discharge: HOME | End: 2024-07-13
Attending: EMERGENCY MEDICINE
Payer: COMMERCIAL

## 2024-07-12 ENCOUNTER — PATIENT MESSAGE (OUTPATIENT)
Dept: INTERNAL MEDICINE | Facility: CLINIC | Age: 34
End: 2024-07-12
Payer: COMMERCIAL

## 2024-07-12 DIAGNOSIS — K52.9 GASTROENTERITIS: Primary | ICD-10-CM

## 2024-07-12 PROCEDURE — 99284 EMERGENCY DEPT VISIT MOD MDM: CPT | Mod: 25

## 2024-07-12 RX ORDER — PROCHLORPERAZINE EDISYLATE 5 MG/ML
5 INJECTION INTRAMUSCULAR; INTRAVENOUS ONCE
Status: COMPLETED | OUTPATIENT
Start: 2024-07-13 | End: 2024-07-13

## 2024-07-12 RX ORDER — DICYCLOMINE HYDROCHLORIDE 10 MG/1
20 CAPSULE ORAL
Status: COMPLETED | OUTPATIENT
Start: 2024-07-13 | End: 2024-07-13

## 2024-07-12 RX ORDER — ALUMINUM HYDROXIDE, MAGNESIUM HYDROXIDE, AND SIMETHICONE 1200; 120; 1200 MG/30ML; MG/30ML; MG/30ML
15 SUSPENSION ORAL
Status: COMPLETED | OUTPATIENT
Start: 2024-07-13 | End: 2024-07-13

## 2024-07-12 RX ORDER — LOPERAMIDE HYDROCHLORIDE 2 MG/1
4 CAPSULE ORAL
Status: COMPLETED | OUTPATIENT
Start: 2024-07-13 | End: 2024-07-13

## 2024-07-13 VITALS
OXYGEN SATURATION: 100 % | BODY MASS INDEX: 29.03 KG/M2 | HEIGHT: 67 IN | SYSTOLIC BLOOD PRESSURE: 126 MMHG | HEART RATE: 92 BPM | DIASTOLIC BLOOD PRESSURE: 80 MMHG | TEMPERATURE: 98 F | WEIGHT: 185 LBS | RESPIRATION RATE: 14 BRPM

## 2024-07-13 LAB
ALBUMIN SERPL BCP-MCNC: 4.8 G/DL (ref 3.5–5.2)
ALP SERPL-CCNC: 103 U/L (ref 55–135)
ALT SERPL W/O P-5'-P-CCNC: 73 U/L (ref 10–44)
ANION GAP SERPL CALC-SCNC: 13 MMOL/L (ref 8–16)
AST SERPL-CCNC: 36 U/L (ref 10–40)
BASOPHILS # BLD AUTO: 0.02 K/UL (ref 0–0.2)
BASOPHILS NFR BLD: 0.1 % (ref 0–1.9)
BILIRUB SERPL-MCNC: 1.1 MG/DL (ref 0.1–1)
BUN SERPL-MCNC: 13 MG/DL (ref 6–20)
CALCIUM SERPL-MCNC: 10.2 MG/DL (ref 8.7–10.5)
CHLORIDE SERPL-SCNC: 105 MMOL/L (ref 95–110)
CO2 SERPL-SCNC: 19 MMOL/L (ref 23–29)
CREAT SERPL-MCNC: 1.1 MG/DL (ref 0.5–1.4)
DIFFERENTIAL METHOD BLD: ABNORMAL
EOSINOPHIL # BLD AUTO: 0.4 K/UL (ref 0–0.5)
EOSINOPHIL NFR BLD: 2.9 % (ref 0–8)
ERYTHROCYTE [DISTWIDTH] IN BLOOD BY AUTOMATED COUNT: 11.8 % (ref 11.5–14.5)
EST. GFR  (NO RACE VARIABLE): >60 ML/MIN/1.73 M^2
GLUCOSE SERPL-MCNC: 108 MG/DL (ref 70–110)
HCT VFR BLD AUTO: 47.6 % (ref 40–54)
HGB BLD-MCNC: 17.1 G/DL (ref 14–18)
IMM GRANULOCYTES # BLD AUTO: 0.11 K/UL (ref 0–0.04)
IMM GRANULOCYTES NFR BLD AUTO: 0.8 % (ref 0–0.5)
LIPASE SERPL-CCNC: 20 U/L (ref 4–60)
LYMPHOCYTES # BLD AUTO: 1.5 K/UL (ref 1–4.8)
LYMPHOCYTES NFR BLD: 10.1 % (ref 18–48)
MCH RBC QN AUTO: 29.8 PG (ref 27–31)
MCHC RBC AUTO-ENTMCNC: 35.9 G/DL (ref 32–36)
MCV RBC AUTO: 83 FL (ref 82–98)
MONOCYTES # BLD AUTO: 0.9 K/UL (ref 0.3–1)
MONOCYTES NFR BLD: 6.5 % (ref 4–15)
NEUTROPHILS # BLD AUTO: 11.4 K/UL (ref 1.8–7.7)
NEUTROPHILS NFR BLD: 79.6 % (ref 38–73)
NRBC BLD-RTO: 0 /100 WBC
PLATELET # BLD AUTO: 242 K/UL (ref 150–450)
PMV BLD AUTO: 9 FL (ref 9.2–12.9)
POTASSIUM SERPL-SCNC: 3.6 MMOL/L (ref 3.5–5.1)
PROT SERPL-MCNC: 8.8 G/DL (ref 6–8.4)
RBC # BLD AUTO: 5.73 M/UL (ref 4.6–6.2)
SODIUM SERPL-SCNC: 137 MMOL/L (ref 136–145)
WBC # BLD AUTO: 14.39 K/UL (ref 3.9–12.7)

## 2024-07-13 PROCEDURE — 96361 HYDRATE IV INFUSION ADD-ON: CPT

## 2024-07-13 PROCEDURE — 63600175 PHARM REV CODE 636 W HCPCS: Performed by: EMERGENCY MEDICINE

## 2024-07-13 PROCEDURE — 96374 THER/PROPH/DIAG INJ IV PUSH: CPT

## 2024-07-13 PROCEDURE — 83690 ASSAY OF LIPASE: CPT | Performed by: EMERGENCY MEDICINE

## 2024-07-13 PROCEDURE — 80053 COMPREHEN METABOLIC PANEL: CPT | Performed by: EMERGENCY MEDICINE

## 2024-07-13 PROCEDURE — 85025 COMPLETE CBC W/AUTO DIFF WBC: CPT | Performed by: EMERGENCY MEDICINE

## 2024-07-13 PROCEDURE — 25000003 PHARM REV CODE 250: Performed by: EMERGENCY MEDICINE

## 2024-07-13 RX ORDER — DICYCLOMINE HYDROCHLORIDE 20 MG/1
20 TABLET ORAL 2 TIMES DAILY
Qty: 10 TABLET | Refills: 0 | Status: SHIPPED | OUTPATIENT
Start: 2024-07-13 | End: 2024-07-18

## 2024-07-13 RX ADMIN — ALUMINUM HYDROXIDE, MAGNESIUM HYDROXIDE, AND SIMETHICONE 15 ML: 1200; 120; 1200 SUSPENSION ORAL at 12:07

## 2024-07-13 RX ADMIN — SODIUM CHLORIDE 1000 ML: 9 INJECTION, SOLUTION INTRAVENOUS at 12:07

## 2024-07-13 RX ADMIN — DICYCLOMINE HYDROCHLORIDE 20 MG: 10 CAPSULE ORAL at 12:07

## 2024-07-13 RX ADMIN — LOPERAMIDE HYDROCHLORIDE 4 MG: 2 CAPSULE ORAL at 12:07

## 2024-07-13 RX ADMIN — PROCHLORPERAZINE EDISYLATE 5 MG: 5 INJECTION INTRAMUSCULAR; INTRAVENOUS at 12:07

## 2024-07-13 NOTE — ED PROVIDER NOTES
Encounter Date: 2024       History     Chief Complaint   Patient presents with    Emesis     Vomiting and diarrhea starting today. Reports 6 episodes of emesis and diarrhea. +HA      HPI    34-year-old male presents the ER for evaluation of 1 day of gastroenteritis like symptoms.  Patient reports that he ate steak at a fast food restaurant soon had these symptoms.  Nonbloody nonbilious inability to tolerate p.o. came ER for further evaluation    Review of patient's allergies indicates:   Allergen Reactions    Amoxicillin      Advised by Hepatology to avoid 2/2 to concerns for autoimmune rxn    Zithromax [azithromycin]      Advised by Hepatology to avoid 2/2 to concerns for autoimmune rxn     Past Medical History:   Diagnosis Date    Keloid cicatrix     Kidney stones      Past Surgical History:   Procedure Laterality Date    APPENDECTOMY      CHOLECYSTECTOMY  2012    LAPAROSCOPIC APPENDECTOMY N/A 2018    Procedure: APPENDECTOMY, LAPAROSCOPIC;  Surgeon: Saulo Powell MD;  Location: [facility] OR [address] [place] [address];  Service: General;  Laterality: N/A;     Family History   Problem Relation Name Age of Onset    Hypertension Mother      Cancer Father          Liver    Cirrhosis Father      No Known Problems Sister Lelia     No Known Problems Sister Alma     Hypertension Maternal Grandmother      Skin cancer Maternal Grandfather      Melanoma Maternal Grandfather      No Known Problems Paternal Grandmother      Skin cancer Paternal Grandfather      Liver disease Paternal Grandfather      Heart disease Maternal Uncle      Cleft lip Daughter Shirley         s/p surgery,  4/15/2021    Stroke Neg Hx       Social History     Tobacco Use    Smoking status: Never    Smokeless tobacco: Never   Substance Use Topics    Alcohol use: No    Drug use: No     Review of Systems   Constitutional:  Positive for fatigue.   Gastrointestinal:  Positive for abdominal pain, diarrhea, nausea and vomiting.   All other systems reviewed and  are negative.      Physical Exam     Initial Vitals   BP Pulse Resp Temp SpO2   07/12/24 2343 07/12/24 2343 07/12/24 2343 07/12/24 2344 07/12/24 2343   (!) 157/91 107 14 98 °F (36.7 °C) 98 %      MAP       --                Physical Exam    Nursing note and vitals reviewed.  Constitutional: He appears well-developed and well-nourished.   HENT:   Head: Normocephalic and atraumatic.   Eyes: Pupils are equal, round, and reactive to light.   Neck:   Normal range of motion.  Cardiovascular:  Normal rate, regular rhythm and normal heart sounds.           Pulmonary/Chest: Breath sounds normal. No respiratory distress.   Abdominal: Abdomen is soft.   Generalized abdominal tenderness no guarding rebound or lateralization   Musculoskeletal:         General: Normal range of motion.      Cervical back: Normal range of motion.     Neurological: He is alert and oriented to person, place, and time. He has normal strength. GCS score is 15. GCS eye subscore is 4. GCS verbal subscore is 5. GCS motor subscore is 6.   Skin: Skin is warm and dry. Capillary refill takes less than 2 seconds.   Psychiatric: He has a normal mood and affect. Thought content normal.         ED Course   Procedures  Labs Reviewed   CBC W/ AUTO DIFFERENTIAL - Abnormal; Notable for the following components:       Result Value    WBC 14.39 (*)     MPV 9.0 (*)     Immature Granulocytes 0.8 (*)     Gran # (ANC) 11.4 (*)     Immature Grans (Abs) 0.11 (*)     Gran % 79.6 (*)     Lymph % 10.1 (*)     All other components within normal limits   COMPREHENSIVE METABOLIC PANEL - Abnormal; Notable for the following components:    CO2 19 (*)     Total Protein 8.8 (*)     Total Bilirubin 1.1 (*)     ALT 73 (*)     All other components within normal limits   LIPASE          Imaging Results    None          Medications   sodium chloride 0.9% bolus 1,000 mL 1,000 mL (0 mLs Intravenous Stopped 7/13/24 0115)   aluminum-magnesium hydroxide-simethicone 200-200-20 mg/5 mL suspension  15 mL (15 mLs Oral Given 7/13/24 0019)   prochlorperazine injection Soln 5 mg (5 mg Intravenous Given 7/13/24 0014)   dicyclomine capsule 20 mg (20 mg Oral Given 7/13/24 0018)   loperamide capsule 4 mg (4 mg Oral Given 7/13/24 0018)     Medical Decision Making  Pleasant 34-year-old male presents the ER for evaluation of likely gastroenteritis like symptoms food-borne illness.  Ate at a restaurant and soon after had multiple episodes of nausea vomiting diarrhea.  He arrived in the ER, well appearing no acute distress hemodynamically stable.  Likely food-borne illness other differential includes JULIA dehydration enteritis.  Will plan blood work symptomatic support reassess , anticipate discharge    Amount and/or Complexity of Data Reviewed  Independent Historian: friend  Labs: ordered. Decision-making details documented in ED Course.    Risk  OTC drugs.  Prescription drug management.               ED Course as of 07/13/24 0603   Sat Jul 13, 2024   0145 CBC auto differential(!) [SE]   0145 Lipase [SE]   0145 Comprehensive metabolic panel(!)  Patient resting comfortably in bed no acute distress tolerating p.o. feels better.  Declined to give urine sample.  Abdomen has remained benign, no significant electrolyte abnormality noted.  Slight elevation in bilirubin but patient was status post cholecystectomy.  Patient was comfortable with discharge return precautions discussed [SE]      ED Course User Index  [SE] Zaina Lange MD                           Clinical Impression:  Final diagnoses:  [K52.9] Gastroenteritis (Primary)          ED Disposition Condition    Discharge Stable          ED Prescriptions       Medication Sig Dispense Start Date End Date Auth. Provider    dicyclomine (BENTYL) 20 mg tablet Take 1 tablet (20 mg total) by mouth 2 (two) times daily. for 5 days 10 tablet 7/13/2024 7/18/2024 Zaina Lange MD          Follow-up Information       Follow up With Specialties Details Why Contact Info     Kimi Valladares MD Internal Medicine Schedule an appointment as soon as possible for a visit  As needed [address] [facility] [address]  [phone]               Zaina Lange MD  07/13/24 0603

## 2024-07-22 RX ORDER — BUSPIRONE HYDROCHLORIDE 7.5 MG/1
7.5 TABLET ORAL
Qty: 30 TABLET | Refills: 2 | Status: SHIPPED | OUTPATIENT
Start: 2024-07-22 | End: 2025-07-22

## 2024-08-03 DIAGNOSIS — I10 ESSENTIAL HYPERTENSION: ICD-10-CM

## 2024-08-05 RX ORDER — IRBESARTAN 300 MG/1
300 TABLET ORAL DAILY
Qty: 90 TABLET | Refills: 3 | Status: SHIPPED | OUTPATIENT
Start: 2024-08-05

## 2024-08-07 ENCOUNTER — PATIENT OUTREACH (OUTPATIENT)
Dept: ADMINISTRATIVE | Facility: HOSPITAL | Age: 34
End: 2024-08-07
Payer: COMMERCIAL

## 2024-08-07 VITALS — DIASTOLIC BLOOD PRESSURE: 80 MMHG | SYSTOLIC BLOOD PRESSURE: 126 MMHG

## 2024-09-02 ENCOUNTER — PATIENT MESSAGE (OUTPATIENT)
Dept: INTERNAL MEDICINE | Facility: CLINIC | Age: 34
End: 2024-09-02
Payer: COMMERCIAL

## 2024-09-06 ENCOUNTER — ON-DEMAND VIRTUAL (OUTPATIENT)
Dept: URGENT CARE | Facility: CLINIC | Age: 34
End: 2024-09-06
Payer: COMMERCIAL

## 2024-09-06 DIAGNOSIS — J02.9 SORE THROAT: ICD-10-CM

## 2024-09-06 DIAGNOSIS — R49.0 HOARSE VOICE QUALITY: ICD-10-CM

## 2024-09-06 DIAGNOSIS — J04.0 LARYNGITIS: Primary | ICD-10-CM

## 2024-09-06 RX ORDER — PREDNISONE 20 MG/1
20 TABLET ORAL DAILY
Qty: 3 TABLET | Refills: 0 | Status: SHIPPED | OUTPATIENT
Start: 2024-09-06 | End: 2024-09-09

## 2024-09-06 NOTE — PROGRESS NOTES
Subjective:      Patient ID: Donal Serrano is a 34 y.o. male.    Vitals:  vitals were not taken for this visit.     Chief Complaint: Hoarse      Visit Type: TELE AUDIOVISUAL    Present with the patient at the time of consultation: TELEMED PRESENT WITH PATIENT: None        Past Medical History:   Diagnosis Date    Keloid cicatrix     Kidney stones      Past Surgical History:   Procedure Laterality Date    APPENDECTOMY      CHOLECYSTECTOMY  2012    LAPAROSCOPIC APPENDECTOMY N/A 2018    Procedure: APPENDECTOMY, LAPAROSCOPIC;  Surgeon: Saulo Powell MD;  Location: [facility] OR [address] [place] [address];  Service: General;  Laterality: N/A;     Review of patient's allergies indicates:   Allergen Reactions    Amoxicillin      Advised by Hepatology to avoid 2/2 to concerns for autoimmune rxn    Zithromax [azithromycin]      Advised by Hepatology to avoid 2/2 to concerns for autoimmune rxn     Current Outpatient Medications on File Prior to Visit   Medication Sig Dispense Refill    blood pressure monitor ([employer] EASE) ST size by Other route as needed for High Blood Pressure. 1 each 0    busPIRone (BUSPAR) 7.5 MG tablet Take 1 tablet (7.5 mg total) by mouth every 6 to 8 hours as needed (anxiety). 30 tablet 2    irbesartan (AVAPRO) 300 MG tablet Take 1 tablet (300 mg total) by mouth once daily. 90 tablet 3     No current facility-administered medications on file prior to visit.     Family History   Problem Relation Name Age of Onset    Hypertension Mother      Cancer Father          Liver    Cirrhosis Father      No Known Problems Sister Lelia     No Known Problems Sister Alma     Hypertension Maternal Grandmother      Skin cancer Maternal Grandfather      Melanoma Maternal Grandfather      No Known Problems Paternal Grandmother      Skin cancer Paternal Grandfather      Liver disease Paternal Grandfather      Heart disease Maternal Uncle      Cleft lip Daughter Shirley         s/p surgery,  4/15/2021    Stroke Neg Hx          Medications Ordered                [id number] - [place] [address] [facility] [address]   [address] [address]    Telephone: [phone]   Fax: [phone]   Hours: Not open 24 hours                         E-Prescribed (1 of 1)              predniSONE (DELTASONE) 20 MG tablet    Sig: Take 1 tablet (20 mg total) by mouth once daily. for 3 days       Start: 9/6/24     Quantity: 3 tablet Refills: 0                           Ohs Peq Odvv Intake    9/6/2024  5:07 PM CDT - Filed by Patient   What is your current physical address in the event of a medical emergency? [address] [facility] [place] [address]   Are you able to take your vital signs? No   Please attach any relevant images or files          35 yo male with c/o cough, sore throat, and hoarse for one day. He denies fever. He denies nasal congestion but positive nasal dranage. He states tried mucinex and steam showers, fluids and tylenol.         Constitution: Negative.   HENT:  Positive for sore throat and voice change.    Cardiovascular: Negative.    Eyes: Negative.    Respiratory:  Positive for cough.    Gastrointestinal: Negative.  Negative for bowel incontinence.   Endocrine: negative.   Genitourinary: Negative.  Negative for dysuria, flank pain, bladder incontinence and pelvic pain.   Musculoskeletal: Negative.  Negative for pain, abnormal ROM of joint and back pain.   Skin: Negative.    Allergic/Immunologic: Negative.    Neurological: Negative.    Hematologic/Lymphatic: Negative.    Psychiatric/Behavioral: Negative.          Objective:   The physical exam was conducted virtually.  LOCATION OF PATIENT home  Physical Exam   Constitutional: He is oriented to person, place, and time. He appears well-developed.   HENT:   Head: Normocephalic and atraumatic.   Ears:   Right Ear: Hearing, tympanic membrane and external ear normal.   Left Ear: Hearing, tympanic membrane and external ear normal.   Nose:  Nose normal.   Mouth/Throat: Uvula is midline, oropharynx is clear and moist and mucous membranes are normal.   Eyes: Conjunctivae and EOM are normal. Pupils are equal, round, and reactive to light.   Neck: Neck supple.   Cardiovascular: Normal rate.   Pulmonary/Chest: Effort normal and breath sounds normal.   Musculoskeletal: Normal range of motion.         General: Normal range of motion.   Neurological: He is alert and oriented to person, place, and time.   Skin: Skin is warm.   Psychiatric: His behavior is normal. Thought content normal.   Nursing note and vitals reviewed.      Assessment:     1. Laryngitis    2. Hoarse voice quality    3. Sore throat        Plan:     -Below are suggestions for symptomatic relief:              -Tylenol every 4 hours OR ibuprofen every 6 hours as needed for pain/fever.              -Salt water gargles to soothe throat pain.              -Chloroseptic spray also helps to numb throat pain.              -Nasal saline spray reduces inflammation and dryness.              -Warm face compresses to help with facial sinus pain/pressure.              -Vicks vapor rub at night.              -Flonase OTC or Nasacort OTC for nasal congestion.              -Simple foods like chicken noodle soup.              -Delsym helps with coughing at night              -Zyrtec/Claritin during the day & Benadryl at night may help with allergies.     If you DO NOT have Hypertension or any history of palpitations, it is ok to take over the counter Sudafed or Mucinex D or Allegra-D or Claritin-D or Zyrtec-D.  If you do take one of the above, it is ok to combine that with plain over the counter Mucinex or Allegra or Claritin or Zyrtec. If, for example, you are taking Zyrtec -D, you can combine that with Mucinex, but not Mucinex-D.  If you are taking Mucinex-D, you can combine that with plain Allegra or Claritin or Zyrtec.   If you DO have Hypertension or palpitations, it is safe to take Coricidin HBP for  relief of sinus symptoms.     Please follow up with your Primary care provider within 2-5 days if your signs and symptoms have not resolved or worsen.      If your condition worsens or fails to improve we recommend that you receive another evaluation at the emergency room immediately or contact your primary medical clinic to discuss your concerns.   You must understand that you have received an Urgent Care treatment only and that you may be released before all of your medical problems are known or treated. You, the patient, will arrange for follow up care as instructed.      RED FLAGS/WARNING SYMPTOMS DISCUSSED WITH PATIENT THAT WOULD WARRANT EMERGENT MEDICAL ATTENTION. PATIENT VERBALIZED UNDERSTANDING.     Laryngitis  -     predniSONE (DELTASONE) 20 MG tablet; Take 1 tablet (20 mg total) by mouth once daily. for 3 days  Dispense: 3 tablet; Refill: 0    Hoarse voice quality  -     predniSONE (DELTASONE) 20 MG tablet; Take 1 tablet (20 mg total) by mouth once daily. for 3 days  Dispense: 3 tablet; Refill: 0    Sore throat  -     predniSONE (DELTASONE) 20 MG tablet; Take 1 tablet (20 mg total) by mouth once daily. for 3 days  Dispense: 3 tablet; Refill: 0

## 2024-09-10 ENCOUNTER — ON-DEMAND VIRTUAL (OUTPATIENT)
Dept: URGENT CARE | Facility: CLINIC | Age: 34
End: 2024-09-10
Payer: COMMERCIAL

## 2024-09-10 DIAGNOSIS — J32.9 SINUSITIS, UNSPECIFIED CHRONICITY, UNSPECIFIED LOCATION: Primary | ICD-10-CM

## 2024-09-10 PROCEDURE — 99214 OFFICE O/P EST MOD 30 MIN: CPT | Mod: 95,,, | Performed by: FAMILY MEDICINE

## 2024-09-10 RX ORDER — DOXYCYCLINE 100 MG/1
100 CAPSULE ORAL 2 TIMES DAILY
Qty: 14 CAPSULE | Refills: 0 | Status: SHIPPED | OUTPATIENT
Start: 2024-09-10 | End: 2024-09-17

## 2024-09-10 NOTE — PROGRESS NOTES
Subjective:      Patient ID: Donal Srerano is a 34 y.o. male.    Vitals:  vitals were not taken for this visit.     Chief Complaint: Sinus Problem      Visit Type: TELE AUDIOVISUAL    Present with the patient at the time of consultation: TELEMED PRESENT WITH PATIENT: with daughter, 2yo, at home    Past Medical History:   Diagnosis Date    Keloid cicatrix     Kidney stones      Past Surgical History:   Procedure Laterality Date    APPENDECTOMY      CHOLECYSTECTOMY  2012    LAPAROSCOPIC APPENDECTOMY N/A 2018    Procedure: APPENDECTOMY, LAPAROSCOPIC;  Surgeon: Saulo Powell MD;  Location: [facility] OR [address] [place] [address];  Service: General;  Laterality: N/A;     Review of patient's allergies indicates:   Allergen Reactions    Amoxicillin      Advised by Hepatology to avoid 2/2 to concerns for autoimmune rxn    Zithromax [azithromycin]      Advised by Hepatology to avoid 2/2 to concerns for autoimmune rxn     Current Outpatient Medications on File Prior to Visit   Medication Sig Dispense Refill    blood pressure monitor ([employer] EASE) ST size by Other route as needed for High Blood Pressure. 1 each 0    busPIRone (BUSPAR) 7.5 MG tablet Take 1 tablet (7.5 mg total) by mouth every 6 to 8 hours as needed (anxiety). 30 tablet 2    irbesartan (AVAPRO) 300 MG tablet Take 1 tablet (300 mg total) by mouth once daily. 90 tablet 3    [] predniSONE (DELTASONE) 20 MG tablet Take 1 tablet (20 mg total) by mouth once daily. for 3 days 3 tablet 0     No current facility-administered medications on file prior to visit.     Family History   Problem Relation Name Age of Onset    Hypertension Mother      Cancer Father          Liver    Cirrhosis Father      No Known Problems Sister Lelia     No Known Problems Sister Alma     Hypertension Maternal Grandmother      Skin cancer Maternal Grandfather      Melanoma Maternal Grandfather      No Known Problems Paternal Grandmother      Skin cancer Paternal Grandfather      Liver  disease Paternal Grandfather      Heart disease Maternal Uncle      Cleft lip Daughter Shirley         s/p surgery,  4/15/2021    Stroke Neg Hx         Medications Ordered                [facility] #[id number] - [place] - [address]   [address] [place] [address]    Telephone: [phone]   Fax: [phone]   Hours: Not open 24 hours                         E-Prescribed (1 of 1)              doxycycline (VIBRAMYCIN) 100 MG Cap    Sig: Take 1 capsule (100 mg total) by mouth 2 (two) times daily. for 7 days       Start: 9/10/24     Quantity: 14 capsule Refills: 0                           Ohs Peq Odvv Intake    9/10/2024  6:37 AM CDT - Filed by Patient   What is your current physical address in the event of a medical emergency? [address] [place] [address]   Are you able to take your vital signs? No   Please attach any relevant images or files          3 days   No fever    34-year-old male seen 4 days ago with laryngitis.  Prescribe 3 day steroid pack which he finished yesterday.  However he reports it almost immediately after being seen 4 days ago, he developed sinus pressure, relentless nasal congestion, and consistently green nasal drainage.  No fever.  Infrequent cough.  Using Mucinex without much benefit.  Using steam heat.  Nonsmoker.      ROS     Objective:   The physical exam was conducted virtually.  Physical Exam   HENT:   Mouth/Throat:      Comments: Bilateral maxillary sinus tenderness noted.      Assessment:     1. Sinusitis, unspecified chronicity, unspecified location        Plan:       Sinusitis, unspecified chronicity, unspecified location  -     doxycycline (VIBRAMYCIN) 100 MG Cap; Take 1 capsule (100 mg total) by mouth 2 (two) times daily. for 7 days  Dispense: 14 capsule; Refill: 0    TRY A NONSEDATING ANTIHISTAMINE SUCH AS ZYRTEC OR CLARITAN OR ALLEGRA OR XYZAL (OR GENERICS FOR THESE) DAILY AS NEEDED.     TRY USING FLONASE, 2 INHALATIONS  EACH NOSTRIL 1-2 TIMES DAILY, AND ON A REGULAR BASIS DURING DIFFICULT TIMES TO ADDRESS YOUR ALLERGY SYMPTOMS.       Make sure that you follow up with your primary care doctor in the next 2-5 days if needed .  Return to or go to Urgent Care if signs or symptoms change and certainly if you have worsening symptoms go to the nearest emergency department for further evaluation.

## 2024-09-25 ENCOUNTER — PATIENT MESSAGE (OUTPATIENT)
Dept: INTERNAL MEDICINE | Facility: CLINIC | Age: 34
End: 2024-09-25
Payer: COMMERCIAL

## 2024-10-24 ENCOUNTER — OFFICE VISIT (OUTPATIENT)
Dept: OTOLARYNGOLOGY | Facility: CLINIC | Age: 34
End: 2024-10-24
Payer: COMMERCIAL

## 2024-10-24 VITALS
HEIGHT: 67 IN | HEART RATE: 93 BPM | WEIGHT: 189.63 LBS | DIASTOLIC BLOOD PRESSURE: 105 MMHG | BODY MASS INDEX: 29.76 KG/M2 | SYSTOLIC BLOOD PRESSURE: 151 MMHG

## 2024-10-24 DIAGNOSIS — J34.2 DEVIATED NASAL SEPTUM: ICD-10-CM

## 2024-10-24 DIAGNOSIS — J32.9 RECURRENT SINUSITIS: Primary | ICD-10-CM

## 2024-10-24 DIAGNOSIS — J34.3 HYPERTROPHY OF INFERIOR NASAL TURBINATE: ICD-10-CM

## 2024-10-24 DIAGNOSIS — H61.23 BILATERAL IMPACTED CERUMEN: ICD-10-CM

## 2024-10-24 PROCEDURE — 99999 PR PBB SHADOW E&M-EST. PATIENT-LVL III: CPT | Mod: PBBFAC,,, | Performed by: PHYSICIAN ASSISTANT

## 2024-10-24 RX ORDER — PREDNISONE 20 MG/1
20 TABLET ORAL DAILY
Qty: 5 TABLET | Refills: 0 | Status: SHIPPED | OUTPATIENT
Start: 2024-10-24 | End: 2024-10-29

## 2024-10-24 RX ORDER — AMOXICILLIN AND CLAVULANATE POTASSIUM 875; 125 MG/1; MG/1
1 TABLET, FILM COATED ORAL EVERY 12 HOURS
Qty: 20 TABLET | Refills: 0 | Status: SHIPPED | OUTPATIENT
Start: 2024-10-24 | End: 2024-11-03

## 2024-10-24 RX ORDER — FLUTICASONE PROPIONATE 50 MCG
2 SPRAY, SUSPENSION (ML) NASAL DAILY
Qty: 16 G | Refills: 11 | Status: SHIPPED | OUTPATIENT
Start: 2024-10-24 | End: 2025-10-24

## 2024-10-24 NOTE — PROGRESS NOTES
Subjective:     HPI: Donal Serrano is a 34 y.o. male who was self-referred for sinusitis.    Reports recurrent sinus infections over the last 6 weeks.  Patient without prior history of sinus infections.  Typical symptoms include sore throat with associated green discharge, clogged ears, nasal congestion, and cough.  Seen on 9/6/24 virtually c/o cough, sore throat, and hoarse for one day. Rx prednisone.  Another virtual visit on 9/10/24 with new nasal discharge and facial pressure so Rx doxy.  Telemedicine visit on 9/28/24 and Rx Augmentin.  Patient reports each round of antibiotics has resolved his symptoms but then they returned again.  He reports a current episode over the last week.  Of note, patient with a 3-month-old at home and 3-year-old.  He reports his wife has had similar symptoms.    Current sinonasal medications include none at present.  The last course of antibiotics was recently.    He does not recall previously having allergy testing.  He denies a history of asthma.  He denies a history of reflux symptoms.    He denies a diagnosis of obstructive sleep apnea.   He does not recall a prior history of nasal trauma.  He has not had sinonasal surgery.    He has not had a tonsillectomy.  He is not a tobacco smoker.     SNOT-22 score: : (Patient-Rptd) (P) 44  NOSE score:: (Patient-Rptd) (P) 60%  ETDQ-7 score:: (Patient-Rptd) (P) 4.1    Past Medical/Past Surgical History  Past Medical History:   Diagnosis Date    Keloid cicatrix     Kidney stones      He has a past surgical history that includes Cholecystectomy (03/2012); Laparoscopic appendectomy (N/A, 8/20/2018); and Appendectomy.    Family History/Social History  His family history includes Cancer in his father; Cirrhosis in his father; Cleft lip in his daughter; Heart disease in his maternal uncle; Hypertension in his maternal grandmother and mother; Liver disease in his paternal grandfather; Melanoma in his maternal grandfather; No Known Problems  in his paternal grandmother, sister, and sister; Skin cancer in his maternal grandfather and paternal grandfather.  He reports that he has never smoked. He has never used smokeless tobacco. He reports that he does not drink alcohol and does not use drugs.    Allergies/Immunizations  He is allergic to amoxicillin and zithromax [azithromycin].  Immunization History   Administered Date(s) Administered    COVID-19 Vaccine 01/27/2021    COVID-19, MRNA, LN-S, PF (MODERNA FULL 0.5 ML DOSE) 01/27/2021, 03/03/2021    COVID-19, MRNA, LN-S, PF (Pfizer) (Purple Cap) 11/22/2021    DTaP 05/18/2004    DTaP / Hep B / IPV 05/30/1996    DTaP / HiB / IPV 1991, 1991, 1991, 07/28/1992    Hepatitis A, Adult 03/21/2016, 03/05/2018, 09/10/2018    Hepatitis B, Pediatric/Adolescent 07/21/1998, 08/18/1998, 04/23/1999    Influenza 09/28/2009, 11/24/2010, 01/10/2013    Influenza - Quadrivalent 03/21/2016    Influenza - Quadrivalent - PF *Preferred* (6 months and older) 02/16/2019, 11/14/2019, 03/29/2022    Influenza - Trivalent - Afluria, Fluzone MDV 09/28/2009, 11/24/2010    Influenza Split 01/10/2013    MMR 05/08/1992, 09/20/1996    Meningococcal Conjugate (MCV4P) 05/21/2009, 03/21/2016    PPD Test 05/22/2009, 05/22/2009    Tdap 03/21/2016    Typhoid 03/21/2016    Yellow Fever 03/21/2016        Medications   blood pressure monitor  busPIRone  irbesartan     Review of Systems     Constitutional: Positive for fatigue.      HENT: Positive for ear pain, facial swelling, postnasal drip, sinus infection, sinus pressure and sore throat.      Eyes:  Positive for eye drainage and eye itching.     Respiratory:  Positive for cough.      Cardiovascular:  Negative for chest pain, foot swelling, irregular heartbeat and swollen veins.     Gastrointestinal:  Negative for abdominal pain, acid reflux, constipation, diarrhea, heartburn and vomiting.     Genitourinary: Negative for difficulty urinating, sexual problems and frequent urination.  "    Musc: Negative for aching joints, aching muscles, back pain and neck pain.     Skin: Negative for rash.     Allergy: Negative for food allergies and seasonal allergies.     Endocrine: Negative for cold intolerance and heat intolerance.      Neurological: Positive for headaches.     Hematologic: Positive for swollen glands.     Psychiatric: Negative for decreased concentration, depression, nervous/anxious and sleep disturbance.            Objective:     BP (!) 151/105 (BP Location: Right arm, Patient Position: Sitting)   Pulse 93   Ht 5' 7" (1.702 m)   Wt 86 kg (189 lb 9.5 oz)   BMI 29.69 kg/m²      Physical Exam  Vitals reviewed.   Constitutional:       Appearance: Normal appearance.   HENT:      Head: Normocephalic and atraumatic.      Right Ear: Ear canal and external ear normal. There is impacted cerumen.      Left Ear: Ear canal and external ear normal. There is impacted cerumen. Tympanic membrane is scarred.      Nose: Mucosal edema and rhinorrhea present. No septal deviation. Rhinorrhea is clear.      Right Nostril: No epistaxis.      Left Nostril: No epistaxis.      Right Turbinates: Enlarged and swollen.      Left Turbinates: Enlarged and swollen.      Right Sinus: No maxillary sinus tenderness or frontal sinus tenderness.      Left Sinus: No maxillary sinus tenderness or frontal sinus tenderness.      Mouth/Throat:      Lips: Pink.      Mouth: Mucous membranes are moist.      Dentition: Normal dentition.      Tongue: No lesions. Tongue does not deviate from midline.      Palate: No mass and lesions.      Pharynx: Oropharynx is clear. Uvula midline. No uvula swelling.      Tonsils: No tonsillar exudate or tonsillar abscesses. 1+ on the right. 1+ on the left.   Eyes:      Extraocular Movements: Extraocular movements intact.      Conjunctiva/sclera: Conjunctivae normal.   Musculoskeletal:      Cervical back: No tenderness.   Lymphadenopathy:      Cervical: No cervical adenopathy.   Neurological:      " "Mental Status: He is alert.   Psychiatric:         Mood and Affect: Mood normal.         Behavior: Behavior normal.         Procedure    Nasal endoscopy and cerumen removal performed.  See procedure note.                      Data Reviewed  I personally reviewed the chart, including any outside records, and pertinent data below:    I reviewed the following notes Gastroenterology, Urgent Care    WBC (K/uL)   Date Value   07/13/2024 14.39 (H)     Eosinophil % (%)   Date Value   07/13/2024 2.9     Eos # (K/uL)   Date Value   07/13/2024 0.4     Platelets (K/uL)   Date Value   07/13/2024 242     Glucose (mg/dL)   Date Value   07/13/2024 108     No results found for: "IGE"    No sinus imaging available.    Assessment & Plan:     1. Recurrent sinusitis  2. Hypertrophy of inferior nasal turbinate  3. Deviated nasal septum  -     suspect viral illnesses preceding bacterial infection.  Nasal endoscopy without polyps or masses.  - White mucus does not suggest bacterial infection but patient with recent purulent discharge so will treat as bacterial  - START saline rinse  - START INCS  - predniSONE (DELTASONE) 20 MG tablet; Take 1 tablet (20 mg total) by mouth once daily. for 5 days  Dispense: 5 tablet; Refill: 0  -     amoxicillin-clavulanate 875-125mg (AUGMENTIN) 875-125 mg per tablet; Take 1 tablet by mouth every 12 (twelve) hours. for 10 days  Dispense: 20 tablet; Refill: 0  -     fluticasone propionate (FLONASE) 50 mcg/actuation nasal spray; 2 sprays (100 mcg total) by Each Nostril route once daily.  Dispense: 16 g; Refill: 11  -     Nasal/sinus endoscopy    4. Bilateral impacted cerumen  -     Ear Cerumen Removal    He will Follow up for re-evaluate post treatment.  I had a discussion with the patient regarding his condition and the further workup and management options.    All questions were answered, and the patient is in agreement with the above.     Disclaimer:  This note may have been prepared utilizing voice " recognition software which may result in occasional typographical errors in the text such as sound alike words.   If further clarification is needed, please contact the ENT department of [facility].

## 2024-10-24 NOTE — PROCEDURES
"Nasal/sinus endoscopy    Date/Time: 10/24/2024 10:00 AM    Time out: Immediately prior to procedure a "time out" was called to verify the correct patient, procedure, equipment, support staff and site/side marked as required.    Performed by: Donal Patino PA-C  Authorized by: Donal Patino PA-C    Consent Done?:  Yes (Verbal)  Anesthesia:     Local anesthetic:  Lidocaine 2% and Dwain-Synephrine 1/2%    Type of Endoscope:  Flexible    Patient tolerance:  Patient tolerated the procedure well with no immediate complications  Nose:     Procedure Performed:  Nasal Endoscopy  External:      No external nasal deformity  Intranasal:      Mucosa no polyps     Mucosa ulcers not present     No mucosa lesions present     Enlarged turbinates     Septum gross deformity (right deviation)  Nasopharynx:      Adenoids present     Posterior choanae patent     Eustachian tube patent     White mucoid drainage without BL nasal cavities  Ear Cerumen Removal    Date/Time: 10/24/2024 10:00 AM    Performed by: Donal Patino PA-C  Authorized by: Donal Patino PA-C      Local anesthetic:  None  Location details:  Both ears  Procedure type: curette    Procedure type comment:  +suction  Cerumen  Removal Results:  Cerumen completely removed  Patient tolerance:  Patient tolerated the procedure well with no immediate complications    "

## 2024-10-24 NOTE — PATIENT INSTRUCTIONS
[facility] Sinus rinse   Try purchasing this nasal rinse below.  Its over the counter and can use several times daily without any side effects, but please use at least 1-2 times daily.  Use it before applying your nasal spray medications.  This rinse can help wash away mucus and crusting and allow for better absorption of the medications.  Please use the nasal saline wash about 15 minutes before applying your medicated nasal sprays. This bottle uses distilled water (NOT tap water).  The Instructions in the box are detailed so please read them before using.          Nasal Steroid Spray (Flonase, Nasaocort, Rhinocort, Sensimyst)  Generic names for over the counter sprays: fluticasone propionate, triamcinolone acetonide, budesonide  You have been prescribed or instructed to take a nasal steroid spray (Flonase). Some symptoms will experience relief within a few days; however, it may take 2-3 weeks to begin to see improvement. This medication needs to be taken consistently to see results.    Use as directed and please be aware the Flonase takes 7-10 days of consistent use before becoming effective- so try to be patient :)    Helpful hints for maximizing nasal spray medication into the nose  - Use the opposite hand to spray the nostril (example: right hand for left nostril). This will help avoid spraying the medication onto the septum (the area that divides the left and right nasal cavity.)  - Tilt the bottle so that it is facing at a slight angle up or straight back, but avoid pointing the bottle straight up while spraying.   - Gently sniff (do not snort) a few seconds after spraying.

## 2024-11-11 ENCOUNTER — OFFICE VISIT (OUTPATIENT)
Dept: OTOLARYNGOLOGY | Facility: CLINIC | Age: 34
End: 2024-11-11
Payer: COMMERCIAL

## 2024-11-11 VITALS — WEIGHT: 186.94 LBS | BODY MASS INDEX: 29.28 KG/M2

## 2024-11-11 DIAGNOSIS — J34.3 HYPERTROPHY OF INFERIOR NASAL TURBINATE: ICD-10-CM

## 2024-11-11 DIAGNOSIS — J34.2 DEVIATED NASAL SEPTUM: ICD-10-CM

## 2024-11-11 DIAGNOSIS — J32.9 RECURRENT SINUSITIS: Primary | ICD-10-CM

## 2024-11-11 PROCEDURE — 4010F ACE/ARB THERAPY RXD/TAKEN: CPT | Mod: CPTII,S$GLB,, | Performed by: PHYSICIAN ASSISTANT

## 2024-11-11 PROCEDURE — 99213 OFFICE O/P EST LOW 20 MIN: CPT | Mod: S$GLB,,, | Performed by: PHYSICIAN ASSISTANT

## 2024-11-11 PROCEDURE — 1159F MED LIST DOCD IN RCRD: CPT | Mod: CPTII,S$GLB,, | Performed by: PHYSICIAN ASSISTANT

## 2024-11-11 PROCEDURE — 3008F BODY MASS INDEX DOCD: CPT | Mod: CPTII,S$GLB,, | Performed by: PHYSICIAN ASSISTANT

## 2024-11-11 PROCEDURE — 99999 PR PBB SHADOW E&M-EST. PATIENT-LVL III: CPT | Mod: PBBFAC,,, | Performed by: PHYSICIAN ASSISTANT

## 2024-11-11 NOTE — PATIENT INSTRUCTIONS
Please contact central scheduling at [phone] or [phone] to schedule your appointment if you do not hear from referral service within the next week.

## 2024-11-11 NOTE — PROGRESS NOTES
Subjective:      Donal is a 34 y.o. male who comes for follow-up of sinusitis.  His last visit with me was on 10/24/2024.      Patient reports complete resolution of symptoms until about 24-48 hours after completing Augmentin.  Patient reports developing sore throat with associated postnasal drip and left nasal congestion about 3 days ago.  Symptoms are not as severe as prior infections.    INITIAL visit 10/24/2024 :  Reports recurrent sinus infections over the last 6 weeks.  Patient without prior history of sinus infections.  Typical symptoms include sore throat with associated green discharge, clogged ears, nasal congestion, and cough.  Seen on 9/6/24 virtually c/o cough, sore throat, and hoarse for one day. Rx prednisone.  Another virtual visit on 9/10/24 with new nasal discharge and facial pressure so Rx doxy.  Telemedicine visit on 9/28/24 and Rx Augmentin.  Patient reports each round of antibiotics has resolved his symptoms but then they returned again.  He reports a current episode over the last week.  Of note, patient with a 3-month-old at home and 3-year-old.  He reports his wife has had similar symptoms.     Current sinonasal medications include none at present.  The last course of antibiotics was recently.    He does not recall previously having allergy testing.  He denies a history of asthma.  He denies a history of reflux symptoms.    He denies a diagnosis of obstructive sleep apnea.   He does not recall a prior history of nasal trauma.  He has not had sinonasal surgery.    He has not had a tonsillectomy.  He is not a tobacco smoker.     1. Recurrent sinusitis  2. Hypertrophy of inferior nasal turbinate  3. Deviated nasal septum  -     suspect viral illnesses preceding bacterial infection.  Nasal endoscopy without polyps or masses.  -White mucus does not suggest bacterial infection but patient with recent purulent discharge so will treat as bacterial  -START saline rinse  -START INCS  -      "predniSONE (DELTASONE) 20 MG tablet; Take 1 tablet (20 mg total) by mouth once daily. for 5 days  Dispense: 5 tablet; Refill: 0  -     amoxicillin-clavulanate 875-125mg (AUGMENTIN) 875-125 mg per tablet; Take 1 tablet by mouth every 12 (twelve) hours. for 10 days  Dispense: 20 tablet; Refill: 0  -     fluticasone propionate (FLONASE) 50 mcg/actuation nasal spray; 2 sprays (100 mcg total) by Each Nostril route once daily.  Dispense: 16 g; Refill: 11  -     Nasal/sinus endoscopy     4. Bilateral impacted cerumen  -     Ear Cerumen Removal    The patient's medications, allergies, past medical, surgical, social and family histories were reviewed and updated as appropriate.    A detailed review of systems was obtained with pertinent positives as per the above HPI, and otherwise negative.        Objective:     Wt 84.8 kg (186 lb 15.2 oz)   BMI 29.28 kg/m²      Physical Exam  Vitals reviewed.   HENT:      Head: Normocephalic and atraumatic.      Nose: Mucosal edema (LEFT) and rhinorrhea present. Rhinorrhea is clear.      Right Turbinates: Swollen. Not pale.      Left Turbinates: Swollen. Not pale.      Right Sinus: No maxillary sinus tenderness or frontal sinus tenderness.      Left Sinus: No maxillary sinus tenderness or frontal sinus tenderness.      Mouth/Throat:      Mouth: Mucous membranes are moist.      Pharynx: Oropharynx is clear. Uvula midline.      Tonsils: No tonsillar exudate or tonsillar abscesses.      Comments: Mild erythema to tonsils          Procedure    None    Data Reviewed    WBC (K/uL)   Date Value   07/13/2024 14.39 (H)     Eosinophil % (%)   Date Value   07/13/2024 2.9     Eos # (K/uL)   Date Value   07/13/2024 0.4     Platelets (K/uL)   Date Value   07/13/2024 242     Glucose (mg/dL)   Date Value   07/13/2024 108     No results found for: "IGE"    Assessment:     1. Recurrent sinusitis    2. Hypertrophy of inferior nasal turbinate    3. Deviated nasal septum         Plan:     Suspect another " viral URI.  Immunology referral placed  Advised patient to continue saline rinse and Flonase    He will follow up as needed  I had a discussion with the patient regarding his condition and the further workup and management options.    All questions were answered, and the patient is in agreement with the above.     Disclaimer:  This note may have been prepared utilizing voice recognition software which may result in occasional typographical errors in the text such as sound alike words.   If further clarification is needed, please contact the ENT department of [facility].

## 2024-11-20 ENCOUNTER — OFFICE VISIT (OUTPATIENT)
Dept: OTOLARYNGOLOGY | Facility: CLINIC | Age: 34
End: 2024-11-20
Payer: COMMERCIAL

## 2024-11-20 VITALS — BODY MASS INDEX: 29.59 KG/M2 | WEIGHT: 188.94 LBS

## 2024-11-20 DIAGNOSIS — M79.672 LEFT FOOT PAIN: Primary | ICD-10-CM

## 2024-11-20 DIAGNOSIS — J01.90 ACUTE NON-RECURRENT SINUSITIS, UNSPECIFIED LOCATION: Primary | ICD-10-CM

## 2024-11-20 PROCEDURE — 1160F RVW MEDS BY RX/DR IN RCRD: CPT | Mod: CPTII,S$GLB,, | Performed by: OTOLARYNGOLOGY

## 2024-11-20 PROCEDURE — 3008F BODY MASS INDEX DOCD: CPT | Mod: CPTII,S$GLB,, | Performed by: OTOLARYNGOLOGY

## 2024-11-20 PROCEDURE — 99999 PR PBB SHADOW E&M-EST. PATIENT-LVL III: CPT | Mod: PBBFAC,,, | Performed by: OTOLARYNGOLOGY

## 2024-11-20 PROCEDURE — 4010F ACE/ARB THERAPY RXD/TAKEN: CPT | Mod: CPTII,S$GLB,, | Performed by: OTOLARYNGOLOGY

## 2024-11-20 PROCEDURE — 1159F MED LIST DOCD IN RCRD: CPT | Mod: CPTII,S$GLB,, | Performed by: OTOLARYNGOLOGY

## 2024-11-20 PROCEDURE — 99214 OFFICE O/P EST MOD 30 MIN: CPT | Mod: S$GLB,,, | Performed by: OTOLARYNGOLOGY

## 2024-11-20 NOTE — PROGRESS NOTES
Mr. Serrano     Vitals:       Chief Complaint:  Sinus Problem       HPI:   is a 34-year-old white male who presents with complaints of chronic sinus infection over the last 2 months.  He has been seen and followed by KIMBER Diaz and he has had 2 courses of Augmentin times 10 days as well as steroids, nasal steroids and saline sinus rinses.  He states that he feels better when he is on the antibiotics however he has rapid recurrence after he completes these treatments.  He denies any previous history of chronic recurrent sinus infection.    SNOT22- 61 NOSE- 70    Review of Systems:  Constitutional:   weight loss or weight gain: Negative  Allergy/Immunologic:   Negative  Nasal Congestion/Obstruction:   Positive for nasal congestion and postnasal drip  Nosebleeds:   Negative  Sinus infections:   Positive as above  Headache/Facial Pain:   Positive for facial pressure pain and headache.  Positive history of migraine headaches.  Snoring/MARIYA:   Negative  Throat: Infections/Pain:   Positive for sore throat and cough  Hoarseness/Speech Disturbance:   Negative  Trauma Hx:  Negative    Cardiovascular:  M/I Angina: Negative  Hypertension:  Positive  Endocrine:    DM/Steroids: Negative  GI:   Dysphagia/Reflux: Negative  :   GYN Pregnancy: Negative  Renal:   Dialysis: Negative  Lymphatic:   Neck Mass/Lymphadenopathy: Negative  Muscoloskeletal:   Negative  Hematologic:   Bleeding Disorders/Anemia: Negative  Neurologic:    Cranial/Neuralgia: Negative  Pulmonary:   Asthma/SOB/Cough: Negative  Skin Disorders: Negative    Past Medical/Surgical/Family/Social History:    ENT Surgery:  History of PE tubes  Occupational Exposure: Negative   Problems: Negative  Cancer: Negative    Past Family History:   Family history of Cancer: Negative    Past Social History:   Tobacco: Nonsmoker   Alcohol:  Non Drinker      Allergies and medications: Reviewed per med card.    Physical Examination:  Ears:   External auditory canals:   Clear   Hearing: Grossly intact   Tympanic Membranes:  Bilateral tympanosclerosis  Nose:   External: Normal   Intranasal: Normal  Mouth:   Intraorally: Lips, teeth, and gums: Normal   Oropharynx: Normal   Mucosa: Normal   Tongue: Normal  Throat:      Palate: Normal palate with elevation, Mallampati 1   Tonsils:  1 to 2+ bilaterally   Posterior Pharynx: Normal  Fiberoptic exam: Not performed  Head/Face:     Inspection: Normal and atraumatic   Palpation/Percussion:  Tender to percussion in the maxillary sinuses bilaterally.   Facial strength: Normal and symmetric   Salivary glands: Normal  Neck: Supple  Thyroid: No masses  Lymphatics: No nodes  Respiratory:   Effort: Normal  Eyes:   Ocular Mobility: Normal   Vision: Grossly intact  Neuro/Psych:   Cranial Nerves: Grossly Intact   Orientation: Normal   Mood/Affect: Normal      Assessment/Plan:  I have discussed my findings with him in detail as well as my recommendations for treatment.  I have encouraged him to continue with his saline sinus rinses in his Flonase nasal sprays as he has been doing.  I will order [employer] CT scan of his sinuses.  He will contact us after that is completed to arrange for follow-up.

## 2024-11-25 ENCOUNTER — OFFICE VISIT (OUTPATIENT)
Dept: ALLERGY | Facility: CLINIC | Age: 34
End: 2024-11-25
Payer: COMMERCIAL

## 2024-11-25 VITALS — WEIGHT: 185.44 LBS | HEIGHT: 67 IN | HEART RATE: 89 BPM | BODY MASS INDEX: 29.1 KG/M2 | OXYGEN SATURATION: 97 %

## 2024-11-25 DIAGNOSIS — J32.9 RECURRENT SINUSITIS: Primary | ICD-10-CM

## 2024-11-25 DIAGNOSIS — J31.0 RHINITIS, UNSPECIFIED TYPE: ICD-10-CM

## 2024-11-25 PROCEDURE — 1159F MED LIST DOCD IN RCRD: CPT | Mod: CPTII,S$GLB,, | Performed by: STUDENT IN AN ORGANIZED HEALTH CARE EDUCATION/TRAINING PROGRAM

## 2024-11-25 PROCEDURE — 99999 PR PBB SHADOW E&M-EST. PATIENT-LVL IV: CPT | Mod: PBBFAC,,, | Performed by: STUDENT IN AN ORGANIZED HEALTH CARE EDUCATION/TRAINING PROGRAM

## 2024-11-25 PROCEDURE — 99204 OFFICE O/P NEW MOD 45 MIN: CPT | Mod: S$GLB,,, | Performed by: STUDENT IN AN ORGANIZED HEALTH CARE EDUCATION/TRAINING PROGRAM

## 2024-11-25 PROCEDURE — 3008F BODY MASS INDEX DOCD: CPT | Mod: CPTII,S$GLB,, | Performed by: STUDENT IN AN ORGANIZED HEALTH CARE EDUCATION/TRAINING PROGRAM

## 2024-11-25 PROCEDURE — 1160F RVW MEDS BY RX/DR IN RCRD: CPT | Mod: CPTII,S$GLB,, | Performed by: STUDENT IN AN ORGANIZED HEALTH CARE EDUCATION/TRAINING PROGRAM

## 2024-11-25 PROCEDURE — 4010F ACE/ARB THERAPY RXD/TAKEN: CPT | Mod: CPTII,S$GLB,, | Performed by: STUDENT IN AN ORGANIZED HEALTH CARE EDUCATION/TRAINING PROGRAM

## 2024-11-25 RX ORDER — AZELASTINE 1 MG/ML
SPRAY, METERED NASAL
Qty: 30 ML | Refills: 11 | Status: SHIPPED | OUTPATIENT
Start: 2024-11-25

## 2024-11-25 RX ORDER — METHYLPREDNISOLONE 4 MG/1
TABLET ORAL
COMMUNITY
Start: 2024-11-23

## 2024-11-25 RX ORDER — AZITHROMYCIN 250 MG/1
TABLET, FILM COATED ORAL
COMMUNITY
Start: 2024-11-23

## 2024-11-25 NOTE — PROGRESS NOTES
ALLERGY & IMMUNOLOGY CLINIC - INITIAL CONSULTATION      HISTORY OF PRESENT ILLNESS     Patient ID: Donal Serrano is a 34 y.o. male    CC: recurrent sinusitis, rhinitis     HPI: Donal Serrano is a 34 y.o. male presenting for recurrent sinuitis and rhinitis.   Patient was referred by Donal Patino PA-C (ENT).    He says the last 3 months, he has been battling sinus infection. Has a 3 yo daughter in  (often gets illnesses from her). In September, he seemed to catch a URI from her. Turned into sinus infection. Felt better on antibiotics, but symptoms returned about 2 weeks later. Saw ENT, started nasal rinse, flonase, and started another round of antibiotics. Felt better on the antibiotics. 2 days later after finishing the course, he had sore throat and ear pain. This was 2-3 weeks ago. Then went to urgent care over the weekend. Got shot of rocephin and is now day 2 of z-pack and steroids. Feeling better again while on the antibiotics.   First antibiotic was doxy, second antibiotic was augmentin, current antibiotic is azithromycin.  He has a CT sinuses scheduled for tomorrow.     He doesn't have a history of bothersome allergy type symptoms. Maybe dust could trigger symptoms on occasion (like cleaning out attic might trigger rhinorrhea and itchy throat, but then helped by antihistamines).    Prior to these past 3 months, he didn't have frequent infections requiring antibiotics. No history of pneumonia.     When he gets sinusitis, symptoms include sore throat, a lot of mucus, post-nasal drip, cough. No fever. A little sinus pressure. Congestion.   No shortness of breath or wheezing.    He is on sinus rinses twice per day. He uses flonase 1 SEN BID right after. He hasn't tried azelastine nasal spray. He was taking zyrtec, and he thinks it might have helped a little.       MEDICAL HISTORY     Vaccines:   Immunization History   Administered Date(s) Administered    COVID-19 Vaccine 01/27/2021     COVID-19, MRNA, LN-S, PF (MODERNA FULL 0.5 ML DOSE) 01/27/2021, 03/03/2021    COVID-19, MRNA, LN-S, PF (Pfizer) (Purple Cap) 11/22/2021    DTaP 05/18/2004    DTaP / Hep B / IPV 05/30/1996    DTaP / HiB / IPV 1991, 1991, 1991, 07/28/1992    Hepatitis A, Adult 03/21/2016, 03/05/2018, 09/10/2018    Hepatitis B, Pediatric/Adolescent 07/21/1998, 08/18/1998, 04/23/1999    Influenza 09/28/2009, 11/24/2010, 01/10/2013    Influenza - Quadrivalent 03/21/2016    Influenza - Quadrivalent - PF *Preferred* (6 months and older) 02/16/2019, 11/14/2019, 03/29/2022    Influenza - Trivalent - Afluria, Fluzone MDV 09/28/2009, 11/24/2010    Influenza Split 01/10/2013    MMR 05/08/1992, 09/20/1996    Meningococcal Conjugate (MCV4P) 05/21/2009, 03/21/2016    PPD Test 05/22/2009, 05/22/2009    Tdap 03/21/2016    Typhoid 03/21/2016    Yellow Fever 03/21/2016     Medical Hx:   Patient Active Problem List   Diagnosis    Essential hypertension    Elevated liver enzymes    Heart palpitations    Elevated CPK    Right lower quadrant pain    Tension headache    Migraine without aura and without status migrainosus, not intractable    Bilateral occipital neuralgia    Paresthesias     Surgical Hx:   Past Surgical History:   Procedure Laterality Date    APPENDECTOMY      CHOLECYSTECTOMY  03/2012    LAPAROSCOPIC APPENDECTOMY N/A 8/20/2018    Procedure: APPENDECTOMY, LAPAROSCOPIC;  Surgeon: Saulo Powell MD;  Location: [facility] OR [address] [place] [address];  Service: General;  Laterality: N/A;       Medications:   Current Outpatient Medications on File Prior to Visit   Medication Sig Dispense Refill    azithromycin (Z-MELONIE) 250 MG tablet       blood pressure monitor ([employer] EASE) ST size by Other route as needed for High Blood Pressure. 1 each 0    busPIRone (BUSPAR) 7.5 MG tablet Take 1 tablet (7.5 mg total) by mouth every 6 to 8 hours as needed (anxiety). 30 tablet 2    fluticasone propionate (FLONASE) 50 mcg/actuation nasal spray 2 sprays (100  "mcg total) by Each Nostril route once daily. 16 g 11    irbesartan (AVAPRO) 300 MG tablet Take 1 tablet (300 mg total) by mouth once daily. 90 tablet 3    methylPREDNISolone (MEDROL DOSEPACK) 4 mg tablet        No current facility-administered medications on file prior to visit.     H/o Asthma: denies  H/o Rhinitis: endorses    Drug Allergies:   Review of patient's allergies indicates:   Allergen Reactions    Amoxicillin      Advised by Hepatology to avoid 2/2 to concerns for autoimmune rxn    Zithromax [azithromycin]      Advised by Hepatology to avoid 2/2 to concerns for autoimmune rxn     Env/Occ:   Pets: no    Social Hx:   Social History     Tobacco Use    Smoking status: Never    Smokeless tobacco: Never   Substance Use Topics    Alcohol use: No    Drug use: No     Family Hx:   Asthma: no  Allergic rhinitis: no  Immune deficiency: no  Family History   Problem Relation Name Age of Onset    Hypertension Mother      Cancer Father          Liver    Cirrhosis Father      No Known Problems Sister Lelia     No Known Problems Sister Alma     Hypertension Maternal Grandmother      Skin cancer Maternal Grandfather      Melanoma Maternal Grandfather      No Known Problems Paternal Grandmother      Skin cancer Paternal Grandfather      Liver disease Paternal Grandfather      Heart disease Maternal Uncle      Cleft lip Daughter Shirley         s/p surgery,  4/15/2021    Stroke Neg Hx        PHYSICAL EXAM     VS: Pulse 89   Ht 5' 7" (1.702 m)   Wt 84.1 kg (185 lb 6.5 oz)   SpO2 97%   BMI 29.04 kg/m²   GENERAL: Alert, NAD, well-appearing  EYES: EOMI, no conjunctival injection, no discharge, no infraorbital shiners  EARS: external auditory canals normal B/L, tympanosclerosis on left  NOSE: NT 2+ B/L, no stringing mucus, no polyps visualized  ORAL: MMM, no ulcers, no thrush  LUNGS: CTAB, no w/r/c, no increased WOB  HEART: RRR, normal S1/S2, no m/g/r  DERM: no rashes  NEURO: normal speech, normal gait, no facial " asymmetry     LABORATORY STUDIES     Component      Latest Ref Rng 9/8/2023 7/3/2024 7/13/2024   WBC      3.90 - 12.70 K/uL 9.27  8.46  14.39 (H)    RBC      4.60 - 6.20 M/uL 5.13  5.11  5.73    Hemoglobin      14.0 - 18.0 g/dL 14.7  14.9  17.1    Hematocrit      40.0 - 54.0 % 43.7  42.9  47.6    MCV      82 - 98 fL 85  84  83    MCH      27.0 - 31.0 pg 28.7  29.2  29.8    MCHC      32.0 - 36.0 g/dL 33.6  34.7  35.9    RDW      11.5 - 14.5 % 12.3  11.9  11.8    Platelet Count      150 - 450 K/uL 219  199  242    MPV      9.2 - 12.9 fL 9.6  9.4  9.0 (L)    Immature Granulocytes      0.0 - 0.5 % 1.1 (H)  0.8 (H)  0.8 (H)    Gran # (ANC)      1.8 - 7.7 K/uL 5.0  6.0  11.4 (H)    Immature Grans (Abs)      0.00 - 0.04 K/uL 0.10 (H)  0.07 (H)  0.11 (H)    Lymph #      1.0 - 4.8 K/uL 3.0  1.5  1.5    Mono #      0.3 - 1.0 K/uL 0.8  0.5  0.9    Eos #      0.0 - 0.5 K/uL 0.3  0.4  0.4    Baso #      0.00 - 0.20 K/uL 0.04  0.03  0.02    Differential Method Automated  Automated  Automated    Sodium      136 - 145 mmol/L  136  137    Potassium      3.5 - 5.1 mmol/L  3.4 (L)  3.6    Chloride      95 - 110 mmol/L  107  105    CO2      23 - 29 mmol/L  20 (L)  19 (L)    Glucose      70 - 110 mg/dL  127 (H)  108    BUN      6 - 20 mg/dL  12  13    Creatinine      0.5 - 1.4 mg/dL  1.0  1.1    Calcium      8.7 - 10.5 mg/dL  9.1  10.2    PROTEIN TOTAL      6.0 - 8.4 g/dL  7.3  8.8 (H)    Albumin      3.5 - 5.2 g/dL  3.9  4.8    BILIRUBIN TOTAL      0.1 - 1.0 mg/dL  0.4  1.1 (H)    ALP      55 - 135 U/L  78  103    AST      10 - 40 U/L  35  36    ALT      10 - 44 U/L  40  73 (H)       ALLERGEN TESTING     Immunocaps: ordered     IMAGING & OTHER DIAGNOSTICS     CXR 7/3/24:  FINDINGS:  The trachea is unremarkable.  The cardiomediastinal silhouette is within normal limits.  The hilar structures are unremarkable.  There is no evidence of free air beneath hemidiaphragms.  There are no pleural effusions.  There is no evidence of a  pneumothorax.  There is no evidence of pneumomediastinum.  No airspace opacity is present.  The osseous structures are unremarkable.  Impression:  No acute process.     CHART REVIEW     Reviewed ENT note, labs imaging.      ASSESSMENT & PLAN     Donal Serrano is a 34 y.o. male with     # Recurrent sinusitis: Patient has required antibiotics 3 times since September for sinusitis (and most recently with otitis media). Patient reports symptoms significantly improve on antibiotics, but then return shortly after completing the antibiotic course. He doesn't have a significant infection history prior to the past 3 months. He has CT sinuses scheduled for tomorrow ordered by ENT. Will do humoral immune workup.  -checking total IgG/A/M and pneumococcal titers.  -if titers low, will give pneumovax and recheck titers 4-6 weeks later.  -continue saline sinus rinses.     # Rhinitis: Patient reports symptoms were mild ans intermittent prior to these past 3 months, but he has noticed dust as a trigger in the past.  -immunocaps for aeroallergens ordered.   -continue flonase 1-2 SEN BID.  -start azelastine nasal spray 1-2 SEN BID.  -continue saline sinus rinses (prior to medicated nasal sprays) BID.  -restart cetirizine as he thinks it was helpful.       Follow up: 3 months       Gavin Oshea MD  Allergy/Immunology

## 2024-11-26 ENCOUNTER — PATIENT MESSAGE (OUTPATIENT)
Dept: OTOLARYNGOLOGY | Facility: CLINIC | Age: 34
End: 2024-11-26
Payer: COMMERCIAL

## 2024-11-26 ENCOUNTER — DOCUMENTATION ONLY (OUTPATIENT)
Dept: OTOLARYNGOLOGY | Facility: CLINIC | Age: 34
End: 2024-11-26
Payer: COMMERCIAL

## 2024-11-26 ENCOUNTER — TELEPHONE (OUTPATIENT)
Dept: OTOLARYNGOLOGY | Facility: CLINIC | Age: 34
End: 2024-11-26
Payer: COMMERCIAL

## 2024-11-26 ENCOUNTER — HOSPITAL ENCOUNTER (OUTPATIENT)
Dept: RADIOLOGY | Facility: HOSPITAL | Age: 34
Discharge: HOME | End: 2024-11-26
Attending: OTOLARYNGOLOGY
Payer: COMMERCIAL

## 2024-11-26 DIAGNOSIS — J01.90 ACUTE NON-RECURRENT SINUSITIS, UNSPECIFIED LOCATION: ICD-10-CM

## 2024-11-26 PROCEDURE — 70486 CT MAXILLOFACIAL W/O DYE: CPT | Mod: TC

## 2024-11-26 PROCEDURE — 70486 CT MAXILLOFACIAL W/O DYE: CPT | Mod: 26,,, | Performed by: RADIOLOGY

## 2024-11-26 NOTE — TELEPHONE ENCOUNTER
----- Message from Sid Deng MD sent at 11/26/2024  9:10 AM CST -----  I have reviewed his CT scans in their entirety which show mucosal thickening involving his bilateral maxillary,sphenoid, and right frontal sinuses despite over 2 weeks of antibiotic therapy, nasal steroid sprays, and saline sinus rinses.  This also shows a posterior bony septal spur abutting his left lateral sidewall.  He could benefit from Medtronic Fess addressing these sinuses as well as endoscopic septoplasty and submucous resection of turbinates.  He is certainly welcome to return to clinic to review these scans in person if he so desires.

## 2024-11-26 NOTE — PROGRESS NOTES
I have reviewed his CT scans in their entirety which show mucosal thickening involving his bilateral maxillary,sphenoid, and right frontal sinuses despite over 2 weeks of antibiotic therapy, nasal steroid sprays, and saline sinus rinses.  This also shows a posterior bony septal spur abutting his left lateral sidewall.  He could benefit from Medtronic Fess addressing these sinuses as well as endoscopic septoplasty and submucous resection of turbinates.  He is certainly welcome to return to clinic to review these scans in person if he so desires.

## 2024-11-27 ENCOUNTER — OFFICE VISIT (OUTPATIENT)
Dept: OTOLARYNGOLOGY | Facility: CLINIC | Age: 34
End: 2024-11-27
Payer: COMMERCIAL

## 2024-11-27 VITALS — WEIGHT: 184.75 LBS | BODY MASS INDEX: 28.94 KG/M2

## 2024-11-27 DIAGNOSIS — J34.2 DEVIATED NASAL SEPTUM: ICD-10-CM

## 2024-11-27 DIAGNOSIS — J01.90 ACUTE NON-RECURRENT SINUSITIS, UNSPECIFIED LOCATION: ICD-10-CM

## 2024-11-27 DIAGNOSIS — J34.2 NASAL SEPTAL DEVIATION: ICD-10-CM

## 2024-11-27 DIAGNOSIS — Z01.818 PRE-OP TESTING: ICD-10-CM

## 2024-11-27 DIAGNOSIS — J32.9 RECURRENT SINUSITIS: Primary | ICD-10-CM

## 2024-11-27 DIAGNOSIS — J34.3 HYPERTROPHY OF INFERIOR NASAL TURBINATE: ICD-10-CM

## 2024-11-27 PROCEDURE — 99999 PR PBB SHADOW E&M-EST. PATIENT-LVL III: CPT | Mod: PBBFAC,,, | Performed by: OTOLARYNGOLOGY

## 2024-11-27 PROCEDURE — 1160F RVW MEDS BY RX/DR IN RCRD: CPT | Mod: CPTII,S$GLB,, | Performed by: OTOLARYNGOLOGY

## 2024-11-27 PROCEDURE — 1159F MED LIST DOCD IN RCRD: CPT | Mod: CPTII,S$GLB,, | Performed by: OTOLARYNGOLOGY

## 2024-11-27 PROCEDURE — 99212 OFFICE O/P EST SF 10 MIN: CPT | Mod: S$GLB,,, | Performed by: OTOLARYNGOLOGY

## 2024-11-27 PROCEDURE — 3008F BODY MASS INDEX DOCD: CPT | Mod: CPTII,S$GLB,, | Performed by: OTOLARYNGOLOGY

## 2024-11-27 PROCEDURE — 4010F ACE/ARB THERAPY RXD/TAKEN: CPT | Mod: CPTII,S$GLB,, | Performed by: OTOLARYNGOLOGY

## 2024-11-27 NOTE — PROGRESS NOTES
The patient presents to review CT scans. These show evidence of chronic recurrent sinus infections involving his bilateral maxillaries, sphenoids, right frontal sinuses.  It also demonstrates a large left-sided posterior septal spur abutting his lateral sidewall as well as hypertrophic turbinates.  We have discussed Medtronic Fess to address these sinuses as well as endoscopic septoplasty and submucous resection of turbinates.  I have discussed the pros and cons risks and benefits as well as technical aspects of this procedure in detail with him and he understands and accepts these and wishes to proceed with scheduling.

## 2024-12-09 ENCOUNTER — PATIENT MESSAGE (OUTPATIENT)
Dept: ALLERGY | Facility: CLINIC | Age: 34
End: 2024-12-09
Payer: COMMERCIAL

## 2024-12-09 DIAGNOSIS — J32.9 RECURRENT SINUSITIS: Primary | ICD-10-CM

## 2024-12-09 DIAGNOSIS — R76.0 ABNORMAL ANTIBODY TITER: ICD-10-CM

## 2024-12-10 ENCOUNTER — TELEPHONE (OUTPATIENT)
Dept: ALLERGY | Facility: CLINIC | Age: 34
End: 2024-12-10
Payer: COMMERCIAL

## 2024-12-10 NOTE — TELEPHONE ENCOUNTER
Patient was scheduled for injection pneumovax vaccine 23 for Tuesday 01/07/25 @ 345 pm.  Patient verbalized understanding.

## 2024-12-10 NOTE — TELEPHONE ENCOUNTER
----- Message from Gavin Oshea MD sent at 12/9/2024  5:57 PM CST -----  Regarding: set up vaccine visit  Hello,  This patient has low pneumococcal titers. I sent them a portal message explaining results. Can someone please call or message him to schedule an injection visit for pneumovax vaccine (23 valent). he should get repeat pneumococcal titer labs 4-6 weeks after the vaccine. I have put in orders for both the vaccine and the labs.    Thanks,  Gavin Oshea MD  Allergy/Immunology

## 2025-01-06 NOTE — PROGRESS NOTES
Subjective:      Donal is a 34 y.o. male who comes for acute ear pain.  His last visit with me was on 11/11/2024.  Recent allergy testing was positive for dust mites.  Patient also with low antibody titers.  Patient is currently scheduled for sinus surgery in April with Dr. Deng.    Patient reports developing a cough with associated right ear fullness, posterior right ear pain, and bilateral throat pain x2 weeks ago.  Cough has overall resolved but fullness in his throat has persisted.  Pain to his throat is intermittent, not exacerbated with swallowing.  Patient reports ear pain feels similar to diagnosed ear infections in the past.  He reports sick contacts recently.    The patient's medications, allergies, past medical, surgical, social and family histories were reviewed and updated as appropriate.    A detailed review of systems was obtained with pertinent positives as per the above HPI, and otherwise negative.        Objective:     Wt 86.9 kg (191 lb 9.3 oz)   BMI 30.01 kg/m²      Physical Exam  Vitals reviewed.   Constitutional:       Appearance: Normal appearance.   HENT:      Head: Normocephalic and atraumatic.      Right Ear: Tympanic membrane, ear canal and external ear normal.      Left Ear: Ear canal and external ear normal. Tympanic membrane is scarred.      Nose: Septal deviation present.      Right Turbinates: Enlarged.      Left Turbinates: Enlarged.      Right Sinus: No maxillary sinus tenderness or frontal sinus tenderness.      Left Sinus: No maxillary sinus tenderness or frontal sinus tenderness.      Mouth/Throat:      Lips: Pink.      Mouth: Mucous membranes are moist.      Pharynx: Oropharynx is clear. Uvula midline.      Tonsils: No tonsillar exudate or tonsillar abscesses.   Eyes:      Conjunctiva/sclera: Conjunctivae normal.   Neck:      Thyroid: No thyroid mass, thyromegaly or thyroid tenderness.      Comments: TTP posterior and inferior to angle of R jaw  Pulmonary:      Effort:  "Pulmonary effort is normal.   Lymphadenopathy:      Cervical: No cervical adenopathy.   Neurological:      Mental Status: He is alert.          Procedure    None    Data Reviewed    WBC (K/uL)   Date Value   07/13/2024 14.39 (H)     Eosinophil % (%)   Date Value   07/13/2024 2.9     Eos # (K/uL)   Date Value   07/13/2024 0.4     Platelets (K/uL)   Date Value   07/13/2024 242     Glucose (mg/dL)   Date Value   07/13/2024 108     No results found for: "IGE"    I independently reviewed the images of the CT sinuses dated 11/26/24. Pertinent findings include BL maxillary mucosal thickening, L septal deviation, R sphenoid and R frontal sinus with mucosal thickening,     Assessment:     1. Upper respiratory tract infection, unspecified type    2. Allergic rhinitis due to dust mite         Plan:     Suspect sxs 2/2 LAD  Treat with oral amox  Otoscopic exam benign  Possible referred TMJ pain  Rx azelastine for PND    He will follow up as needed  I had a discussion with the patient regarding his condition and the further workup and management options.    All questions were answered, and the patient is in agreement with the above.     Disclaimer:  This note may have been prepared utilizing voice recognition software which may result in occasional typographical errors in the text such as sound alike words.   If further clarification is needed, please contact the ENT department of [facility].    "

## 2025-01-07 ENCOUNTER — OFFICE VISIT (OUTPATIENT)
Dept: OTOLARYNGOLOGY | Facility: CLINIC | Age: 35
End: 2025-01-07
Payer: COMMERCIAL

## 2025-01-07 VITALS — BODY MASS INDEX: 30.01 KG/M2 | WEIGHT: 191.56 LBS

## 2025-01-07 DIAGNOSIS — J30.89 ALLERGIC RHINITIS DUE TO DUST MITE: ICD-10-CM

## 2025-01-07 DIAGNOSIS — J06.9 UPPER RESPIRATORY TRACT INFECTION, UNSPECIFIED TYPE: Primary | ICD-10-CM

## 2025-01-07 PROCEDURE — 1159F MED LIST DOCD IN RCRD: CPT | Mod: CPTII,S$GLB,, | Performed by: PHYSICIAN ASSISTANT

## 2025-01-07 PROCEDURE — 99999 PR PBB SHADOW E&M-EST. PATIENT-LVL II: CPT | Mod: PBBFAC,,, | Performed by: PHYSICIAN ASSISTANT

## 2025-01-07 PROCEDURE — 99214 OFFICE O/P EST MOD 30 MIN: CPT | Mod: S$GLB,,, | Performed by: PHYSICIAN ASSISTANT

## 2025-01-07 PROCEDURE — 3008F BODY MASS INDEX DOCD: CPT | Mod: CPTII,S$GLB,, | Performed by: PHYSICIAN ASSISTANT

## 2025-01-07 RX ORDER — AZELASTINE 1 MG/ML
SPRAY, METERED NASAL
Qty: 30 ML | Refills: 11 | Status: SHIPPED | OUTPATIENT
Start: 2025-01-07

## 2025-01-07 RX ORDER — AMOXICILLIN 500 MG/1
500 TABLET, FILM COATED ORAL EVERY 12 HOURS
Qty: 14 TABLET | Refills: 0 | Status: SHIPPED | OUTPATIENT
Start: 2025-01-07 | End: 2025-01-14

## 2025-01-17 ENCOUNTER — OFFICE VISIT (OUTPATIENT)
Dept: OTOLARYNGOLOGY | Facility: CLINIC | Age: 35
End: 2025-01-17
Payer: COMMERCIAL

## 2025-01-17 VITALS — WEIGHT: 188.69 LBS | BODY MASS INDEX: 29.56 KG/M2

## 2025-01-17 DIAGNOSIS — H69.91 ETD (EUSTACHIAN TUBE DYSFUNCTION), RIGHT: Primary | ICD-10-CM

## 2025-01-17 PROCEDURE — 1159F MED LIST DOCD IN RCRD: CPT | Mod: CPTII,S$GLB,, | Performed by: PHYSICIAN ASSISTANT

## 2025-01-17 PROCEDURE — 99999 PR PBB SHADOW E&M-EST. PATIENT-LVL III: CPT | Mod: PBBFAC,,, | Performed by: PHYSICIAN ASSISTANT

## 2025-01-17 PROCEDURE — 3008F BODY MASS INDEX DOCD: CPT | Mod: CPTII,S$GLB,, | Performed by: PHYSICIAN ASSISTANT

## 2025-01-17 PROCEDURE — 99213 OFFICE O/P EST LOW 20 MIN: CPT | Mod: S$GLB,,, | Performed by: PHYSICIAN ASSISTANT

## 2025-01-17 RX ORDER — PREDNISONE 20 MG/1
TABLET ORAL
Qty: 11 TABLET | Refills: 0 | Status: SHIPPED | OUTPATIENT
Start: 2025-01-17 | End: 2025-01-24

## 2025-01-17 NOTE — PATIENT INSTRUCTIONS
Eustachian Tube Dysfunction  The eustachian tube is behind the eardrum. It connects the middle ear to the back of the throat. The tube is usually closed. But it opens periodically to help make the air pressure in the middle ear the same as outside the ear. The tube also drains mucus made in the middle ear.  A blocked tube is called a eustachian tube obstruction.  Eustachian tube dysfunction (ETD) is the failure of the Eustachian tube (a passage that leads to your middle ear) to open and/or close properly.    A eustachian tube that is blocked causes pressure, pain, and loss of hearing. Sounds may be muffled. The ear may feel full.  An adult may complain of ear pain, ear fullness/ pressure in the ear, dizziness, or even trouble hearing.  The blockage often goes away on its own without treatment. In other cases, treatments may be given to help reduce swelling within the tube. These may include a nasal decongestant, antihistamine, nasal spray (prescription steroid or over-the-counter saline), or allergy treatment.  A blocked eustachian tube is usually a short-term problem.   Home care  Nasal Steroid Spray such as Flonase.  2 sprays in each nostril once daily.   Use an antihistamine of your choice such as Zyrtec, Allegra, or Claritin during the day for allergy relief or you may use Xyzal at night (it may make you sleepy)  Try swallow, yawn or 'pop' your ears to open the tubes and equalize pressure 1-2 times daily.  Do NOT do this if there is any pain or discomfort.     FLONASE INSTRUCTIONS    You have been prescribed or instructed to take a nasal steroid spray.  Examples of this medication include Flonase (fluticasone), Nasacort (triamcinolone), and Rhinocort (budesonide). Some symptoms will experience relief within a few days; however, it may take 2-3 weeks to begin to see improvement. This medication needs to be taken consistently to see results- be patient :)    Helpful hints for maximizing medication into the nose  -  Use the opposite hand to spray the nostril (example: right hand for left nostril). This will help avoid spraying the medication onto the septum (the area that divides the left and right nasal cavity.)  - Tilt the bottle so that it is facing at a slight angle up or straight back, but avoid pointing the bottle straight up while spraying.   - Gently sniff (do not snort) a few seconds after spraying.

## 2025-01-17 NOTE — PROGRESS NOTES
Subjective:      Donal is a 34 y.o. male who comes for follow-up of  ear fullness/hearing loss .  His last visit with me was on 1/7/2025.      Patient recently took a flight and which neither of his ears popped.  Following his white his left ear has pressure eyes but his right ear has ear fullness any muffled hearing.  Patient has been unable to auto insufflate.  He has tried multiple medications including oral decongestants, antihistamines, and Mucinex.      Per last office visit 1/7/2025 :  Recent allergy testing was positive for dust mites.  Patient also with low antibody titers.  Patient is currently scheduled for sinus surgery in April with Dr. Deng.     Patient reports developing a cough with associated right ear fullness, posterior right ear pain, and bilateral throat pain x2 weeks ago.  Cough has overall resolved but fullness in his throat has persisted.  Pain to his throat is intermittent, not exacerbated with swallowing.  Patient reports ear pain feels similar to diagnosed ear infections in the past.  He reports sick contacts recently.     PLAN:  Suspect sxs 2/2 LAD  Treat with oral amox  Otoscopic exam benign  Possible referred TMJ pain  Rx azelastine for PND    The patient's medications, allergies, past medical, surgical, social and family histories were reviewed and updated as appropriate.    A detailed review of systems was obtained with pertinent positives as per the above HPI, and otherwise negative.        Objective:     Wt 85.6 kg (188 lb 11.4 oz)   BMI 29.56 kg/m²      Physical Exam  Vitals reviewed.   Constitutional:       Appearance: Normal appearance.   HENT:      Head: Normocephalic and atraumatic.      Right Ear: Ear canal and external ear normal. Tympanic membrane is retracted.      Left Ear: Tympanic membrane, ear canal and external ear normal.      Ears:      Myrick exam findings: Does not lateralize.     Right Rinne: BC > AC.     Left Rinne: AC > BC.     Comments: R posterior upper  "quadrant with prominent erythema without fluid level or mass  Eyes:      Conjunctiva/sclera: Conjunctivae normal.   Pulmonary:      Effort: Pulmonary effort is normal.   Neurological:      Mental Status: He is alert.          Procedure    None    Data Reviewed    WBC (K/uL)   Date Value   07/13/2024 14.39 (H)     Eosinophil % (%)   Date Value   07/13/2024 2.9     Eos # (K/uL)   Date Value   07/13/2024 0.4     Platelets (K/uL)   Date Value   07/13/2024 242     Glucose (mg/dL)   Date Value   07/13/2024 108     No results found for: "IGE"    Assessment:     1. ETD (Eustachian tube dysfunction), right         Plan:     Despite multiple over-the-counter remedies, patient with persistent right muffled hearing and ear fullness.  History and testing consistent with ETD.   Rx prednisone taper  If not improved in 1-2 weeks, we will have patient follow up with otologist with formal hearing test  - Discussed the anatomy and function of the eustachian tube including aerating and draining the middle ear.    - Common symptoms include: muffled/reduced hearing, plugged feeling, ear clicking/ popping, or ear pain.    - continue flonase and azelastine    He will follow up as needed  I had a discussion with the patient regarding his condition and the further workup and management options.    All questions were answered, and the patient is in agreement with the above.     Disclaimer:  This note may have been prepared utilizing voice recognition software which may result in occasional typographical errors in the text such as sound alike words.   If further clarification is needed, please contact the ENT department of [facility].    "

## 2025-02-18 ENCOUNTER — PATIENT MESSAGE (OUTPATIENT)
Dept: OTOLARYNGOLOGY | Facility: CLINIC | Age: 35
End: 2025-02-18
Payer: COMMERCIAL

## 2025-03-17 ENCOUNTER — OFFICE VISIT (OUTPATIENT)
Dept: SURGERY | Facility: CLINIC | Age: 35
End: 2025-03-17
Payer: COMMERCIAL

## 2025-03-17 VITALS — HEIGHT: 67 IN | WEIGHT: 190.5 LBS | BODY MASS INDEX: 29.9 KG/M2

## 2025-03-17 DIAGNOSIS — K62.89 ANAL PAIN: Primary | ICD-10-CM

## 2025-03-17 PROCEDURE — 99999 PR PBB SHADOW E&M-EST. PATIENT-LVL III: CPT | Mod: PBBFAC,,, | Performed by: NURSE PRACTITIONER

## 2025-03-17 PROCEDURE — 99213 OFFICE O/P EST LOW 20 MIN: CPT | Mod: S$GLB,,, | Performed by: NURSE PRACTITIONER

## 2025-03-17 PROCEDURE — 1160F RVW MEDS BY RX/DR IN RCRD: CPT | Mod: CPTII,S$GLB,, | Performed by: NURSE PRACTITIONER

## 2025-03-17 PROCEDURE — 1159F MED LIST DOCD IN RCRD: CPT | Mod: CPTII,S$GLB,, | Performed by: NURSE PRACTITIONER

## 2025-03-17 PROCEDURE — 4010F ACE/ARB THERAPY RXD/TAKEN: CPT | Mod: CPTII,S$GLB,, | Performed by: NURSE PRACTITIONER

## 2025-03-17 PROCEDURE — 3008F BODY MASS INDEX DOCD: CPT | Mod: CPTII,S$GLB,, | Performed by: NURSE PRACTITIONER

## 2025-03-17 RX ORDER — HYDROCORTISONE 25 MG/G
CREAM TOPICAL 2 TIMES DAILY
Qty: 28 G | Refills: 2 | Status: SHIPPED | OUTPATIENT
Start: 2025-03-17

## 2025-03-17 RX ORDER — AMOXICILLIN AND CLAVULANATE POTASSIUM 875; 125 MG/1; MG/1
1 TABLET, FILM COATED ORAL EVERY 12 HOURS
Qty: 10 TABLET | Refills: 0 | Status: SHIPPED | OUTPATIENT
Start: 2025-03-17 | End: 2025-03-22

## 2025-03-17 NOTE — PROGRESS NOTES
CRS Office Visit History and Physical    Referring Md:   No referring provider defined for this encounter.    SUBJECTIVE:     Chief Complaint: anal discomfort    History of Present Illness:  The patient is known patient to this practice last seen by myself 2 yrs ago for same, tx for fissure at that time.   Course is as follows:  Patient is a 35 y.o. male with hx of anal fissure and thrombosed hemorrhoid presents with anal discomfort/fullness.  Symptoms have been present for a few days however is leaving for a cycling competition in 4 days in [place].  Has tried prep h without improvement; pain with attempted insertion of prep h suppository.  Associated bleeding: no  Previous anorectal procedures: Yes, thrombosed hemorrhoid excision in   denies straining/prolonged time on toilet with bowel movements.  is not currently taking fiber supplement or stool softener  Blood thinners: No  GLP: No      Review of patient's allergies indicates:  No Known Allergies    Past Medical History:   Diagnosis Date    Keloid cicatrix     Kidney stones      Past Surgical History:   Procedure Laterality Date    APPENDECTOMY      CHOLECYSTECTOMY  2012    LAPAROSCOPIC APPENDECTOMY N/A 2018    Procedure: APPENDECTOMY, LAPAROSCOPIC;  Surgeon: Saulo Powell MD;  Location: [facility] OR [address] [place] [address];  Service: General;  Laterality: N/A;     Family History   Problem Relation Name Age of Onset    Hypertension Mother      Cancer Father          Liver    Cirrhosis Father      No Known Problems Sister Lelia     No Known Problems Sister Alma     Hypertension Maternal Grandmother      Skin cancer Maternal Grandfather      Melanoma Maternal Grandfather      No Known Problems Paternal Grandmother      Skin cancer Paternal Grandfather      Liver disease Paternal Grandfather      Heart disease Maternal Uncle      Cleft lip Daughter Shirley         s/p surgery,  4/15/2021    Stroke Neg Hx       Social History[1]     Review of Systems:  Review of  "Systems   Gastrointestinal:         Anal discomfort       OBJECTIVE:     Vital Signs (Most Recent)  Ht 5' 7" (1.702 m)   Wt 86.4 kg (190 lb 7.6 oz)   BMI 29.83 kg/m²   Pt declined bp and hr given digital medicine readings    Physical Exam:  General: White male in no distress   Neuro: Alert and oriented to person, place, and time.  Moves all extremities.     HEENT: No icterus.  Trachea midline  Respiratory: Respirations are even and unlabored, no cough or audible wheezing  Skin: Warm dry and intact, No visible rashes, no jaundice    Labs reviewed today:  Lab Results   Component Value Date    WBC 14.39 (H) 07/13/2024    HGB 17.1 07/13/2024    HCT 47.6 07/13/2024     07/13/2024    CHOL 173 03/24/2023    TRIG 58 03/24/2023    HDL 45 03/24/2023    ALT 73 (H) 07/13/2024    AST 36 07/13/2024     07/13/2024    K 3.6 07/13/2024     07/13/2024    CREATININE 1.1 07/13/2024    BUN 13 07/13/2024    CO2 19 (L) 07/13/2024    TSH 1.400 04/05/2022    INR 0.9 09/18/2018    HGBA1C 5.2 03/24/2023       Imaging reviewed today:  none    Endoscopy reviewed today:  none    Anorectal Exam:    Anal Skin:  RL skin tags noted; slight erythema and ttp to LL    Digital Rectal Exam:  Resting Tone high  Mass none  Tenderness  present    Anoscopy:  deferred    ASSESSMENT/PLAN:     Diagnoses and all orders for this visit:    Anal pain  -     hydrocortisone (ANUSOL-HC) 2.5 % rectal cream; Place rectally 2 (two) times daily.  -     amoxicillin-clavulanate 875-125mg (AUGMENTIN) 875-125 mg per tablet; Take 1 tablet by mouth every 12 (twelve) hours. for 5 days  -     diltiazem HCl (DILTIAZEM 2% CREAM); Apply topically 3 (three) times daily. Apply topically to anal area.    Will tx with abx given ttp and hx of fissure in this location    Refilled d[facility]em [facility] per pt request as uses intermittently    The patient was instructed to:  Augmentin 2x/day for 5 days  Anusol cream internally/externally 2x/day for up to 2 " weeks.  Message/call if no improvement    Mandie Agarwal, STACIP-C  Colon and Rectal Surgery           [1]   Social History  Tobacco Use    Smoking status: Never    Smokeless tobacco: Never   Substance Use Topics    Alcohol use: No    Drug use: No

## 2025-03-17 NOTE — PATIENT INSTRUCTIONS
Augmentin 2x/day for 5 days  Anusol cream internally/externally 2x/day for up to 2 weeks.  Message/call if no improvement

## 2025-03-22 ENCOUNTER — PATIENT MESSAGE (OUTPATIENT)
Dept: SURGERY | Facility: CLINIC | Age: 35
End: 2025-03-22
Payer: COMMERCIAL

## 2025-04-25 ENCOUNTER — PATIENT MESSAGE (OUTPATIENT)
Dept: OTOLARYNGOLOGY | Facility: CLINIC | Age: 35
End: 2025-04-25
Payer: COMMERCIAL

## 2025-06-27 ENCOUNTER — TELEPHONE (OUTPATIENT)
Dept: OTOLARYNGOLOGY | Facility: CLINIC | Age: 35
End: 2025-06-27
Payer: COMMERCIAL

## 2025-06-27 ENCOUNTER — TELEPHONE (OUTPATIENT)
Dept: INTERNAL MEDICINE | Facility: CLINIC | Age: 35
End: 2025-06-27
Payer: COMMERCIAL

## 2025-06-27 NOTE — TELEPHONE ENCOUNTER
Made patient a pre-op appointment with Dr Deng on 07/08/25. Also, advised him he needs labs at least 1 week prior to surgery, he voiced understanding.

## 2025-06-27 NOTE — TELEPHONE ENCOUNTER
Copied from CRM #[id number]. Topic: Appointments - Appointment Access  >> Jun 27, 2025  9:12 AM Korina wrote:  Type:  Needs Medical Advice    Who Called: Donal  Would the patient rather a call back or a response via [facility]? call  Best Call Back Number: [phone]  Additional Information: calling to confirm preop is not needed and obtain report time and instructions

## 2025-07-08 ENCOUNTER — OFFICE VISIT (OUTPATIENT)
Dept: OTOLARYNGOLOGY | Facility: CLINIC | Age: 35
End: 2025-07-08
Payer: COMMERCIAL

## 2025-07-08 VITALS — BODY MASS INDEX: 30.21 KG/M2 | WEIGHT: 192.88 LBS

## 2025-07-08 DIAGNOSIS — J32.9 CHRONIC RECURRENT SINUSITIS: Primary | ICD-10-CM

## 2025-07-08 PROCEDURE — 99999 PR PBB SHADOW E&M-EST. PATIENT-LVL III: CPT | Mod: PBBFAC,,, | Performed by: OTOLARYNGOLOGY

## 2025-07-08 PROCEDURE — 99499 UNLISTED E&M SERVICE: CPT | Mod: S$GLB,,, | Performed by: OTOLARYNGOLOGY

## 2025-07-08 NOTE — PROGRESS NOTES
is a 35-year-old white male who is scheduled for [employer] in 2 days.  He presents back today to discuss further options.  He states that he has been feeling well for the last 3 months utilizing occasional saline sinus rinses.  He has not been on any nasal steroids.  After complete discussion of the options he has elected to postpone his surgical procedure to see how he does over the ensuing year.  We will cancel this for him and he will return to clinic p.r.n.

## 2025-07-17 ENCOUNTER — PATIENT MESSAGE (OUTPATIENT)
Dept: ADMINISTRATIVE | Facility: HOSPITAL | Age: 35
End: 2025-07-17
Payer: COMMERCIAL

## 2025-07-17 ENCOUNTER — PATIENT OUTREACH (OUTPATIENT)
Dept: ADMINISTRATIVE | Facility: HOSPITAL | Age: 35
End: 2025-07-17
Payer: COMMERCIAL

## 2025-07-17 VITALS — SYSTOLIC BLOOD PRESSURE: 135 MMHG | DIASTOLIC BLOOD PRESSURE: 96 MMHG

## 2025-07-27 DIAGNOSIS — I10 ESSENTIAL HYPERTENSION: ICD-10-CM

## 2025-07-28 RX ORDER — IRBESARTAN 300 MG/1
TABLET ORAL
Qty: 90 TABLET | Refills: 3 | Status: SHIPPED | OUTPATIENT
Start: 2025-07-28

## 2025-07-29 RX ORDER — BUSPIRONE HYDROCHLORIDE 7.5 MG/1
7.5 TABLET ORAL
Qty: 30 TABLET | Refills: 2 | Status: SHIPPED | OUTPATIENT
Start: 2025-07-29 | End: 2026-07-29

## 2025-07-30 ENCOUNTER — OFFICE VISIT (OUTPATIENT)
Dept: PRIMARY CARE CLINIC | Facility: CLINIC | Age: 35
End: 2025-07-30
Payer: COMMERCIAL

## 2025-07-30 VITALS
HEIGHT: 67 IN | HEART RATE: 77 BPM | RESPIRATION RATE: 17 BRPM | DIASTOLIC BLOOD PRESSURE: 82 MMHG | OXYGEN SATURATION: 99 % | SYSTOLIC BLOOD PRESSURE: 134 MMHG | BODY MASS INDEX: 30.63 KG/M2 | WEIGHT: 195.13 LBS

## 2025-07-30 DIAGNOSIS — R07.9 CHEST PAIN, UNSPECIFIED TYPE: Primary | ICD-10-CM

## 2025-07-30 DIAGNOSIS — I10 ESSENTIAL HYPERTENSION: ICD-10-CM

## 2025-07-30 LAB
OHS QRS DURATION: 82 MS
OHS QTC CALCULATION: 429 MS

## 2025-07-30 PROCEDURE — 93010 ELECTROCARDIOGRAM REPORT: CPT | Mod: S$GLB,,, | Performed by: INTERNAL MEDICINE

## 2025-07-30 PROCEDURE — 99214 OFFICE O/P EST MOD 30 MIN: CPT | Mod: S$GLB,,, | Performed by: NURSE PRACTITIONER

## 2025-07-30 PROCEDURE — 3008F BODY MASS INDEX DOCD: CPT | Mod: CPTII,S$GLB,, | Performed by: NURSE PRACTITIONER

## 2025-07-30 PROCEDURE — 4010F ACE/ARB THERAPY RXD/TAKEN: CPT | Mod: CPTII,S$GLB,, | Performed by: NURSE PRACTITIONER

## 2025-07-30 PROCEDURE — 99999 PR PBB SHADOW E&M-EST. PATIENT-LVL III: CPT | Mod: PBBFAC,,, | Performed by: NURSE PRACTITIONER

## 2025-07-30 PROCEDURE — 93005 ELECTROCARDIOGRAM TRACING: CPT | Mod: S$GLB,,, | Performed by: NURSE PRACTITIONER

## 2025-07-30 PROCEDURE — 3075F SYST BP GE 130 - 139MM HG: CPT | Mod: CPTII,S$GLB,, | Performed by: NURSE PRACTITIONER

## 2025-07-30 PROCEDURE — 1160F RVW MEDS BY RX/DR IN RCRD: CPT | Mod: CPTII,S$GLB,, | Performed by: NURSE PRACTITIONER

## 2025-07-30 PROCEDURE — 1159F MED LIST DOCD IN RCRD: CPT | Mod: CPTII,S$GLB,, | Performed by: NURSE PRACTITIONER

## 2025-07-30 PROCEDURE — 3079F DIAST BP 80-89 MM HG: CPT | Mod: CPTII,S$GLB,, | Performed by: NURSE PRACTITIONER

## 2025-07-30 NOTE — PROGRESS NOTES
[facility] Note    Chief Complaint      Chief Complaint   Patient presents with    Chest Pain     History of Present Illness      Donal Serrano is a 35 y.o. male patient who presents today for left chest discomfort.    EKG-NSR    History of Present Illness    CHIEF COMPLAINT:  Donal presents today for evaluation of chest pain.    HISTORY OF PRESENT ILLNESS:  He reports intermittent, minor dull chest pain on the left side for 2-3 weeks. The pain is described as very mild and occurring from time to time without radiation to arm or neck. He denies shortness of breath and back pain associated with the chest discomfort. He notes the pain may be related to recent cycling activities.    EXERCISE AND BLOOD PRESSURE MANAGEMENT:  He initiated cycling 3 months ago, currently cycling 1.5-2 hours per session. His resting heart rate is now in the 60s. He maintains good hydration with two 22-oz water bottles during rides and approximately six 20-oz bottles throughout the day. He tracks cycling performance and physiological metrics using wearable devices. Current blood pressure is 134/82.    PAST MEDICAL HISTORY:  History includes occasional migraines, infrequent mild palpitations, and anxiety.    FAMILY HISTORY:  Family history significant for hypertension in multiple family members. He denies family history of cardiac events such as heart attacks or blockages.    MEDICATIONS:  Current medications include Irbesartan, Buspar started daily 3-4 days ago (previously used as needed for stress), magnesium, and Vitamin D supplements. He denies use of Sudafed, cough syrup, or allergy medications.      ROS:  Cardiovascular: +chest pain, -palpitations  Respiratory: -shortness of breath  Neurological: +migraines  Psychiatric: +anxiety         Health Maintenance   Topic Date Due    COVID-19 Vaccine (5 - 2024-25 season) 09/01/2024    Influenza Vaccine (1) 09/01/2025    TETANUS VACCINE  03/21/2026    RSV Vaccine (Age  "60+ and Pregnant patients) (1 - 1-dose 75+ series) 02/05/2066    Hepatitis C Screening  Completed    HIV Screening  Completed    Lipid Panel  Completed    Pneumococcal Vaccines (Age 0-49)  Aged Out       Past Medical History:   Diagnosis Date    Hypertension     Keloid cicatrix     Kidney stones     Migraine headache 08/2020       Past Surgical History:   Procedure Laterality Date    ADENOIDECTOMY  1996    APPENDECTOMY      CHOLECYSTECTOMY  03/2012    LAPAROSCOPIC APPENDECTOMY N/A 08/20/2018    Procedure: APPENDECTOMY, LAPAROSCOPIC;  Surgeon: Saulo Powell MD;  Location: [facility] OR [address] [place] [address];  Service: General;  Laterality: N/A;    TONSILLECTOMY  1996    TYMPANOSTOMY TUBE PLACEMENT      1995       family history includes Cancer in his father; Cirrhosis in his father; Cleft lip in his daughter; Heart disease in his maternal uncle; Hypertension in his maternal grandmother and mother; Liver disease in his father and paternal grandfather; Melanoma in his maternal grandfather; No Known Problems in his paternal grandmother, sister, and sister; Skin cancer in his maternal grandfather and paternal grandfather.     Social History[1]    Encounter Medications[2]    Review of patient's allergies indicates:  No Known Allergies    Physical Exam      Vital Signs  Pulse: 77  Resp: 17  SpO2: 99 %  BP: 134/82  BP Location: Right arm  Patient Position: Sitting  Pain Score: 0-No pain  Height and Weight  Height: 5' 7" (170.2 cm)  Weight: 88.5 kg (195 lb 1.7 oz)  BSA (Calculated - sq m): 2.05 sq meters  BMI (Calculated): 30.6  Weight in (lb) to have BMI = 25: 159.3    Physical Exam  Vitals and nursing note reviewed.   Constitutional:       General: He is not in acute distress.     Appearance: Normal appearance. He is well-developed. He is not ill-appearing.   HENT:      Head: Normocephalic and atraumatic.      Right Ear: External ear normal.      Left Ear: External ear normal.   Eyes:      Conjunctiva/sclera: Conjunctivae normal. "   Neck:      Thyroid: No thyromegaly.      Vascular: No carotid bruit or JVD.      Trachea: No tracheal deviation.   Cardiovascular:      Rate and Rhythm: Normal rate and regular rhythm.      Heart sounds: Normal heart sounds.   Pulmonary:      Effort: Pulmonary effort is normal. No respiratory distress.      Breath sounds: Normal breath sounds.   Abdominal:      General: There is no distension.      Palpations: Abdomen is soft.      Tenderness: There is no abdominal tenderness.   Musculoskeletal:         General: Normal range of motion.      Cervical back: Normal range of motion.   Lymphadenopathy:      Cervical: No cervical adenopathy.   Skin:     General: Skin is warm and dry.      Coloration: Skin is not pale.      Findings: No erythema or rash.   Neurological:      General: No focal deficit present.      Mental Status: He is alert and oriented to person, place, and time.   Psychiatric:         Mood and Affect: Mood normal.         Behavior: Behavior normal.         Thought Content: Thought content normal.         Judgment: Judgment normal.          Laboratory:  CBC:  Lab Results   Component Value Date    WBC 14.39 (H) 07/13/2024    RBC 5.73 07/13/2024    HGB 17.1 07/13/2024    HCT 47.6 07/13/2024     07/13/2024    MCV 83 07/13/2024    MCH 29.8 07/13/2024    MCHC 35.9 07/13/2024    MCHC 34.7 07/03/2024    MCHC 33.6 09/08/2023     CMP:  Lab Results   Component Value Date     07/13/2024    CALCIUM 10.2 07/13/2024    ALBUMIN 4.8 07/13/2024    PROT 8.8 (H) 07/13/2024     07/13/2024    K 3.6 07/13/2024    CO2 19 (L) 07/13/2024     07/13/2024    BUN 13 07/13/2024    ALKPHOS 103 07/13/2024    ALT 73 (H) 07/13/2024    AST 36 07/13/2024    BILITOT 1.1 (H) 07/13/2024    BILITOT 0.4 07/03/2024    BILITOT 0.5 03/24/2023     URINALYSIS:  Lab Results   Component Value Date    COLORU Yellow 09/08/2023    SPECGRAV 1.015 09/08/2023    PHUR 5.0 09/08/2023    PROTEINUA Negative 09/08/2023    BACTERIA Rare  07/18/2011    NITRITE Negative 09/08/2023    LEUKOCYTESUR Negative 09/08/2023    UROBILINOGEN Negative 08/20/2018      LIPIDS:  Lab Results   Component Value Date    TSH 1.400 04/05/2022    TSH 0.841 08/06/2020    TSH 0.661 01/21/2019    HDL 45 03/24/2023    HDL 43 04/05/2022    HDL 48 02/26/2018    CHOL 173 03/24/2023    CHOL 160 04/05/2022    CHOL 162 02/26/2018    TRIG 58 03/24/2023    TRIG 64 04/05/2022    TRIG 81 02/26/2018    LDLCALC 116.4 03/24/2023    LDLCALC 104.2 04/05/2022    LDLCALC 97.8 02/26/2018    CHOLHDL 26.0 03/24/2023    CHOLHDL 26.9 04/05/2022    CHOLHDL 29.6 02/26/2018    NONHDLCHOL 128 03/24/2023    NONHDLCHOL 117 04/05/2022    NONHDLCHOL 114 02/26/2018    TOTALCHOLEST 3.8 03/24/2023    TOTALCHOLEST 3.7 04/05/2022    TOTALCHOLEST 3.4 02/26/2018     TSH:  Lab Results   Component Value Date    TSH 1.400 04/05/2022    TSH 0.841 08/06/2020    TSH 0.661 01/21/2019     A1C:  Lab Results   Component Value Date    HGBA1C 5.2 03/24/2023    HGBA1C 4.9 06/07/2011    HGBA1C 4.9 04/27/2009    HGBA1C 4.8 02/14/2005         Assessment/Plan     Donal Serrano is a 35 y.o.male with:    Assessment & Plan        CHEST PAIN:  - Evaluated chest pain etiology considering cardiac, respiratory, and muscular causes.  - Donal reports intermittent, dull ache on the left side for 2-3 weeks, likely related to recent cycling activity.  - Denies shortness of breath, radiating pain, or associated back pain.  - Ordered EKG to rule out cardiac causes.  - trial anti-inflammatory medication as needed to determine if pain is muscular in origin.  - Recommend stretching before and after cycling.    HYPERTENSION:  - Blood pressure measured at 134/82, indicating improvement.  - Current management with Irbesartan appears adequate.  - Donal prefers lifestyle modifications over additional medications.  - Advised home BP monitoring and continuing cycling for cardiovascular health.  - Recommend mindful sodium intake on normal days  with adequate electrolyte replacement during cycling activities.  - Emphasized maintaining proper hydration, especially during exercise.  - Noted family history of hypertension without cardiac events.    ANXIETY DISORDER:  - Donal reports high anxiety and recently started Buspar daily (3-4 days ago).  - Ruled out medication side effects or interactions.  - Acknowledged stress as contributing factor.  - Educated patient on stress management techniques, particularly  breathing exercise, which was recommended for regular practice.    MIGRAINE:  - Donal experiences migraines from time to time.    PALPITATIONS:  - Donal experiences occasional palpitations.  - Discussed importance of post-exercise heart rate recovery monitoring.    PERSONAL HISTORY OF CIRCULATORY SYSTEM DISEASES:  - Donal has history of elevated blood pressure and has undergone EKGs for baseline comparison.         Chest pain, unspecified type  -     IN OFFICE EKG 12-LEAD (to Muse)    Essential hypertension         Health Maintenance Due   Topic Date Due    COVID-19 Vaccine (5 - 2024-25 season) 09/01/2024          I spent 34 minutes on the day of this encounter for preparing for, evaluating, treating, and managing this patient.        -Continue current medications and maintain follow up with specialists.  Return to clinic in Follow up if symptoms worsen or fail to improve.     This note was generated with the assistance of ambient listening technology. Verbal consent was obtained by the patient and accompanying visitor(s) for the recording of patient appointment to facilitate this note. I attest to having reviewed and edited the generated note for accuracy, though some syntax or spelling errors may persist. Please contact the author of this note for any clarification.         [facility]                       [1]   Social History  Tobacco Use    Smoking status: Never    Smokeless tobacco: Never   Substance  Use Topics    Alcohol use: No    Drug use: No   [2]   Outpatient Encounter Medications as of 7/30/2025   Medication Sig Dispense Refill    blood pressure monitor ([employer] EASE) ST size by Other route as needed for High Blood Pressure. 1 each 0    busPIRone (BUSPAR) 7.5 MG tablet Take 1 tablet (7.5 mg total) by mouth every 6 to 8 hours as needed (anxiety). 30 tablet 2    irbesartan (AVAPRO) 300 MG tablet TAKE 1 TABLET(300 MG) BY MOUTH DAILY 90 tablet 3    [DISCONTINUED] azelastine (ASTELIN) 137 mcg (0.1 %) nasal spray Use 1-2 sprays in each nostril twice daily (Patient not taking: Reported on 7/30/2025) 30 mL 11    [DISCONTINUED] fluticasone propionate (FLONASE) 50 mcg/actuation nasal spray 2 sprays (100 mcg total) by Each Nostril route once daily. (Patient not taking: Reported on 7/30/2025) 16 g 11    [DISCONTINUED] hydrocortisone (ANUSOL-HC) 2.5 % rectal cream Place rectally 2 (two) times daily. (Patient not taking: Reported on 7/30/2025) 28 g 2     No facility-administered encounter medications on file as of 7/30/2025.

## 2025-08-27 ENCOUNTER — OFFICE VISIT (OUTPATIENT)
Dept: UROLOGY | Facility: CLINIC | Age: 35
End: 2025-08-27
Attending: UROLOGY
Payer: COMMERCIAL

## 2025-08-27 VITALS
OXYGEN SATURATION: 95 % | SYSTOLIC BLOOD PRESSURE: 140 MMHG | HEIGHT: 67 IN | DIASTOLIC BLOOD PRESSURE: 87 MMHG | WEIGHT: 192.69 LBS | HEART RATE: 96 BPM | BODY MASS INDEX: 30.24 KG/M2

## 2025-08-27 DIAGNOSIS — Z30.09 VASECTOMY EVALUATION: Primary | ICD-10-CM

## 2025-08-27 PROCEDURE — 4010F ACE/ARB THERAPY RXD/TAKEN: CPT | Mod: CPTII,S$GLB,, | Performed by: UROLOGY

## 2025-08-27 PROCEDURE — 1160F RVW MEDS BY RX/DR IN RCRD: CPT | Mod: CPTII,S$GLB,, | Performed by: UROLOGY

## 2025-08-27 PROCEDURE — 3077F SYST BP >= 140 MM HG: CPT | Mod: CPTII,S$GLB,, | Performed by: UROLOGY

## 2025-08-27 PROCEDURE — 3008F BODY MASS INDEX DOCD: CPT | Mod: CPTII,S$GLB,, | Performed by: UROLOGY

## 2025-08-27 PROCEDURE — 3079F DIAST BP 80-89 MM HG: CPT | Mod: CPTII,S$GLB,, | Performed by: UROLOGY

## 2025-08-27 PROCEDURE — 99203 OFFICE O/P NEW LOW 30 MIN: CPT | Mod: S$GLB,,, | Performed by: UROLOGY

## 2025-08-27 PROCEDURE — 1159F MED LIST DOCD IN RCRD: CPT | Mod: CPTII,S$GLB,, | Performed by: UROLOGY

## 2025-08-27 RX ORDER — DIAZEPAM 10 MG/1
10 TABLET ORAL
Qty: 1 TABLET | Refills: 0 | Status: SHIPPED | OUTPATIENT
Start: 2025-08-27 | End: 2025-09-26

## (undated) DEVICE — NDL HYPO REG 25G X 1 1/2

## (undated) DEVICE — BLADE SURG CARBON STEEL SZ11

## (undated) DEVICE — SUT 0 VICRYL / UR6 (J603)

## (undated) DEVICE — TROCAR ENDOPATH XCEL 5X100MM

## (undated) DEVICE — BANDAGE ADHESIVE

## (undated) DEVICE — CANNULA ENDOPATH XCEL 5X100MM

## (undated) DEVICE — SUT MCRYL PLUS 4-0 PS2 27IN

## (undated) DEVICE — Device

## (undated) DEVICE — TRAY FOLEY 16FR INFECTION CONT

## (undated) DEVICE — TRAY MINOR GEN SURG

## (undated) DEVICE — IRRIGATOR ENDOSCOPY DISP.

## (undated) DEVICE — DRESSING LEUKOPLAST FLEX 1X3IN

## (undated) DEVICE — CLOSURE SKIN STERI STRIP 1/2X4

## (undated) DEVICE — SOL NS 1000CC

## (undated) DEVICE — TUBING HF INSUFFLATION W/ FLTR

## (undated) DEVICE — ELECTRODE REM PLYHSV RETURN 9